# Patient Record
Sex: MALE | Race: WHITE | NOT HISPANIC OR LATINO | URBAN - METROPOLITAN AREA
[De-identification: names, ages, dates, MRNs, and addresses within clinical notes are randomized per-mention and may not be internally consistent; named-entity substitution may affect disease eponyms.]

---

## 2017-02-01 ENCOUNTER — INPATIENT (INPATIENT)
Facility: HOSPITAL | Age: 22
LOS: 2 days | Discharge: HOME | DRG: 669 | End: 2017-02-04
Attending: UROLOGY | Admitting: UROLOGY
Payer: COMMERCIAL

## 2017-02-01 VITALS
OXYGEN SATURATION: 99 % | RESPIRATION RATE: 18 BRPM | DIASTOLIC BLOOD PRESSURE: 75 MMHG | HEART RATE: 97 BPM | SYSTOLIC BLOOD PRESSURE: 135 MMHG | TEMPERATURE: 99 F

## 2017-02-01 LAB
ALBUMIN SERPL ELPH-MCNC: 4.7 G/DL — SIGNIFICANT CHANGE UP (ref 3.3–5)
ALP SERPL-CCNC: 65 U/L — SIGNIFICANT CHANGE UP (ref 40–120)
ALT FLD-CCNC: 27 U/L RC — SIGNIFICANT CHANGE UP (ref 10–45)
ANION GAP SERPL CALC-SCNC: 13 MMOL/L — SIGNIFICANT CHANGE UP (ref 5–17)
APTT BLD: 33 SEC — SIGNIFICANT CHANGE UP (ref 27.5–37.4)
AST SERPL-CCNC: 18 U/L — SIGNIFICANT CHANGE UP (ref 10–40)
BILIRUB SERPL-MCNC: 0.4 MG/DL — SIGNIFICANT CHANGE UP (ref 0.2–1.2)
BUN SERPL-MCNC: 16 MG/DL — SIGNIFICANT CHANGE UP (ref 7–23)
CALCIUM SERPL-MCNC: 10.1 MG/DL — SIGNIFICANT CHANGE UP (ref 8.4–10.5)
CHLORIDE SERPL-SCNC: 105 MMOL/L — SIGNIFICANT CHANGE UP (ref 96–108)
CO2 SERPL-SCNC: 24 MMOL/L — SIGNIFICANT CHANGE UP (ref 22–31)
CREAT SERPL-MCNC: 1.62 MG/DL — HIGH (ref 0.5–1.3)
GLUCOSE SERPL-MCNC: 95 MG/DL — SIGNIFICANT CHANGE UP (ref 70–99)
HCT VFR BLD CALC: 35 % — LOW (ref 39–50)
HGB BLD-MCNC: 11.6 G/DL — LOW (ref 13–17)
INR BLD: 1.15 RATIO — SIGNIFICANT CHANGE UP (ref 0.88–1.16)
MCHC RBC-ENTMCNC: 20.7 PG — LOW (ref 27–34)
MCHC RBC-ENTMCNC: 33.2 GM/DL — SIGNIFICANT CHANGE UP (ref 32–36)
MCV RBC AUTO: 62.3 FL — LOW (ref 80–100)
PLATELET # BLD AUTO: 276 K/UL — SIGNIFICANT CHANGE UP (ref 150–400)
POTASSIUM SERPL-MCNC: 4.1 MMOL/L — SIGNIFICANT CHANGE UP (ref 3.5–5.3)
POTASSIUM SERPL-SCNC: 4.1 MMOL/L — SIGNIFICANT CHANGE UP (ref 3.5–5.3)
PROT SERPL-MCNC: 7.5 G/DL — SIGNIFICANT CHANGE UP (ref 6–8.3)
PROTHROM AB SERPL-ACNC: 12.4 SEC — SIGNIFICANT CHANGE UP (ref 10–13.1)
RBC # BLD: 5.62 M/UL — SIGNIFICANT CHANGE UP (ref 4.2–5.8)
RBC # FLD: 14.7 % — HIGH (ref 10.3–14.5)
SODIUM SERPL-SCNC: 142 MMOL/L — SIGNIFICANT CHANGE UP (ref 135–145)
WBC # BLD: 12.3 K/UL — HIGH (ref 3.8–10.5)
WBC # FLD AUTO: 12.3 K/UL — HIGH (ref 3.8–10.5)

## 2017-02-01 PROCEDURE — 99285 EMERGENCY DEPT VISIT HI MDM: CPT

## 2017-02-01 RX ORDER — HYDROMORPHONE HYDROCHLORIDE 2 MG/ML
1 INJECTION INTRAMUSCULAR; INTRAVENOUS; SUBCUTANEOUS ONCE
Qty: 0 | Refills: 0 | Status: DISCONTINUED | OUTPATIENT
Start: 2017-02-01 | End: 2017-02-01

## 2017-02-01 RX ORDER — SODIUM CHLORIDE 9 MG/ML
1000 INJECTION INTRAMUSCULAR; INTRAVENOUS; SUBCUTANEOUS ONCE
Qty: 0 | Refills: 0 | Status: COMPLETED | OUTPATIENT
Start: 2017-02-01 | End: 2017-02-01

## 2017-02-01 RX ORDER — SODIUM CHLORIDE 9 MG/ML
1000 INJECTION INTRAMUSCULAR; INTRAVENOUS; SUBCUTANEOUS ONCE
Qty: 0 | Refills: 0 | Status: DISCONTINUED | OUTPATIENT
Start: 2017-02-01 | End: 2017-02-01

## 2017-02-01 RX ORDER — ONDANSETRON 8 MG/1
4 TABLET, FILM COATED ORAL ONCE
Qty: 0 | Refills: 0 | Status: COMPLETED | OUTPATIENT
Start: 2017-02-01 | End: 2017-02-01

## 2017-02-01 RX ADMIN — HYDROMORPHONE HYDROCHLORIDE 1 MILLIGRAM(S): 2 INJECTION INTRAMUSCULAR; INTRAVENOUS; SUBCUTANEOUS at 22:57

## 2017-02-01 RX ADMIN — ONDANSETRON 4 MILLIGRAM(S): 8 TABLET, FILM COATED ORAL at 23:36

## 2017-02-01 RX ADMIN — SODIUM CHLORIDE 1000 MILLILITER(S): 9 INJECTION INTRAMUSCULAR; INTRAVENOUS; SUBCUTANEOUS at 22:57

## 2017-02-01 RX ADMIN — HYDROMORPHONE HYDROCHLORIDE 1 MILLIGRAM(S): 2 INJECTION INTRAMUSCULAR; INTRAVENOUS; SUBCUTANEOUS at 23:45

## 2017-02-01 RX ADMIN — HYDROMORPHONE HYDROCHLORIDE 1 MILLIGRAM(S): 2 INJECTION INTRAMUSCULAR; INTRAVENOUS; SUBCUTANEOUS at 23:46

## 2017-02-01 NOTE — ED PROVIDER NOTE - MEDICAL DECISION MAKING DETAILS
MD Rashad,Attending: pt seen. agree with above HPI/ROS/pE. Recent stent L ureter for severe L hydroureteronephrosis ? due to congenital UPJ obstruction per pt. Moderate R sided findings on last CT. Onset of severe R sided flank pain this am with nausea/chills/radiation to R testicle. Has had intermittent gross hematuria since procedure above. Fo ranalgesia/UA/US to evaluate R sided hydro. and flat palte KUB to try and locate stone --as pt does not want CT scan due to having had 2 over past month. If pain uncontrolled will need Urology consult

## 2017-02-01 NOTE — ED PROVIDER NOTE - PROGRESS NOTE DETAILS
pain uncontrolled --will rx more dilaudid. plain film--no radiodense stone seen on R--stent on L well positioned. Creatinine eluated to 1.60 form 1.29 in Nov. Will request Urology consult

## 2017-02-01 NOTE — ED PROVIDER NOTE - OBJECTIVE STATEMENT
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 Today with severe R sided flank pain. No ureteral stents on right side. No fevers. +nausea and one episode of vomiting. No pain medication prior to arrival.

## 2017-02-01 NOTE — ED ADULT NURSE NOTE - OBJECTIVE STATEMENT
20 y/o male presenting to the 20 y/o male presenting to the ED with right flank pain; patient has hx of pyeloplasty january 9th with stent placement in left kidney; states has hx UPJ obstruction; Patient states right flank pain x 2days; Patient states nausea and vomiting x 1 episode but no diarrhea; patient states difficulty voiding with no burning; patient denies sob, chest pain, dizziness; Gross neuro in tact; a&ox3; safety and comfort measures provided 22 y/o male presenting to the ED with right flank pain; patient has hx of pyeloplasty january 9th with stent placement in left kidney; states has hx UPJ obstruction; Patient states right flank pain x 2days; Patient states nausea and vomiting x 1 episode but no diarrhea; patient states difficulty voiding with no burning; patient denies sob, chest pain, dizziness; Neuro grossly intact; a&ox3; safety and comfort measures provided

## 2017-02-01 NOTE — ED ADULT NURSE REASSESSMENT NOTE - NS ED NURSE REASSESS COMMENT FT1
Patient vomited in ER x 1 time; states pain has improved a slight bit but still has pain; states vomiting due to pain; MD notified

## 2017-02-02 DIAGNOSIS — N13.0 HYDRONEPHROSIS WITH URETEROPELVIC JUNCTION OBSTRUCTION: Chronic | ICD-10-CM

## 2017-02-02 DIAGNOSIS — N23 UNSPECIFIED RENAL COLIC: ICD-10-CM

## 2017-02-02 LAB
ANION GAP SERPL CALC-SCNC: 11 MMOL/L — SIGNIFICANT CHANGE UP (ref 5–17)
APPEARANCE UR: CLEAR — SIGNIFICANT CHANGE UP
BASOPHILS # BLD AUTO: 0 K/UL — SIGNIFICANT CHANGE UP (ref 0–0.2)
BILIRUB UR-MCNC: NEGATIVE — SIGNIFICANT CHANGE UP
BUN SERPL-MCNC: 13 MG/DL — SIGNIFICANT CHANGE UP (ref 7–23)
CALCIUM SERPL-MCNC: 9.4 MG/DL — SIGNIFICANT CHANGE UP (ref 8.4–10.5)
CHLORIDE SERPL-SCNC: 104 MMOL/L — SIGNIFICANT CHANGE UP (ref 96–108)
CO2 SERPL-SCNC: 25 MMOL/L — SIGNIFICANT CHANGE UP (ref 22–31)
COLOR SPEC: YELLOW — SIGNIFICANT CHANGE UP
COMMENT - URINE: SIGNIFICANT CHANGE UP
CREAT SERPL-MCNC: 1.54 MG/DL — HIGH (ref 0.5–1.3)
DIFF PNL FLD: ABNORMAL
EOSINOPHIL # BLD AUTO: 0.2 K/UL — SIGNIFICANT CHANGE UP (ref 0–0.5)
GLUCOSE SERPL-MCNC: 98 MG/DL — SIGNIFICANT CHANGE UP (ref 70–99)
GLUCOSE UR QL: NEGATIVE — SIGNIFICANT CHANGE UP
HCT VFR BLD CALC: 32.6 % — LOW (ref 39–50)
HGB BLD-MCNC: 10.5 G/DL — LOW (ref 13–17)
KETONES UR-MCNC: NEGATIVE — SIGNIFICANT CHANGE UP
LEUKOCYTE ESTERASE UR-ACNC: ABNORMAL
LYMPHOCYTES # BLD AUTO: 17 % — SIGNIFICANT CHANGE UP (ref 13–44)
LYMPHOCYTES # BLD AUTO: 2.1 K/UL — SIGNIFICANT CHANGE UP (ref 1–3.3)
MCHC RBC-ENTMCNC: 20.1 PG — LOW (ref 27–34)
MCHC RBC-ENTMCNC: 32.3 GM/DL — SIGNIFICANT CHANGE UP (ref 32–36)
MCV RBC AUTO: 62.2 FL — LOW (ref 80–100)
MONOCYTES # BLD AUTO: 1.1 K/UL — HIGH (ref 0–0.9)
MONOCYTES NFR BLD AUTO: 7 % — SIGNIFICANT CHANGE UP (ref 2–14)
NEUTROPHILS # BLD AUTO: 8.9 K/UL — HIGH (ref 1.8–7.4)
NEUTROPHILS NFR BLD AUTO: 75 % — SIGNIFICANT CHANGE UP (ref 43–77)
NITRITE UR-MCNC: NEGATIVE — SIGNIFICANT CHANGE UP
PH UR: 6 — SIGNIFICANT CHANGE UP (ref 4.8–8)
PLATELET # BLD AUTO: 259 K/UL — SIGNIFICANT CHANGE UP (ref 150–400)
POTASSIUM SERPL-MCNC: 4.2 MMOL/L — SIGNIFICANT CHANGE UP (ref 3.5–5.3)
POTASSIUM SERPL-SCNC: 4.2 MMOL/L — SIGNIFICANT CHANGE UP (ref 3.5–5.3)
PROT UR-MCNC: 30 MG/DL
RBC # BLD: 5.25 M/UL — SIGNIFICANT CHANGE UP (ref 4.2–5.8)
RBC # FLD: 14.3 % — SIGNIFICANT CHANGE UP (ref 10.3–14.5)
RBC CASTS # UR COMP ASSIST: >50 /HPF (ref 0–2)
SODIUM SERPL-SCNC: 140 MMOL/L — SIGNIFICANT CHANGE UP (ref 135–145)
SP GR SPEC: 1.02 — SIGNIFICANT CHANGE UP (ref 1.01–1.02)
UROBILINOGEN FLD QL: NEGATIVE — SIGNIFICANT CHANGE UP
WBC # BLD: 11.2 K/UL — HIGH (ref 3.8–10.5)
WBC # FLD AUTO: 11.2 K/UL — HIGH (ref 3.8–10.5)
WBC UR QL: SIGNIFICANT CHANGE UP /HPF (ref 0–5)

## 2017-02-02 PROCEDURE — 99222 1ST HOSP IP/OBS MODERATE 55: CPT

## 2017-02-02 PROCEDURE — 76770 US EXAM ABDO BACK WALL COMP: CPT | Mod: 26

## 2017-02-02 PROCEDURE — 76775 US EXAM ABDO BACK WALL LIM: CPT | Mod: 26

## 2017-02-02 PROCEDURE — 74181 MRI ABDOMEN W/O CONTRAST: CPT | Mod: 26

## 2017-02-02 RX ORDER — HYDROMORPHONE HYDROCHLORIDE 2 MG/ML
1 INJECTION INTRAMUSCULAR; INTRAVENOUS; SUBCUTANEOUS EVERY 4 HOURS
Qty: 0 | Refills: 0 | Status: DISCONTINUED | OUTPATIENT
Start: 2017-02-02 | End: 2017-02-02

## 2017-02-02 RX ORDER — SODIUM CHLORIDE 9 MG/ML
1000 INJECTION INTRAMUSCULAR; INTRAVENOUS; SUBCUTANEOUS
Qty: 0 | Refills: 0 | Status: DISCONTINUED | OUTPATIENT
Start: 2017-02-02 | End: 2017-02-02

## 2017-02-02 RX ORDER — ACETAMINOPHEN 500 MG
1000 TABLET ORAL ONCE
Qty: 0 | Refills: 0 | Status: COMPLETED | OUTPATIENT
Start: 2017-02-02 | End: 2017-02-02

## 2017-02-02 RX ORDER — TAMSULOSIN HYDROCHLORIDE 0.4 MG/1
0.4 CAPSULE ORAL DAILY
Qty: 0 | Refills: 0 | Status: DISCONTINUED | OUTPATIENT
Start: 2017-02-02 | End: 2017-02-03

## 2017-02-02 RX ORDER — HYDROMORPHONE HYDROCHLORIDE 2 MG/ML
1 INJECTION INTRAMUSCULAR; INTRAVENOUS; SUBCUTANEOUS ONCE
Qty: 0 | Refills: 0 | Status: DISCONTINUED | OUTPATIENT
Start: 2017-02-02 | End: 2017-02-02

## 2017-02-02 RX ORDER — OXYCODONE HYDROCHLORIDE 5 MG/1
5 TABLET ORAL
Qty: 0 | Refills: 0 | Status: DISCONTINUED | OUTPATIENT
Start: 2017-02-02 | End: 2017-02-03

## 2017-02-02 RX ORDER — INFLUENZA VIRUS VACCINE 15; 15; 15; 15 UG/.5ML; UG/.5ML; UG/.5ML; UG/.5ML
0.5 SUSPENSION INTRAMUSCULAR ONCE
Qty: 0 | Refills: 0 | Status: DISCONTINUED | OUTPATIENT
Start: 2017-02-02 | End: 2017-02-04

## 2017-02-02 RX ORDER — HYDROMORPHONE HYDROCHLORIDE 2 MG/ML
2 INJECTION INTRAMUSCULAR; INTRAVENOUS; SUBCUTANEOUS EVERY 4 HOURS
Qty: 0 | Refills: 0 | Status: DISCONTINUED | OUTPATIENT
Start: 2017-02-02 | End: 2017-02-03

## 2017-02-02 RX ORDER — SODIUM CHLORIDE 9 MG/ML
1000 INJECTION, SOLUTION INTRAVENOUS
Qty: 0 | Refills: 0 | Status: DISCONTINUED | OUTPATIENT
Start: 2017-02-02 | End: 2017-02-02

## 2017-02-02 RX ORDER — OXYCODONE HYDROCHLORIDE 5 MG/1
10 TABLET ORAL
Qty: 0 | Refills: 0 | Status: DISCONTINUED | OUTPATIENT
Start: 2017-02-02 | End: 2017-02-03

## 2017-02-02 RX ORDER — OXYCODONE HYDROCHLORIDE 5 MG/1
5 TABLET ORAL ONCE
Qty: 0 | Refills: 0 | Status: DISCONTINUED | OUTPATIENT
Start: 2017-02-02 | End: 2017-02-02

## 2017-02-02 RX ORDER — ZOLPIDEM TARTRATE 10 MG/1
5 TABLET ORAL AT BEDTIME
Qty: 0 | Refills: 0 | Status: DISCONTINUED | OUTPATIENT
Start: 2017-02-02 | End: 2017-02-03

## 2017-02-02 RX ORDER — ONDANSETRON 8 MG/1
4 TABLET, FILM COATED ORAL EVERY 6 HOURS
Qty: 0 | Refills: 0 | Status: DISCONTINUED | OUTPATIENT
Start: 2017-02-02 | End: 2017-02-03

## 2017-02-02 RX ORDER — DOCUSATE SODIUM 100 MG
100 CAPSULE ORAL THREE TIMES A DAY
Qty: 0 | Refills: 0 | Status: DISCONTINUED | OUTPATIENT
Start: 2017-02-02 | End: 2017-02-03

## 2017-02-02 RX ORDER — SENNA PLUS 8.6 MG/1
2 TABLET ORAL AT BEDTIME
Qty: 0 | Refills: 0 | Status: DISCONTINUED | OUTPATIENT
Start: 2017-02-02 | End: 2017-02-03

## 2017-02-02 RX ADMIN — HYDROMORPHONE HYDROCHLORIDE 1 MILLIGRAM(S): 2 INJECTION INTRAMUSCULAR; INTRAVENOUS; SUBCUTANEOUS at 13:17

## 2017-02-02 RX ADMIN — Medication 100 MILLIGRAM(S): at 06:26

## 2017-02-02 RX ADMIN — HYDROMORPHONE HYDROCHLORIDE 1 MILLIGRAM(S): 2 INJECTION INTRAMUSCULAR; INTRAVENOUS; SUBCUTANEOUS at 13:32

## 2017-02-02 RX ADMIN — OXYCODONE HYDROCHLORIDE 5 MILLIGRAM(S): 5 TABLET ORAL at 06:56

## 2017-02-02 RX ADMIN — Medication 400 MILLIGRAM(S): at 15:44

## 2017-02-02 RX ADMIN — HYDROMORPHONE HYDROCHLORIDE 1 MILLIGRAM(S): 2 INJECTION INTRAMUSCULAR; INTRAVENOUS; SUBCUTANEOUS at 16:17

## 2017-02-02 RX ADMIN — Medication 100 MILLIGRAM(S): at 13:20

## 2017-02-02 RX ADMIN — Medication 1000 MILLIGRAM(S): at 16:00

## 2017-02-02 RX ADMIN — HYDROMORPHONE HYDROCHLORIDE 1 MILLIGRAM(S): 2 INJECTION INTRAMUSCULAR; INTRAVENOUS; SUBCUTANEOUS at 02:04

## 2017-02-02 RX ADMIN — TAMSULOSIN HYDROCHLORIDE 0.4 MILLIGRAM(S): 0.4 CAPSULE ORAL at 13:20

## 2017-02-02 RX ADMIN — OXYCODONE HYDROCHLORIDE 10 MILLIGRAM(S): 5 TABLET ORAL at 09:55

## 2017-02-02 RX ADMIN — OXYCODONE HYDROCHLORIDE 5 MILLIGRAM(S): 5 TABLET ORAL at 19:34

## 2017-02-02 RX ADMIN — OXYCODONE HYDROCHLORIDE 10 MILLIGRAM(S): 5 TABLET ORAL at 22:50

## 2017-02-02 RX ADMIN — OXYCODONE HYDROCHLORIDE 5 MILLIGRAM(S): 5 TABLET ORAL at 20:19

## 2017-02-02 RX ADMIN — OXYCODONE HYDROCHLORIDE 10 MILLIGRAM(S): 5 TABLET ORAL at 22:17

## 2017-02-02 RX ADMIN — HYDROMORPHONE HYDROCHLORIDE 2 MILLIGRAM(S): 2 INJECTION INTRAMUSCULAR; INTRAVENOUS; SUBCUTANEOUS at 19:31

## 2017-02-02 RX ADMIN — HYDROMORPHONE HYDROCHLORIDE 2 MILLIGRAM(S): 2 INJECTION INTRAMUSCULAR; INTRAVENOUS; SUBCUTANEOUS at 18:42

## 2017-02-02 RX ADMIN — Medication 400 MILLIGRAM(S): at 04:33

## 2017-02-02 RX ADMIN — Medication 1000 MILLIGRAM(S): at 05:53

## 2017-02-02 RX ADMIN — HYDROMORPHONE HYDROCHLORIDE 1 MILLIGRAM(S): 2 INJECTION INTRAMUSCULAR; INTRAVENOUS; SUBCUTANEOUS at 01:52

## 2017-02-02 RX ADMIN — ONDANSETRON 4 MILLIGRAM(S): 8 TABLET, FILM COATED ORAL at 15:35

## 2017-02-02 RX ADMIN — SODIUM CHLORIDE 125 MILLILITER(S): 9 INJECTION, SOLUTION INTRAVENOUS at 11:35

## 2017-02-02 RX ADMIN — HYDROMORPHONE HYDROCHLORIDE 1 MILLIGRAM(S): 2 INJECTION INTRAMUSCULAR; INTRAVENOUS; SUBCUTANEOUS at 16:35

## 2017-02-02 RX ADMIN — OXYCODONE HYDROCHLORIDE 5 MILLIGRAM(S): 5 TABLET ORAL at 05:53

## 2017-02-02 RX ADMIN — SODIUM CHLORIDE 125 MILLILITER(S): 9 INJECTION, SOLUTION INTRAVENOUS at 06:16

## 2017-02-02 RX ADMIN — OXYCODONE HYDROCHLORIDE 5 MILLIGRAM(S): 5 TABLET ORAL at 06:18

## 2017-02-02 RX ADMIN — OXYCODONE HYDROCHLORIDE 5 MILLIGRAM(S): 5 TABLET ORAL at 04:33

## 2017-02-02 RX ADMIN — OXYCODONE HYDROCHLORIDE 10 MILLIGRAM(S): 5 TABLET ORAL at 10:50

## 2017-02-02 RX ADMIN — HYDROMORPHONE HYDROCHLORIDE 1 MILLIGRAM(S): 2 INJECTION INTRAMUSCULAR; INTRAVENOUS; SUBCUTANEOUS at 03:33

## 2017-02-02 RX ADMIN — Medication 100 MILLIGRAM(S): at 21:18

## 2017-02-02 NOTE — H&P ADULT. - ATTENDING COMMENTS
21M with b/l UPJ obstruction with recent left pyeloplasty and stent in place. Severe R hydro and R flank pain poorly controlled with pain med. Known R UPJ obstruction awaiting repair but also with h/o b/l kidney stones. Sonogram suggests possible right ureteral stone. recommending MR urogram for further evaluation. Patient may require stent insertion or R PCN to alleviate pain.

## 2017-02-02 NOTE — ED ADULT NURSE REASSESSMENT NOTE - NS ED NURSE REASSESS COMMENT FT1
Patient states partial relief from latest medication intervention; Patient appears to be in no distress right now; vitals stable; a&ox3; patient awaiting transport to [facility]

## 2017-02-02 NOTE — H&P ADULT. - RS GEN PE MLT RESP DETAILS PC
good air movement/breath sounds equal/airway patent/clear to auscultation bilaterally/respirations non-labored/normal

## 2017-02-02 NOTE — H&P ADULT. - HISTORY OF PRESENT ILLNESS
22 yo m with a pmh of left upj obstruction s/p pyeloplasty 1/9/17 with stent inserted, nephrolithiasis who arrived with severe right flank pain " feels like a stone is passing". pain is unrelenting and not amenable to po medications. + nausea and vomiting. denies fevers and chills/ hematuria/ dysuria and change in urinary sx.   in the er pt was unwilling to have further ct imaging as has had two ct for upj obst recently.   urologist: .............. 20 yo m with a pmh of bilateral UPJ obstruction, recently s/p left pyeloplasty 1/9/17 but his pediatric urologist with stent inserted, and h/o b/l nephrolithiasis who arrived with severe right flank pain " feels like a stone is passing". pain is unrelenting and not amenable to po medications. + nausea and vomiting. denies fevers and chills/ hematuria/ dysuria and change in urinary sx. Patient is also awaiting R pyeloplasty  in the er pt was unwilling to have further ct imaging as has had two ct for upj obst recently.   urologist: ..............

## 2017-02-02 NOTE — ED ADULT NURSE REASSESSMENT NOTE - NS ED NURSE REASSESS COMMENT FT1
Patient still complaining of pain; MD aware; patient placed on capnographer; vitals stable; patient currently resting in stretcher; a&ox3; safety and comfort measures provided; urology has seen patient; awaiting disposition

## 2017-02-03 LAB
ANION GAP SERPL CALC-SCNC: 12 MMOL/L — SIGNIFICANT CHANGE UP (ref 5–17)
BUN SERPL-MCNC: 11 MG/DL — SIGNIFICANT CHANGE UP (ref 7–23)
CALCIUM SERPL-MCNC: 9.7 MG/DL — SIGNIFICANT CHANGE UP (ref 8.4–10.5)
CHLORIDE SERPL-SCNC: 102 MMOL/L — SIGNIFICANT CHANGE UP (ref 96–108)
CO2 SERPL-SCNC: 25 MMOL/L — SIGNIFICANT CHANGE UP (ref 22–31)
CREAT SERPL-MCNC: 1.57 MG/DL — HIGH (ref 0.5–1.3)
CULTURE RESULTS: NO GROWTH — SIGNIFICANT CHANGE UP
GLUCOSE SERPL-MCNC: 94 MG/DL — SIGNIFICANT CHANGE UP (ref 70–99)
POTASSIUM SERPL-MCNC: 4.2 MMOL/L — SIGNIFICANT CHANGE UP (ref 3.5–5.3)
POTASSIUM SERPL-SCNC: 4.2 MMOL/L — SIGNIFICANT CHANGE UP (ref 3.5–5.3)
SODIUM SERPL-SCNC: 139 MMOL/L — SIGNIFICANT CHANGE UP (ref 135–145)
SPECIMEN SOURCE: SIGNIFICANT CHANGE UP

## 2017-02-03 PROCEDURE — 52332 CYSTOSCOPY AND TREATMENT: CPT | Mod: RT

## 2017-02-03 PROCEDURE — 52352 CYSTOURETERO W/STONE REMOVE: CPT | Mod: RT

## 2017-02-03 RX ORDER — HYDROMORPHONE HYDROCHLORIDE 2 MG/ML
0.5 INJECTION INTRAMUSCULAR; INTRAVENOUS; SUBCUTANEOUS
Qty: 0 | Refills: 0 | Status: DISCONTINUED | OUTPATIENT
Start: 2017-02-03 | End: 2017-02-03

## 2017-02-03 RX ORDER — OXYCODONE HYDROCHLORIDE 5 MG/1
5 TABLET ORAL
Qty: 0 | Refills: 0 | Status: DISCONTINUED | OUTPATIENT
Start: 2017-02-03 | End: 2017-02-04

## 2017-02-03 RX ORDER — ZOLPIDEM TARTRATE 10 MG/1
5 TABLET ORAL AT BEDTIME
Qty: 0 | Refills: 0 | Status: DISCONTINUED | OUTPATIENT
Start: 2017-02-03 | End: 2017-02-04

## 2017-02-03 RX ORDER — ACETAMINOPHEN 500 MG
1000 TABLET ORAL ONCE
Qty: 0 | Refills: 0 | Status: COMPLETED | OUTPATIENT
Start: 2017-02-03 | End: 2017-02-03

## 2017-02-03 RX ORDER — ONDANSETRON 8 MG/1
4 TABLET, FILM COATED ORAL EVERY 6 HOURS
Qty: 0 | Refills: 0 | Status: DISCONTINUED | OUTPATIENT
Start: 2017-02-03 | End: 2017-02-04

## 2017-02-03 RX ORDER — OXYCODONE HYDROCHLORIDE 5 MG/1
10 TABLET ORAL
Qty: 0 | Refills: 0 | Status: DISCONTINUED | OUTPATIENT
Start: 2017-02-03 | End: 2017-02-04

## 2017-02-03 RX ORDER — HYDROMORPHONE HYDROCHLORIDE 2 MG/ML
1 INJECTION INTRAMUSCULAR; INTRAVENOUS; SUBCUTANEOUS
Qty: 0 | Refills: 0 | Status: DISCONTINUED | OUTPATIENT
Start: 2017-02-03 | End: 2017-02-03

## 2017-02-03 RX ORDER — SODIUM CHLORIDE 9 MG/ML
1000 INJECTION INTRAMUSCULAR; INTRAVENOUS; SUBCUTANEOUS
Qty: 0 | Refills: 0 | Status: DISCONTINUED | OUTPATIENT
Start: 2017-02-03 | End: 2017-02-04

## 2017-02-03 RX ORDER — HYDROMORPHONE HYDROCHLORIDE 2 MG/ML
1 INJECTION INTRAMUSCULAR; INTRAVENOUS; SUBCUTANEOUS
Qty: 0 | Refills: 0 | Status: DISCONTINUED | OUTPATIENT
Start: 2017-02-03 | End: 2017-02-04

## 2017-02-03 RX ORDER — SODIUM CHLORIDE 9 MG/ML
999 INJECTION, SOLUTION INTRAVENOUS ONCE
Qty: 0 | Refills: 0 | Status: COMPLETED | OUTPATIENT
Start: 2017-02-03 | End: 2017-02-03

## 2017-02-03 RX ORDER — ONDANSETRON 8 MG/1
4 TABLET, FILM COATED ORAL ONCE
Qty: 0 | Refills: 0 | Status: COMPLETED | OUTPATIENT
Start: 2017-02-03 | End: 2017-02-03

## 2017-02-03 RX ORDER — SODIUM CHLORIDE 9 MG/ML
1000 INJECTION INTRAMUSCULAR; INTRAVENOUS; SUBCUTANEOUS
Qty: 0 | Refills: 0 | Status: DISCONTINUED | OUTPATIENT
Start: 2017-02-03 | End: 2017-02-03

## 2017-02-03 RX ORDER — TAMSULOSIN HYDROCHLORIDE 0.4 MG/1
0.4 CAPSULE ORAL DAILY
Qty: 0 | Refills: 0 | Status: DISCONTINUED | OUTPATIENT
Start: 2017-02-03 | End: 2017-02-04

## 2017-02-03 RX ORDER — HYDROMORPHONE HYDROCHLORIDE 2 MG/ML
1 INJECTION INTRAMUSCULAR; INTRAVENOUS; SUBCUTANEOUS ONCE
Qty: 0 | Refills: 0 | Status: DISCONTINUED | OUTPATIENT
Start: 2017-02-03 | End: 2017-02-03

## 2017-02-03 RX ORDER — DOCUSATE SODIUM 100 MG
100 CAPSULE ORAL THREE TIMES A DAY
Qty: 0 | Refills: 0 | Status: DISCONTINUED | OUTPATIENT
Start: 2017-02-03 | End: 2017-02-04

## 2017-02-03 RX ORDER — SENNA PLUS 8.6 MG/1
2 TABLET ORAL AT BEDTIME
Qty: 0 | Refills: 0 | Status: DISCONTINUED | OUTPATIENT
Start: 2017-02-03 | End: 2017-02-04

## 2017-02-03 RX ADMIN — HYDROMORPHONE HYDROCHLORIDE 1 MILLIGRAM(S): 2 INJECTION INTRAMUSCULAR; INTRAVENOUS; SUBCUTANEOUS at 17:08

## 2017-02-03 RX ADMIN — Medication 1000 MILLIGRAM(S): at 14:17

## 2017-02-03 RX ADMIN — OXYCODONE HYDROCHLORIDE 10 MILLIGRAM(S): 5 TABLET ORAL at 12:37

## 2017-02-03 RX ADMIN — OXYCODONE HYDROCHLORIDE 10 MILLIGRAM(S): 5 TABLET ORAL at 06:07

## 2017-02-03 RX ADMIN — TAMSULOSIN HYDROCHLORIDE 0.4 MILLIGRAM(S): 0.4 CAPSULE ORAL at 22:35

## 2017-02-03 RX ADMIN — HYDROMORPHONE HYDROCHLORIDE 2 MILLIGRAM(S): 2 INJECTION INTRAMUSCULAR; INTRAVENOUS; SUBCUTANEOUS at 09:08

## 2017-02-03 RX ADMIN — TAMSULOSIN HYDROCHLORIDE 0.4 MILLIGRAM(S): 0.4 CAPSULE ORAL at 13:08

## 2017-02-03 RX ADMIN — SODIUM CHLORIDE 1816.36 MILLILITER(S): 9 INJECTION, SOLUTION INTRAVENOUS at 22:15

## 2017-02-03 RX ADMIN — Medication 400 MILLIGRAM(S): at 14:02

## 2017-02-03 RX ADMIN — ONDANSETRON 4 MILLIGRAM(S): 8 TABLET, FILM COATED ORAL at 20:50

## 2017-02-03 RX ADMIN — HYDROMORPHONE HYDROCHLORIDE 2 MILLIGRAM(S): 2 INJECTION INTRAMUSCULAR; INTRAVENOUS; SUBCUTANEOUS at 08:38

## 2017-02-03 RX ADMIN — ONDANSETRON 4 MILLIGRAM(S): 8 TABLET, FILM COATED ORAL at 15:54

## 2017-02-03 RX ADMIN — Medication 400 MILLIGRAM(S): at 06:19

## 2017-02-03 RX ADMIN — SODIUM CHLORIDE 100 MILLILITER(S): 9 INJECTION INTRAMUSCULAR; INTRAVENOUS; SUBCUTANEOUS at 20:53

## 2017-02-03 RX ADMIN — HYDROMORPHONE HYDROCHLORIDE 1 MILLIGRAM(S): 2 INJECTION INTRAMUSCULAR; INTRAVENOUS; SUBCUTANEOUS at 17:23

## 2017-02-03 RX ADMIN — HYDROMORPHONE HYDROCHLORIDE 0.5 MILLIGRAM(S): 2 INJECTION INTRAMUSCULAR; INTRAVENOUS; SUBCUTANEOUS at 21:00

## 2017-02-03 RX ADMIN — Medication 1000 MILLIGRAM(S): at 06:49

## 2017-02-03 RX ADMIN — HYDROMORPHONE HYDROCHLORIDE 1 MILLIGRAM(S): 2 INJECTION INTRAMUSCULAR; INTRAVENOUS; SUBCUTANEOUS at 23:00

## 2017-02-03 RX ADMIN — HYDROMORPHONE HYDROCHLORIDE 1 MILLIGRAM(S): 2 INJECTION INTRAMUSCULAR; INTRAVENOUS; SUBCUTANEOUS at 22:34

## 2017-02-03 RX ADMIN — Medication 100 MILLIGRAM(S): at 05:55

## 2017-02-03 RX ADMIN — Medication 100 MILLIGRAM(S): at 13:09

## 2017-02-03 RX ADMIN — OXYCODONE HYDROCHLORIDE 10 MILLIGRAM(S): 5 TABLET ORAL at 13:07

## 2017-02-03 RX ADMIN — HYDROMORPHONE HYDROCHLORIDE 0.5 MILLIGRAM(S): 2 INJECTION INTRAMUSCULAR; INTRAVENOUS; SUBCUTANEOUS at 21:15

## 2017-02-03 RX ADMIN — ONDANSETRON 4 MILLIGRAM(S): 8 TABLET, FILM COATED ORAL at 20:45

## 2017-02-03 RX ADMIN — SODIUM CHLORIDE 100 MILLILITER(S): 9 INJECTION INTRAMUSCULAR; INTRAVENOUS; SUBCUTANEOUS at 16:30

## 2017-02-03 RX ADMIN — HYDROMORPHONE HYDROCHLORIDE 1 MILLIGRAM(S): 2 INJECTION INTRAMUSCULAR; INTRAVENOUS; SUBCUTANEOUS at 13:32

## 2017-02-03 RX ADMIN — HYDROMORPHONE HYDROCHLORIDE 1 MILLIGRAM(S): 2 INJECTION INTRAMUSCULAR; INTRAVENOUS; SUBCUTANEOUS at 13:47

## 2017-02-03 NOTE — BRIEF OPERATIVE NOTE - OPERATION/FINDINGS
cystoscopy, R UVJ stone, basket stone extraction, ureteroscopy, R RPG, R 6x26 JJ ureteral stent insertion

## 2017-02-03 NOTE — BRIEF OPERATIVE NOTE - POST-OP DX
Hydronephrosis of right kidney  02/03/2017    Jonh Lo  Ureteral stone  02/03/2017    Active  Jonh Velez

## 2017-02-04 VITALS
TEMPERATURE: 99 F | RESPIRATION RATE: 18 BRPM | SYSTOLIC BLOOD PRESSURE: 109 MMHG | HEART RATE: 75 BPM | DIASTOLIC BLOOD PRESSURE: 70 MMHG | OXYGEN SATURATION: 96 %

## 2017-02-04 LAB
ANION GAP SERPL CALC-SCNC: 12 MMOL/L — SIGNIFICANT CHANGE UP (ref 5–17)
BUN SERPL-MCNC: 7 MG/DL — SIGNIFICANT CHANGE UP (ref 7–23)
CALCIUM SERPL-MCNC: 9.4 MG/DL — SIGNIFICANT CHANGE UP (ref 8.4–10.5)
CHLORIDE SERPL-SCNC: 103 MMOL/L — SIGNIFICANT CHANGE UP (ref 96–108)
CO2 SERPL-SCNC: 29 MMOL/L — SIGNIFICANT CHANGE UP (ref 22–31)
CREAT SERPL-MCNC: 1.13 MG/DL — SIGNIFICANT CHANGE UP (ref 0.5–1.3)
GLUCOSE SERPL-MCNC: 85 MG/DL — SIGNIFICANT CHANGE UP (ref 70–99)
HCT VFR BLD CALC: 32.1 % — LOW (ref 39–50)
HGB BLD-MCNC: 10.2 G/DL — LOW (ref 13–17)
MCHC RBC-ENTMCNC: 20.2 PG — LOW (ref 27–34)
MCHC RBC-ENTMCNC: 31.8 GM/DL — LOW (ref 32–36)
MCV RBC AUTO: 63.5 FL — LOW (ref 80–100)
PLATELET # BLD AUTO: 237 K/UL — SIGNIFICANT CHANGE UP (ref 150–400)
POTASSIUM SERPL-MCNC: 4.4 MMOL/L — SIGNIFICANT CHANGE UP (ref 3.5–5.3)
POTASSIUM SERPL-SCNC: 4.4 MMOL/L — SIGNIFICANT CHANGE UP (ref 3.5–5.3)
RBC # BLD: 5.05 M/UL — SIGNIFICANT CHANGE UP (ref 4.2–5.8)
RBC # FLD: 14.1 % — SIGNIFICANT CHANGE UP (ref 10.3–14.5)
SODIUM SERPL-SCNC: 144 MMOL/L — SIGNIFICANT CHANGE UP (ref 135–145)
WBC # BLD: 7.8 K/UL — SIGNIFICANT CHANGE UP (ref 3.8–10.5)
WBC # FLD AUTO: 7.8 K/UL — SIGNIFICANT CHANGE UP (ref 3.8–10.5)

## 2017-02-04 PROCEDURE — C1769: CPT

## 2017-02-04 PROCEDURE — 96375 TX/PRO/DX INJ NEW DRUG ADDON: CPT

## 2017-02-04 PROCEDURE — 76770 US EXAM ABDO BACK WALL COMP: CPT

## 2017-02-04 PROCEDURE — 76775 US EXAM ABDO BACK WALL LIM: CPT

## 2017-02-04 PROCEDURE — 99285 EMERGENCY DEPT VISIT HI MDM: CPT | Mod: 25

## 2017-02-04 PROCEDURE — 99232 SBSQ HOSP IP/OBS MODERATE 35: CPT

## 2017-02-04 PROCEDURE — 96376 TX/PRO/DX INJ SAME DRUG ADON: CPT

## 2017-02-04 PROCEDURE — C2617: CPT

## 2017-02-04 PROCEDURE — 74018 RADEX ABDOMEN 1 VIEW: CPT

## 2017-02-04 PROCEDURE — 74181 MRI ABDOMEN W/O CONTRAST: CPT

## 2017-02-04 PROCEDURE — 76000 FLUOROSCOPY <1 HR PHYS/QHP: CPT

## 2017-02-04 PROCEDURE — 96374 THER/PROPH/DIAG INJ IV PUSH: CPT

## 2017-02-04 PROCEDURE — 81001 URINALYSIS AUTO W/SCOPE: CPT

## 2017-02-04 PROCEDURE — 87086 URINE CULTURE/COLONY COUNT: CPT

## 2017-02-04 PROCEDURE — 80048 BASIC METABOLIC PNL TOTAL CA: CPT

## 2017-02-04 PROCEDURE — 85610 PROTHROMBIN TIME: CPT

## 2017-02-04 PROCEDURE — 80053 COMPREHEN METABOLIC PANEL: CPT

## 2017-02-04 PROCEDURE — C1889: CPT

## 2017-02-04 PROCEDURE — C1758: CPT

## 2017-02-04 PROCEDURE — 85027 COMPLETE CBC AUTOMATED: CPT

## 2017-02-04 PROCEDURE — 85730 THROMBOPLASTIN TIME PARTIAL: CPT

## 2017-02-04 RX ORDER — TAMSULOSIN HYDROCHLORIDE 0.4 MG/1
1 CAPSULE ORAL
Qty: 30 | Refills: 0
Start: 2017-02-04 | End: 2017-03-06

## 2017-02-04 RX ORDER — PHENAZOPYRIDINE HCL 100 MG
200 TABLET ORAL EVERY 8 HOURS
Qty: 0 | Refills: 0 | Status: DISCONTINUED | OUTPATIENT
Start: 2017-02-04 | End: 2017-02-04

## 2017-02-04 RX ORDER — OXYBUTYNIN CHLORIDE 5 MG
1 TABLET ORAL
Qty: 30 | Refills: 0
Start: 2017-02-04 | End: 2017-03-06

## 2017-02-04 RX ORDER — PHENAZOPYRIDINE HCL 100 MG
2 TABLET ORAL
Qty: 12 | Refills: 0
Start: 2017-02-04 | End: 2017-02-06

## 2017-02-04 RX ORDER — DOCUSATE SODIUM 100 MG
1 CAPSULE ORAL
Qty: 0 | Refills: 0 | DISCHARGE
Start: 2017-02-04

## 2017-02-04 RX ORDER — ACETAMINOPHEN 500 MG
1000 TABLET ORAL ONCE
Qty: 0 | Refills: 0 | Status: COMPLETED | OUTPATIENT
Start: 2017-02-04 | End: 2017-02-04

## 2017-02-04 RX ORDER — SENNA PLUS 8.6 MG/1
2 TABLET ORAL
Qty: 0 | Refills: 0 | DISCHARGE
Start: 2017-02-04

## 2017-02-04 RX ORDER — OXYBUTYNIN CHLORIDE 5 MG
5 TABLET ORAL EVERY 8 HOURS
Qty: 0 | Refills: 0 | Status: DISCONTINUED | OUTPATIENT
Start: 2017-02-04 | End: 2017-02-04

## 2017-02-04 RX ADMIN — Medication 200 MILLIGRAM(S): at 05:40

## 2017-02-04 RX ADMIN — OXYCODONE HYDROCHLORIDE 10 MILLIGRAM(S): 5 TABLET ORAL at 08:15

## 2017-02-04 RX ADMIN — Medication 100 MILLIGRAM(S): at 05:40

## 2017-02-04 RX ADMIN — HYDROMORPHONE HYDROCHLORIDE 1 MILLIGRAM(S): 2 INJECTION INTRAMUSCULAR; INTRAVENOUS; SUBCUTANEOUS at 02:50

## 2017-02-04 RX ADMIN — Medication 1000 MILLIGRAM(S): at 09:15

## 2017-02-04 RX ADMIN — OXYCODONE HYDROCHLORIDE 10 MILLIGRAM(S): 5 TABLET ORAL at 07:46

## 2017-02-04 RX ADMIN — TAMSULOSIN HYDROCHLORIDE 0.4 MILLIGRAM(S): 0.4 CAPSULE ORAL at 11:38

## 2017-02-04 RX ADMIN — HYDROMORPHONE HYDROCHLORIDE 1 MILLIGRAM(S): 2 INJECTION INTRAMUSCULAR; INTRAVENOUS; SUBCUTANEOUS at 02:22

## 2017-02-04 RX ADMIN — Medication 400 MILLIGRAM(S): at 08:45

## 2017-02-04 NOTE — DISCHARGE NOTE ADULT - MEDICATION SUMMARY - MEDICATIONS TO TAKE
I will START or STAY ON the medications listed below when I get home from the hospital:    Percocet 5/325 325 mg-5 mg oral tablet  -- 1 tab(s) by mouth every 4 hours MDD:6  -- Caution federal law prohibits the transfer of this drug to any person other  than the person for whom it was prescribed.  May cause drowsiness.  Alcohol may intensify this effect.  Use care when operating dangerous machinery.  This prescription cannot be refilled.  This product contains acetaminophen.  Do not use  with any other product containing acetaminophen to prevent possible liver damage.  Using more of this medication than prescribed may cause serious breathing problems.    -- Indication: For pain medication     Flomax 0.4 mg oral capsule  -- 1 cap(s) by mouth once a day  -- It is very important that you take or use this exactly as directed.  Do not skip doses or discontinue unless directed by your doctor.  May cause drowsiness.  Alcohol may intensify this effect.  Use care when operating dangerous machinery.  Some non-prescription drugs may aggravate your condition.  Read all labels carefully.  If a warning appears, check with your doctor before taking.  Swallow whole.  Do not crush.  Take with food or milk.    -- Indication: For to help with stent pains     docusate sodium 100 mg oral capsule  -- 1 cap(s) by mouth 3 times a day  -- Indication: For stool softener    senna oral tablet  -- 2 tab(s) by mouth once a day (at bedtime), As needed, Constipation  -- Indication: For stool softener    Pyridium 100 mg oral tablet  -- 2 tab(s) by mouth 3 times a day (after meals)  -- May discolor urine or feces.  Medication should be taken with plenty of water.  Take with food or milk.    -- Indication: For to help with buring     Ditropan XL 5 mg/24 hours oral tablet, extended release  -- 1 tab(s) by mouth once a day  -- May cause drowsiness.  Alcohol may intensify this effect.  Use care when operating dangerous machinery.  Swallow whole.  Do not crush.    -- Indication: For to help with stent pains

## 2017-02-04 NOTE — DISCHARGE NOTE ADULT - NS AS ACTIVITY OBS
Stairs allowed/Walking-Indoors allowed/Walking-Outdoors allowed/Showering allowed/No Heavy lifting/straining

## 2017-02-04 NOTE — DISCHARGE NOTE ADULT - CARE PROVIDER_API CALL
Rodolfo Monte  Phone: (   )    -  Fax: (   )    -    Adria Montes), Urology  [address] [place] [address]  Phone: [phone]  Fax: [phone]

## 2017-02-04 NOTE — DISCHARGE NOTE ADULT - HOSPITAL COURSE
20 yo m with a pmh of bilateral UPJ obstruction, recently s/p left pyeloplasty 1/9/17 by his pediatric urologist with stent inserted, and h/o b/l nephrolithiasis who arrived with severe right flank pain " feels like a stone is passing". pain is unrelenting and not amenable to po medications. + nausea and vomiting. denies fevers and chills/ hematuria/ dysuria and change in urinary sx. Patient is also awaiting R pyeloplasty.  Found to have hydronephrosis  Underwent ureteroscopy with stone extraction and stent placement.  Pt. tolerated procedure and had an uncomplicated post-operative course.  Advised to follow up with his own urologist or Dr. Adria Montes.  Stable for discharge from a urologic standpoint. 22 yo m with a pmh of bilateral UPJ obstruction, recently s/p left pyeloplasty 1/9/17 by his pediatric urologist with stent inserted, and h/o b/l nephrolithiasis who arrived with severe right flank pain " feels like a stone is passing". pain is unrelenting and not amenable to po medications. + nausea and vomiting. denies fevers and chills/ hematuria/ dysuria and change in urinary sx. Patient is also awaiting R pyeloplasty.  Found to have hydronephrosis  Pts pain was intractable so decision was made  to procede to OR. A small right uvj stone was basketed and stent placement.  Pt. tolerated procedure and had an uncomplicated post-operative course.  Advised to follow up with his own urologist or Dr. Adria Montes.  Stable for discharge from a urologic standpoint.

## 2017-02-04 NOTE — DISCHARGE NOTE ADULT - CARE PROVIDERS DIRECT ADDRESSES
,DirectAddress_Unknown,[email or web address]

## 2017-02-04 NOTE — DISCHARGE NOTE ADULT - ADDITIONAL INSTRUCTIONS
Follow up with your urologist, Dr. Rodolfo Monte during college break in February, sooner if any issues.  Or follow up with Dr. Adria Montes

## 2017-02-04 NOTE — DISCHARGE NOTE ADULT - PATIENT PORTAL LINK FT
“You can access the [employer] Patient Portal, offered by [facility], by registering with the following website: [email or web address]”

## 2017-02-04 NOTE — DISCHARGE NOTE ADULT - CONDITIONS AT DISCHARGE
pt alert and oriented. pt skin intact. pt vital signs WNL. pt IV remove and site remain WNL. pt discharge home.

## 2017-02-04 NOTE — DISCHARGE NOTE ADULT - CARE PLAN
Principal Discharge DX:	Ureteral colic  Goal:	treatment  Instructions for follow-up, activity and diet:	You may have intermittent pink tinged urine and slight flank pain when you urinate.  This is normal and due to the stent in your ureter.   If your urine becomes bright red or with clots, please call the office.  To follow up with your private urologist during college break in February, sooner if any issues.  Or follow up with Dr. Adria Montes

## 2017-02-04 NOTE — DISCHARGE NOTE ADULT - PLAN OF CARE
treatment You may have intermittent pink tinged urine and slight flank pain when you urinate.  This is normal and due to the stent in your ureter.   If your urine becomes bright red or with clots, please call the office.  To follow up with your private urologist during college break in February, sooner if any issues.  Or follow up with Dr. Adria Montes

## 2017-02-07 NOTE — ED ADULT NURSE NOTE - CAS DISCH ACCOMP BY
Alert-The patient is alert, awake and responds to voice. The patient is oriented to time, place, and person. The triage nurse is able to obtain subjective information.
Transporter

## 2017-02-08 PROBLEM — Z00.00 ENCOUNTER FOR PREVENTIVE HEALTH EXAMINATION: Status: ACTIVE | Noted: 2017-02-08

## 2017-02-28 ENCOUNTER — APPOINTMENT (OUTPATIENT)
Dept: UROLOGY | Facility: CLINIC | Age: 22
End: 2017-02-28

## 2017-02-28 VITALS
TEMPERATURE: 97.9 F | DIASTOLIC BLOOD PRESSURE: 72 MMHG | WEIGHT: 150 LBS | RESPIRATION RATE: 15 BRPM | BODY MASS INDEX: 23.54 KG/M2 | HEART RATE: 59 BPM | SYSTOLIC BLOOD PRESSURE: 113 MMHG | HEIGHT: 67 IN

## 2017-02-28 DIAGNOSIS — Z87.442 PERSONAL HISTORY OF URINARY CALCULI: ICD-10-CM

## 2017-03-01 ENCOUNTER — TRANSCRIPTION ENCOUNTER (OUTPATIENT)
Age: 22
End: 2017-03-01

## 2017-03-01 LAB
ANION GAP SERPL CALC-SCNC: 17 MMOL/L
BUN SERPL-MCNC: 14 MG/DL
CALCIUM SERPL-MCNC: 10.4 MG/DL
CHLORIDE SERPL-SCNC: 104 MMOL/L
CO2 SERPL-SCNC: 22 MMOL/L
CREAT SERPL-MCNC: 0.99 MG/DL
GLUCOSE SERPL-MCNC: 87 MG/DL
POTASSIUM SERPL-SCNC: 4.8 MMOL/L
SODIUM SERPL-SCNC: 143 MMOL/L

## 2017-03-03 LAB — BACTERIA UR CULT: NORMAL

## 2017-03-07 ENCOUNTER — APPOINTMENT (OUTPATIENT)
Dept: UROLOGY | Facility: CLINIC | Age: 22
End: 2017-03-07

## 2017-04-12 ENCOUNTER — APPOINTMENT (OUTPATIENT)
Dept: UROLOGY | Facility: HOSPITAL | Age: 22
End: 2017-04-12

## 2017-04-28 ENCOUNTER — RESULT REVIEW (OUTPATIENT)
Age: 22
End: 2017-04-28

## 2017-04-29 ENCOUNTER — EMERGENCY (EMERGENCY)
Facility: HOSPITAL | Age: 22
LOS: 1 days | Discharge: HOME | End: 2017-04-29
Attending: PERSONAL EMERGENCY RESPONSE ATTENDANT | Admitting: PERSONAL EMERGENCY RESPONSE ATTENDANT
Payer: COMMERCIAL

## 2017-04-29 VITALS
DIASTOLIC BLOOD PRESSURE: 55 MMHG | HEART RATE: 89 BPM | RESPIRATION RATE: 18 BRPM | TEMPERATURE: 98 F | OXYGEN SATURATION: 98 % | SYSTOLIC BLOOD PRESSURE: 127 MMHG

## 2017-04-29 VITALS
RESPIRATION RATE: 17 BRPM | DIASTOLIC BLOOD PRESSURE: 72 MMHG | HEART RATE: 84 BPM | SYSTOLIC BLOOD PRESSURE: 130 MMHG | TEMPERATURE: 98 F | OXYGEN SATURATION: 100 %

## 2017-04-29 DIAGNOSIS — R30.0 DYSURIA: ICD-10-CM

## 2017-04-29 DIAGNOSIS — N13.0 HYDRONEPHROSIS WITH URETEROPELVIC JUNCTION OBSTRUCTION: Chronic | ICD-10-CM

## 2017-04-29 DIAGNOSIS — R10.9 UNSPECIFIED ABDOMINAL PAIN: ICD-10-CM

## 2017-04-29 LAB
ALBUMIN SERPL ELPH-MCNC: 4.8 G/DL — SIGNIFICANT CHANGE UP (ref 3.3–5)
ALP SERPL-CCNC: 66 U/L — SIGNIFICANT CHANGE UP (ref 40–120)
ALT FLD-CCNC: 24 U/L RC — SIGNIFICANT CHANGE UP (ref 10–45)
ANION GAP SERPL CALC-SCNC: 15 MMOL/L — SIGNIFICANT CHANGE UP (ref 5–17)
APPEARANCE UR: CLEAR — SIGNIFICANT CHANGE UP
APTT BLD: 36.7 SEC — SIGNIFICANT CHANGE UP (ref 27.5–37.4)
AST SERPL-CCNC: 18 U/L — SIGNIFICANT CHANGE UP (ref 10–40)
BASOPHILS # BLD AUTO: 0.1 K/UL — SIGNIFICANT CHANGE UP (ref 0–0.2)
BASOPHILS NFR BLD AUTO: 0.5 % — SIGNIFICANT CHANGE UP (ref 0–2)
BILIRUB SERPL-MCNC: 0.4 MG/DL — SIGNIFICANT CHANGE UP (ref 0.2–1.2)
BILIRUB UR-MCNC: NEGATIVE — SIGNIFICANT CHANGE UP
BLD GP AB SCN SERPL QL: NEGATIVE — SIGNIFICANT CHANGE UP
BUN SERPL-MCNC: 19 MG/DL — SIGNIFICANT CHANGE UP (ref 7–23)
CALCIUM SERPL-MCNC: 9.8 MG/DL — SIGNIFICANT CHANGE UP (ref 8.4–10.5)
CHLORIDE SERPL-SCNC: 103 MMOL/L — SIGNIFICANT CHANGE UP (ref 96–108)
CO2 SERPL-SCNC: 25 MMOL/L — SIGNIFICANT CHANGE UP (ref 22–31)
COLOR SPEC: YELLOW — SIGNIFICANT CHANGE UP
CREAT SERPL-MCNC: 1.21 MG/DL — SIGNIFICANT CHANGE UP (ref 0.5–1.3)
DIFF PNL FLD: ABNORMAL
EOSINOPHIL # BLD AUTO: 0.4 K/UL — SIGNIFICANT CHANGE UP (ref 0–0.5)
EOSINOPHIL NFR BLD AUTO: 3.2 % — SIGNIFICANT CHANGE UP (ref 0–6)
GLUCOSE SERPL-MCNC: 101 MG/DL — HIGH (ref 70–99)
GLUCOSE UR QL: NEGATIVE — SIGNIFICANT CHANGE UP
HCT VFR BLD CALC: 39.7 % — SIGNIFICANT CHANGE UP (ref 39–50)
HGB BLD-MCNC: 12.9 G/DL — LOW (ref 13–17)
INR BLD: 1.07 RATIO — SIGNIFICANT CHANGE UP (ref 0.88–1.16)
KETONES UR-MCNC: NEGATIVE — SIGNIFICANT CHANGE UP
LEUKOCYTE ESTERASE UR-ACNC: NEGATIVE — SIGNIFICANT CHANGE UP
LYMPHOCYTES # BLD AUTO: 3.5 K/UL — HIGH (ref 1–3.3)
LYMPHOCYTES # BLD AUTO: 31.3 % — SIGNIFICANT CHANGE UP (ref 13–44)
MCHC RBC-ENTMCNC: 20.1 PG — LOW (ref 27–34)
MCHC RBC-ENTMCNC: 32.6 GM/DL — SIGNIFICANT CHANGE UP (ref 32–36)
MCV RBC AUTO: 61.8 FL — LOW (ref 80–100)
MONOCYTES # BLD AUTO: 0.9 K/UL — SIGNIFICANT CHANGE UP (ref 0–0.9)
MONOCYTES NFR BLD AUTO: 8.2 % — SIGNIFICANT CHANGE UP (ref 2–14)
NEUTROPHILS # BLD AUTO: 6.3 K/UL — SIGNIFICANT CHANGE UP (ref 1.8–7.4)
NEUTROPHILS NFR BLD AUTO: 56.8 % — SIGNIFICANT CHANGE UP (ref 43–77)
NITRITE UR-MCNC: NEGATIVE — SIGNIFICANT CHANGE UP
PH UR: 6 — SIGNIFICANT CHANGE UP (ref 5–8)
PLATELET # BLD AUTO: 280 K/UL — SIGNIFICANT CHANGE UP (ref 150–400)
POTASSIUM SERPL-MCNC: 3.9 MMOL/L — SIGNIFICANT CHANGE UP (ref 3.5–5.3)
POTASSIUM SERPL-SCNC: 3.9 MMOL/L — SIGNIFICANT CHANGE UP (ref 3.5–5.3)
PROT SERPL-MCNC: 7.7 G/DL — SIGNIFICANT CHANGE UP (ref 6–8.3)
PROT UR-MCNC: SIGNIFICANT CHANGE UP
PROTHROM AB SERPL-ACNC: 11.6 SEC — SIGNIFICANT CHANGE UP (ref 9.8–12.7)
RBC # BLD: 6.43 M/UL — HIGH (ref 4.2–5.8)
RBC # FLD: 14.4 % — SIGNIFICANT CHANGE UP (ref 10.3–14.5)
RBC CASTS # UR COMP ASSIST: ABNORMAL /HPF (ref 0–2)
RH IG SCN BLD-IMP: POSITIVE — SIGNIFICANT CHANGE UP
SODIUM SERPL-SCNC: 143 MMOL/L — SIGNIFICANT CHANGE UP (ref 135–145)
SP GR SPEC: 1.02 — SIGNIFICANT CHANGE UP (ref 1.01–1.02)
UROBILINOGEN FLD QL: NEGATIVE — SIGNIFICANT CHANGE UP
WBC # BLD: 11.1 K/UL — HIGH (ref 3.8–10.5)
WBC # FLD AUTO: 11.1 K/UL — HIGH (ref 3.8–10.5)
WBC UR QL: SIGNIFICANT CHANGE UP /HPF (ref 0–5)

## 2017-04-29 PROCEDURE — 86901 BLOOD TYPING SEROLOGIC RH(D): CPT

## 2017-04-29 PROCEDURE — 88300 SURGICAL PATH GROSS: CPT | Mod: 26

## 2017-04-29 PROCEDURE — 82365 CALCULUS SPECTROSCOPY: CPT

## 2017-04-29 PROCEDURE — 99284 EMERGENCY DEPT VISIT MOD MDM: CPT | Mod: 25

## 2017-04-29 PROCEDURE — 85610 PROTHROMBIN TIME: CPT

## 2017-04-29 PROCEDURE — 81001 URINALYSIS AUTO W/SCOPE: CPT

## 2017-04-29 PROCEDURE — 99285 EMERGENCY DEPT VISIT HI MDM: CPT | Mod: 25

## 2017-04-29 PROCEDURE — 86900 BLOOD TYPING SEROLOGIC ABO: CPT

## 2017-04-29 PROCEDURE — 96374 THER/PROPH/DIAG INJ IV PUSH: CPT

## 2017-04-29 PROCEDURE — 88300 SURGICAL PATH GROSS: CPT

## 2017-04-29 PROCEDURE — 74176 CT ABD & PELVIS W/O CONTRAST: CPT

## 2017-04-29 PROCEDURE — 96375 TX/PRO/DX INJ NEW DRUG ADDON: CPT

## 2017-04-29 PROCEDURE — 85730 THROMBOPLASTIN TIME PARTIAL: CPT

## 2017-04-29 PROCEDURE — 74176 CT ABD & PELVIS W/O CONTRAST: CPT | Mod: 26

## 2017-04-29 PROCEDURE — 87086 URINE CULTURE/COLONY COUNT: CPT

## 2017-04-29 PROCEDURE — 86850 RBC ANTIBODY SCREEN: CPT

## 2017-04-29 PROCEDURE — 80053 COMPREHEN METABOLIC PANEL: CPT

## 2017-04-29 PROCEDURE — 85027 COMPLETE CBC AUTOMATED: CPT

## 2017-04-29 RX ORDER — OXYCODONE HYDROCHLORIDE 5 MG/1
5 TABLET ORAL ONCE
Qty: 0 | Refills: 0 | Status: DISCONTINUED | OUTPATIENT
Start: 2017-04-29 | End: 2017-04-29

## 2017-04-29 RX ORDER — METOCLOPRAMIDE HCL 10 MG
10 TABLET ORAL ONCE
Qty: 0 | Refills: 0 | Status: COMPLETED | OUTPATIENT
Start: 2017-04-29 | End: 2017-04-29

## 2017-04-29 RX ORDER — KETOROLAC TROMETHAMINE 30 MG/ML
30 SYRINGE (ML) INJECTION ONCE
Qty: 0 | Refills: 0 | Status: DISCONTINUED | OUTPATIENT
Start: 2017-04-29 | End: 2017-04-29

## 2017-04-29 RX ORDER — MORPHINE SULFATE 50 MG/1
4 CAPSULE, EXTENDED RELEASE ORAL ONCE
Qty: 0 | Refills: 0 | Status: DISCONTINUED | OUTPATIENT
Start: 2017-04-29 | End: 2017-04-29

## 2017-04-29 RX ORDER — SODIUM CHLORIDE 9 MG/ML
1000 INJECTION INTRAMUSCULAR; INTRAVENOUS; SUBCUTANEOUS ONCE
Qty: 0 | Refills: 0 | Status: COMPLETED | OUTPATIENT
Start: 2017-04-29 | End: 2017-04-29

## 2017-04-29 RX ORDER — ACETAMINOPHEN 500 MG
1000 TABLET ORAL ONCE
Qty: 0 | Refills: 0 | Status: COMPLETED | OUTPATIENT
Start: 2017-04-29 | End: 2017-04-29

## 2017-04-29 RX ADMIN — SODIUM CHLORIDE 1000 MILLILITER(S): 9 INJECTION INTRAMUSCULAR; INTRAVENOUS; SUBCUTANEOUS at 07:56

## 2017-04-29 RX ADMIN — Medication 104 MILLIGRAM(S): at 10:06

## 2017-04-29 RX ADMIN — Medication 1000 MILLIGRAM(S): at 08:30

## 2017-04-29 RX ADMIN — Medication 30 MILLIGRAM(S): at 08:30

## 2017-04-29 RX ADMIN — MORPHINE SULFATE 4 MILLIGRAM(S): 50 CAPSULE, EXTENDED RELEASE ORAL at 13:02

## 2017-04-29 RX ADMIN — OXYCODONE HYDROCHLORIDE 5 MILLIGRAM(S): 5 TABLET ORAL at 10:07

## 2017-04-29 RX ADMIN — OXYCODONE HYDROCHLORIDE 5 MILLIGRAM(S): 5 TABLET ORAL at 08:50

## 2017-04-29 RX ADMIN — Medication 400 MILLIGRAM(S): at 07:50

## 2017-04-29 RX ADMIN — Medication 30 MILLIGRAM(S): at 07:45

## 2017-04-29 RX ADMIN — MORPHINE SULFATE 4 MILLIGRAM(S): 50 CAPSULE, EXTENDED RELEASE ORAL at 11:28

## 2017-04-29 NOTE — ED ADULT NURSE NOTE - OBJECTIVE STATEMENT
Pt c/o Left flank area pain radiating to left groin and genitalia area since last night.  Pt also c/o increase in urinary frequency and mild burning with urination.  Pt denies blood in urine.  Pt states pain is similar to when he had renal stones.

## 2017-04-29 NOTE — ED ADULT NURSE REASSESSMENT NOTE - NS ED NURSE REASSESS COMMENT FT1
Pt resting in bed.  Pt states he passed a stone.  Pt states he feels better.  Denies flank pain, abdominal pain, or n/v at this time.  VSS.  Pt evaluated by Urologist.  Pending disposition.

## 2017-04-29 NOTE — ED ADULT NURSE NOTE - CHPI ED SYMPTOMS NEG
no bowel dysfunction/no motor function loss/no numbness/no difficulty bearing weight/no constipation/no neck tenderness/no fatigue/no tingling/no anorexia/no bladder dysfunction

## 2017-04-29 NOTE — ED PROVIDER NOTE - ATTENDING CONTRIBUTION TO CARE
Agree with above.  Pt has a hx of bilateral UVJ stones previously.  In January of this year pt had a R stent placed for a UVJ stone.  Had stents removed last month by uro.  Now presents with 2 days L flank pain radiating to L groin.  Also endorses burning of urethra.  Endorses 10/10 pain.

## 2017-04-29 NOTE — ED ADULT NURSE REASSESSMENT NOTE - NS ED NURSE REASSESS COMMENT FT1
Pt still with c/o Left flank pain 10/10 post medication.  MD notified.  Pt medicated as per MD orders.  Pt denies abdominal pain, n/v or diarrhea at this time.  Pending CT scan and disposition.

## 2017-04-29 NOTE — ED PROVIDER NOTE - OBJECTIVE STATEMENT
Recently admitted to uro under Dr. Adria Montes for R flank pain and had R UVJ stone, on 2/3/17 pt had cystoscopy, R UVJ stone, basket stone extraction, ureteroscopy, R RPG, R 6x26 JJ ureteral stent insertion.  Hx of b/l kidney stones, and UPJ obstruction s/p L pyeloplasty 1/9/17 w/ stent placement    Uro: L flank pain   Recently admitted to uro under Dr. Adria Montes for R flank pain and had R UVJ stone, on 2/3/17 pt had cystoscopy, R UVJ stone, basket stone extraction, ureteroscopy, R RPG, R 6x26 JJ ureteral stent insertion.  Hx of b/l kidney stones, and UPJ obstruction s/p L pyeloplasty 1/9/17 w/ stent placement    Uro: Rodolfo Monte (ped) L flank pain for 24 hrs, initially dull, but now is 10/10 throbbing pain that radiates to L lower abd w/ burning to urethra and repors cloudy urine.   Recently admitted to uro under Dr. Adria Montes for R flank pain and had R UVJ stone, on 2/3/17 pt had cystoscopy, R UVJ stone, basket stone extraction, ureteroscopy, R RPG, R 6x26 JJ ureteral stent insertion.  Hx of b/l kidney stones, and UPJ obstruction s/p L pyeloplasty 1/9/17 w/ stent placement    Uro: Rodolfo Monte (outside)

## 2017-04-29 NOTE — ED PROVIDER NOTE - PROGRESS NOTE DETAILS
[employer]:  Urology has been planning dispo pending U/A.  U/A has resulted with mild hematuria as expected.  In interim pt has passed what appears c/w ~3 mm stone with improvement in sxs.  Urology alerted to change in status and to see pt/examine passed particle. [employer]:  Urology has seen pt and discussed with attending.  Given passage of stone and resolution of sxs pt is stable for discharge and cleared by urology to follow-up with outpt urologist for continued management.  Pt aware of findings.  Given copy of results.  Comfortable with plan to follow-up as outpt.  Take motrin/tylenol for pain control.  Maintain hydration.  Return to Ed for any acutely new/worsening pain.

## 2017-04-29 NOTE — ED PROVIDER NOTE - NS ED ROS FT
No fever, no chills, no change in vision, no change in hearing, no chest pain, no shortness of breath, + abdominal pain, no vomiting, + dysuria, no muscle pain, no rashes, no loss of consciousness. ~ Miguel Lyn MD

## 2017-04-29 NOTE — ED PROVIDER NOTE - PHYSICAL EXAMINATION
Physical Exam: young M in acute distress, AAOx3, NCAT, MMM, PERRLA, CTAB, normal rate and regular rhythm, abdomen is soft and NTND, L CVA tenderness, No edema, No deformity of extremities, No rashes, CN grossly intact, No focal motor or sensory deficits. ~ Miguel Lyn MD

## 2017-04-30 LAB
CULTURE RESULTS: NO GROWTH — SIGNIFICANT CHANGE UP
SPECIMEN SOURCE: SIGNIFICANT CHANGE UP

## 2017-05-03 LAB
COMPN STONE: SIGNIFICANT CHANGE UP
SURGICAL PATHOLOGY STUDY: SIGNIFICANT CHANGE UP

## 2017-11-06 ENCOUNTER — EMERGENCY (EMERGENCY)
Facility: HOSPITAL | Age: 22
LOS: 1 days | Discharge: HOME | End: 2017-11-06
Attending: EMERGENCY MEDICINE | Admitting: EMERGENCY MEDICINE
Payer: MEDICAID

## 2017-11-06 VITALS
RESPIRATION RATE: 22 BRPM | SYSTOLIC BLOOD PRESSURE: 137 MMHG | HEART RATE: 93 BPM | TEMPERATURE: 99 F | OXYGEN SATURATION: 100 % | DIASTOLIC BLOOD PRESSURE: 77 MMHG

## 2017-11-06 DIAGNOSIS — N13.0 HYDRONEPHROSIS WITH URETEROPELVIC JUNCTION OBSTRUCTION: Chronic | ICD-10-CM

## 2017-11-06 LAB
BASOPHILS # BLD AUTO: 0.1 K/UL — SIGNIFICANT CHANGE UP (ref 0–0.2)
BASOPHILS NFR BLD AUTO: 0.5 % — SIGNIFICANT CHANGE UP (ref 0–2)
EOSINOPHIL # BLD AUTO: 0.4 K/UL — SIGNIFICANT CHANGE UP (ref 0–0.5)
EOSINOPHIL NFR BLD AUTO: 3.4 % — SIGNIFICANT CHANGE UP (ref 0–6)
HCT VFR BLD CALC: 37.3 % — LOW (ref 39–50)
HGB BLD-MCNC: 12.3 G/DL — LOW (ref 13–17)
LYMPHOCYTES # BLD AUTO: 2.8 K/UL — SIGNIFICANT CHANGE UP (ref 1–3.3)
LYMPHOCYTES # BLD AUTO: 24.2 % — SIGNIFICANT CHANGE UP (ref 13–44)
MCHC RBC-ENTMCNC: 21 PG — LOW (ref 27–34)
MCHC RBC-ENTMCNC: 32.9 GM/DL — SIGNIFICANT CHANGE UP (ref 32–36)
MCV RBC AUTO: 63.8 FL — LOW (ref 80–100)
MONOCYTES # BLD AUTO: 1 K/UL — HIGH (ref 0–0.9)
MONOCYTES NFR BLD AUTO: 8.5 % — SIGNIFICANT CHANGE UP (ref 2–14)
NEUTROPHILS # BLD AUTO: 7.3 K/UL — SIGNIFICANT CHANGE UP (ref 1.8–7.4)
NEUTROPHILS NFR BLD AUTO: 63.5 % — SIGNIFICANT CHANGE UP (ref 43–77)
PLATELET # BLD AUTO: 269 K/UL — SIGNIFICANT CHANGE UP (ref 150–400)
RBC # BLD: 5.84 M/UL — HIGH (ref 4.2–5.8)
RBC # FLD: 13.9 % — SIGNIFICANT CHANGE UP (ref 10.3–14.5)
WBC # BLD: 11.5 K/UL — HIGH (ref 3.8–10.5)
WBC # FLD AUTO: 11.5 K/UL — HIGH (ref 3.8–10.5)

## 2017-11-06 RX ORDER — MORPHINE SULFATE 50 MG/1
4 CAPSULE, EXTENDED RELEASE ORAL ONCE
Qty: 0 | Refills: 0 | Status: DISCONTINUED | OUTPATIENT
Start: 2017-11-06 | End: 2017-11-06

## 2017-11-06 RX ORDER — KETOROLAC TROMETHAMINE 30 MG/ML
15 SYRINGE (ML) INJECTION ONCE
Qty: 0 | Refills: 0 | Status: DISCONTINUED | OUTPATIENT
Start: 2017-11-06 | End: 2017-11-06

## 2017-11-06 RX ORDER — ONDANSETRON 8 MG/1
4 TABLET, FILM COATED ORAL ONCE
Qty: 0 | Refills: 0 | Status: COMPLETED | OUTPATIENT
Start: 2017-11-06 | End: 2017-11-06

## 2017-11-06 RX ORDER — HYDROMORPHONE HYDROCHLORIDE 2 MG/ML
1 INJECTION INTRAMUSCULAR; INTRAVENOUS; SUBCUTANEOUS ONCE
Qty: 0 | Refills: 0 | Status: DISCONTINUED | OUTPATIENT
Start: 2017-11-06 | End: 2017-11-06

## 2017-11-06 RX ADMIN — Medication 15 MILLIGRAM(S): at 23:22

## 2017-11-06 RX ADMIN — Medication 15 MILLIGRAM(S): at 23:20

## 2017-11-06 RX ADMIN — ONDANSETRON 4 MILLIGRAM(S): 8 TABLET, FILM COATED ORAL at 23:20

## 2017-11-06 RX ADMIN — MORPHINE SULFATE 4 MILLIGRAM(S): 50 CAPSULE, EXTENDED RELEASE ORAL at 23:27

## 2017-11-06 NOTE — ED PROVIDER NOTE - OBJECTIVE STATEMENT
Attending Rafael: 21 y/o male h/o sig renal stones in the past with h/o congenital disease to ureter requiring previous dilation and h/o pyeloplasty and ureteral stents in the past presenting with flank pain. pt states pain located in left flank and assocaited with nausea and vomiting. reports some cloudiness to the urine. pain intermittent in intensity but constantly present. took motrin at home more then 6 hours ago without improvement. does report some discomfort to his testicles. no drainage. pain similar to previous renal colic but worse. no numbness or tingling  urologist in [facility] but has seen dr reyna in the past

## 2017-11-06 NOTE — ED PROVIDER NOTE - MEDICAL DECISION MAKING DETAILS
23 y/o male with complex  history with h/o ureteral colic s/p procedures presenting with left flank pain. pt uncomfortable in pain upon arrival. no testicular pain to suggest torsion. history and exam suggestive of likely ureteral colic. pt with residual hydro based on prior imaging and seen on ultrasound today. pt with multiple ct scan in the past and with young age at this time will manage symptomatically. consider mri to further evaluate. UA shows no eivdence of infection. pt afebrile

## 2017-11-06 NOTE — ED PROVIDER NOTE - PHYSICAL EXAMINATION
Attending Hall: Gen: NAD, heent: atrauamtic, eomi, perrla, mmm, op pink, uvula midline, neck; nttp, no nuchal rigidity, chest: nttp, no crepitus, cv: rrr, no murmurs, lungs: ctab, abd: soft, nontender, nondistended, no peritoneal signs, +BS, no guarding, ext: , left flank ttp, wwp, neg homans, : no testicular ttp or erythema, skin: no rash, neuro: awake and alert, following commands, speech clear, sensation and strength intact, no focal deficits

## 2017-11-06 NOTE — ED ADULT NURSE NOTE - OBJECTIVE STATEMENT
22 y.o male w. PMH of UPJ, kidney stones came to the ER w. c/o left sided flank pain w. n/v  1 day. Pt this is a continuos ongoing problem for him w. kidney stones, pt has 2 episodes of vomiting. Pt denies any CP, SOB, fever, chills, h/a, dizziness. Pt is axox3, lungs CTA, abdomen soft +bs, pt seen rocking and back fourth in colicky pain, pain medication and IV fluids given as prescribed. Safety and comfort maintained.

## 2017-11-07 VITALS
TEMPERATURE: 98 F | SYSTOLIC BLOOD PRESSURE: 108 MMHG | RESPIRATION RATE: 18 BRPM | OXYGEN SATURATION: 99 % | DIASTOLIC BLOOD PRESSURE: 72 MMHG | HEART RATE: 82 BPM

## 2017-11-07 LAB
ALBUMIN SERPL ELPH-MCNC: 4.5 G/DL — SIGNIFICANT CHANGE UP (ref 3.3–5)
ALP SERPL-CCNC: 59 U/L — SIGNIFICANT CHANGE UP (ref 40–120)
ALT FLD-CCNC: 11 U/L RC — SIGNIFICANT CHANGE UP (ref 10–45)
ANION GAP SERPL CALC-SCNC: 10 MMOL/L — SIGNIFICANT CHANGE UP (ref 5–17)
ANION GAP SERPL CALC-SCNC: 13 MMOL/L — SIGNIFICANT CHANGE UP (ref 5–17)
ANISOCYTOSIS BLD QL: SLIGHT — SIGNIFICANT CHANGE UP
APPEARANCE UR: CLEAR — SIGNIFICANT CHANGE UP
AST SERPL-CCNC: 17 U/L — SIGNIFICANT CHANGE UP (ref 10–40)
BASOPHILS # BLD AUTO: 0 K/UL — SIGNIFICANT CHANGE UP (ref 0–0.2)
BASOPHILS NFR BLD AUTO: 0.4 % — SIGNIFICANT CHANGE UP (ref 0–2)
BILIRUB SERPL-MCNC: 0.3 MG/DL — SIGNIFICANT CHANGE UP (ref 0.2–1.2)
BILIRUB UR-MCNC: NEGATIVE — SIGNIFICANT CHANGE UP
BUN SERPL-MCNC: 13 MG/DL — SIGNIFICANT CHANGE UP (ref 7–23)
BUN SERPL-MCNC: 15 MG/DL — SIGNIFICANT CHANGE UP (ref 7–23)
CALCIUM SERPL-MCNC: 9 MG/DL — SIGNIFICANT CHANGE UP (ref 8.4–10.5)
CALCIUM SERPL-MCNC: 9.6 MG/DL — SIGNIFICANT CHANGE UP (ref 8.4–10.5)
CHLORIDE SERPL-SCNC: 102 MMOL/L — SIGNIFICANT CHANGE UP (ref 96–108)
CHLORIDE SERPL-SCNC: 108 MMOL/L — SIGNIFICANT CHANGE UP (ref 96–108)
CO2 SERPL-SCNC: 26 MMOL/L — SIGNIFICANT CHANGE UP (ref 22–31)
CO2 SERPL-SCNC: 27 MMOL/L — SIGNIFICANT CHANGE UP (ref 22–31)
COD CRY URNS QL: ABNORMAL
COLOR SPEC: YELLOW — SIGNIFICANT CHANGE UP
COMMENT - URINE: SIGNIFICANT CHANGE UP
CREAT SERPL-MCNC: 1.06 MG/DL — SIGNIFICANT CHANGE UP (ref 0.5–1.3)
CREAT SERPL-MCNC: 1.32 MG/DL — HIGH (ref 0.5–1.3)
DACRYOCYTES BLD QL SMEAR: SLIGHT — SIGNIFICANT CHANGE UP
DIFF PNL FLD: ABNORMAL
EOSINOPHIL # BLD AUTO: 0.3 K/UL — SIGNIFICANT CHANGE UP (ref 0–0.5)
EOSINOPHIL NFR BLD AUTO: 3.1 % — SIGNIFICANT CHANGE UP (ref 0–6)
GLUCOSE SERPL-MCNC: 113 MG/DL — HIGH (ref 70–99)
GLUCOSE SERPL-MCNC: 94 MG/DL — SIGNIFICANT CHANGE UP (ref 70–99)
GLUCOSE UR QL: NEGATIVE — SIGNIFICANT CHANGE UP
HCT VFR BLD CALC: 33.7 % — LOW (ref 39–50)
HGB BLD-MCNC: 11.1 G/DL — LOW (ref 13–17)
HYPOCHROMIA BLD QL: SIGNIFICANT CHANGE UP
KETONES UR-MCNC: NEGATIVE — SIGNIFICANT CHANGE UP
LEUKOCYTE ESTERASE UR-ACNC: ABNORMAL
LYMPHOCYTES # BLD AUTO: 2.6 K/UL — SIGNIFICANT CHANGE UP (ref 1–3.3)
LYMPHOCYTES # BLD AUTO: 23.4 % — SIGNIFICANT CHANGE UP (ref 13–44)
MCHC RBC-ENTMCNC: 21.2 PG — LOW (ref 27–34)
MCHC RBC-ENTMCNC: 33 GM/DL — SIGNIFICANT CHANGE UP (ref 32–36)
MCV RBC AUTO: 64.3 FL — LOW (ref 80–100)
MICROCYTES BLD QL: SIGNIFICANT CHANGE UP
MONOCYTES # BLD AUTO: 1.1 K/UL — HIGH (ref 0–0.9)
MONOCYTES NFR BLD AUTO: 9.9 % — SIGNIFICANT CHANGE UP (ref 2–14)
NEUTROPHILS # BLD AUTO: 6.9 K/UL — SIGNIFICANT CHANGE UP (ref 1.8–7.4)
NEUTROPHILS NFR BLD AUTO: 63.1 % — SIGNIFICANT CHANGE UP (ref 43–77)
NITRITE UR-MCNC: NEGATIVE — SIGNIFICANT CHANGE UP
OVALOCYTES BLD QL SMEAR: SLIGHT — SIGNIFICANT CHANGE UP
PH UR: 6 — SIGNIFICANT CHANGE UP (ref 5–8)
PLAT MORPH BLD: NORMAL — SIGNIFICANT CHANGE UP
PLATELET # BLD AUTO: 216 K/UL — SIGNIFICANT CHANGE UP (ref 150–400)
POIKILOCYTOSIS BLD QL AUTO: SLIGHT — SIGNIFICANT CHANGE UP
POLYCHROMASIA BLD QL SMEAR: SLIGHT — SIGNIFICANT CHANGE UP
POTASSIUM SERPL-MCNC: 4.4 MMOL/L — SIGNIFICANT CHANGE UP (ref 3.5–5.3)
POTASSIUM SERPL-MCNC: 4.5 MMOL/L — SIGNIFICANT CHANGE UP (ref 3.5–5.3)
POTASSIUM SERPL-SCNC: 4.4 MMOL/L — SIGNIFICANT CHANGE UP (ref 3.5–5.3)
POTASSIUM SERPL-SCNC: 4.5 MMOL/L — SIGNIFICANT CHANGE UP (ref 3.5–5.3)
PROT SERPL-MCNC: 7.2 G/DL — SIGNIFICANT CHANGE UP (ref 6–8.3)
PROT UR-MCNC: 100 MG/DL
RBC # BLD: 5.24 M/UL — SIGNIFICANT CHANGE UP (ref 4.2–5.8)
RBC # FLD: 14 % — SIGNIFICANT CHANGE UP (ref 10.3–14.5)
RBC BLD AUTO: ABNORMAL
RBC CASTS # UR COMP ASSIST: >50 /HPF (ref 0–2)
SODIUM SERPL-SCNC: 141 MMOL/L — SIGNIFICANT CHANGE UP (ref 135–145)
SODIUM SERPL-SCNC: 145 MMOL/L — SIGNIFICANT CHANGE UP (ref 135–145)
SP GR SPEC: 1.03 — HIGH (ref 1.01–1.02)
UROBILINOGEN FLD QL: NEGATIVE — SIGNIFICANT CHANGE UP
WBC # BLD: 11 K/UL — HIGH (ref 3.8–10.5)
WBC # FLD AUTO: 11 K/UL — HIGH (ref 3.8–10.5)
WBC UR QL: SIGNIFICANT CHANGE UP /HPF (ref 0–5)

## 2017-11-07 PROCEDURE — 74176 CT ABD & PELVIS W/O CONTRAST: CPT | Mod: 26

## 2017-11-07 PROCEDURE — 80048 BASIC METABOLIC PNL TOTAL CA: CPT

## 2017-11-07 PROCEDURE — 96376 TX/PRO/DX INJ SAME DRUG ADON: CPT

## 2017-11-07 PROCEDURE — 74000: CPT | Mod: 26

## 2017-11-07 PROCEDURE — 96375 TX/PRO/DX INJ NEW DRUG ADDON: CPT

## 2017-11-07 PROCEDURE — 99284 EMERGENCY DEPT VISIT MOD MDM: CPT | Mod: 25

## 2017-11-07 PROCEDURE — 74018 RADEX ABDOMEN 1 VIEW: CPT

## 2017-11-07 PROCEDURE — G0378: CPT

## 2017-11-07 PROCEDURE — 74176 CT ABD & PELVIS W/O CONTRAST: CPT

## 2017-11-07 PROCEDURE — 85027 COMPLETE CBC AUTOMATED: CPT

## 2017-11-07 PROCEDURE — 96374 THER/PROPH/DIAG INJ IV PUSH: CPT

## 2017-11-07 PROCEDURE — 76775 US EXAM ABDO BACK WALL LIM: CPT

## 2017-11-07 PROCEDURE — 80053 COMPREHEN METABOLIC PANEL: CPT

## 2017-11-07 PROCEDURE — 99236 HOSP IP/OBS SAME DATE HI 85: CPT

## 2017-11-07 PROCEDURE — 87086 URINE CULTURE/COLONY COUNT: CPT

## 2017-11-07 PROCEDURE — 81001 URINALYSIS AUTO W/SCOPE: CPT

## 2017-11-07 PROCEDURE — 76775 US EXAM ABDO BACK WALL LIM: CPT | Mod: 26

## 2017-11-07 RX ORDER — OXYCODONE HYDROCHLORIDE 5 MG/1
5 TABLET ORAL ONCE
Qty: 0 | Refills: 0 | Status: DISCONTINUED | OUTPATIENT
Start: 2017-11-07 | End: 2017-11-07

## 2017-11-07 RX ORDER — SODIUM CHLORIDE 9 MG/ML
1000 INJECTION INTRAMUSCULAR; INTRAVENOUS; SUBCUTANEOUS ONCE
Qty: 0 | Refills: 0 | Status: COMPLETED | OUTPATIENT
Start: 2017-11-07 | End: 2017-11-07

## 2017-11-07 RX ORDER — KETOROLAC TROMETHAMINE 30 MG/ML
15 SYRINGE (ML) INJECTION EVERY 6 HOURS
Qty: 0 | Refills: 0 | Status: DISCONTINUED | OUTPATIENT
Start: 2017-11-07 | End: 2017-11-07

## 2017-11-07 RX ORDER — OXYCODONE HYDROCHLORIDE 5 MG/1
1 TABLET ORAL
Qty: 12 | Refills: 0 | OUTPATIENT
Start: 2017-11-07 | End: 2017-11-10

## 2017-11-07 RX ORDER — ONDANSETRON 8 MG/1
1 TABLET, FILM COATED ORAL
Qty: 9 | Refills: 0
Start: 2017-11-07 | End: 2017-11-10

## 2017-11-07 RX ORDER — ACETAMINOPHEN 500 MG
1000 TABLET ORAL ONCE
Qty: 0 | Refills: 0 | Status: COMPLETED | OUTPATIENT
Start: 2017-11-07 | End: 2017-11-07

## 2017-11-07 RX ORDER — HYDROMORPHONE HYDROCHLORIDE 2 MG/ML
1 INJECTION INTRAMUSCULAR; INTRAVENOUS; SUBCUTANEOUS EVERY 6 HOURS
Qty: 0 | Refills: 0 | Status: DISCONTINUED | OUTPATIENT
Start: 2017-11-07 | End: 2017-11-07

## 2017-11-07 RX ORDER — SODIUM CHLORIDE 9 MG/ML
3 INJECTION INTRAMUSCULAR; INTRAVENOUS; SUBCUTANEOUS EVERY 8 HOURS
Qty: 0 | Refills: 0 | Status: DISCONTINUED | OUTPATIENT
Start: 2017-11-07 | End: 2017-11-11

## 2017-11-07 RX ORDER — ONDANSETRON 8 MG/1
4 TABLET, FILM COATED ORAL EVERY 6 HOURS
Qty: 0 | Refills: 0 | Status: DISCONTINUED | OUTPATIENT
Start: 2017-11-07 | End: 2017-11-11

## 2017-11-07 RX ORDER — SODIUM CHLORIDE 9 MG/ML
1000 INJECTION INTRAMUSCULAR; INTRAVENOUS; SUBCUTANEOUS
Qty: 0 | Refills: 0 | Status: DISCONTINUED | OUTPATIENT
Start: 2017-11-07 | End: 2017-11-11

## 2017-11-07 RX ADMIN — HYDROMORPHONE HYDROCHLORIDE 1 MILLIGRAM(S): 2 INJECTION INTRAMUSCULAR; INTRAVENOUS; SUBCUTANEOUS at 01:22

## 2017-11-07 RX ADMIN — HYDROMORPHONE HYDROCHLORIDE 1 MILLIGRAM(S): 2 INJECTION INTRAMUSCULAR; INTRAVENOUS; SUBCUTANEOUS at 10:48

## 2017-11-07 RX ADMIN — OXYCODONE HYDROCHLORIDE 5 MILLIGRAM(S): 5 TABLET ORAL at 06:36

## 2017-11-07 RX ADMIN — OXYCODONE HYDROCHLORIDE 5 MILLIGRAM(S): 5 TABLET ORAL at 16:48

## 2017-11-07 RX ADMIN — SODIUM CHLORIDE 3 MILLILITER(S): 9 INJECTION INTRAMUSCULAR; INTRAVENOUS; SUBCUTANEOUS at 16:29

## 2017-11-07 RX ADMIN — SODIUM CHLORIDE 1000 MILLILITER(S): 9 INJECTION INTRAMUSCULAR; INTRAVENOUS; SUBCUTANEOUS at 02:28

## 2017-11-07 RX ADMIN — MORPHINE SULFATE 4 MILLIGRAM(S): 50 CAPSULE, EXTENDED RELEASE ORAL at 01:22

## 2017-11-07 RX ADMIN — SODIUM CHLORIDE 125 MILLILITER(S): 9 INJECTION INTRAMUSCULAR; INTRAVENOUS; SUBCUTANEOUS at 11:03

## 2017-11-07 RX ADMIN — Medication 400 MILLIGRAM(S): at 02:40

## 2017-11-07 RX ADMIN — OXYCODONE HYDROCHLORIDE 5 MILLIGRAM(S): 5 TABLET ORAL at 07:06

## 2017-11-07 RX ADMIN — Medication 1000 MILLIGRAM(S): at 17:22

## 2017-11-07 RX ADMIN — Medication 1000 MILLIGRAM(S): at 03:12

## 2017-11-07 RX ADMIN — SODIUM CHLORIDE 125 MILLILITER(S): 9 INJECTION INTRAMUSCULAR; INTRAVENOUS; SUBCUTANEOUS at 02:40

## 2017-11-07 RX ADMIN — SODIUM CHLORIDE 3 MILLILITER(S): 9 INJECTION INTRAMUSCULAR; INTRAVENOUS; SUBCUTANEOUS at 05:48

## 2017-11-07 RX ADMIN — HYDROMORPHONE HYDROCHLORIDE 1 MILLIGRAM(S): 2 INJECTION INTRAMUSCULAR; INTRAVENOUS; SUBCUTANEOUS at 00:25

## 2017-11-07 RX ADMIN — HYDROMORPHONE HYDROCHLORIDE 1 MILLIGRAM(S): 2 INJECTION INTRAMUSCULAR; INTRAVENOUS; SUBCUTANEOUS at 16:29

## 2017-11-07 RX ADMIN — Medication 400 MILLIGRAM(S): at 14:18

## 2017-11-07 RX ADMIN — SODIUM CHLORIDE 1000 MILLILITER(S): 9 INJECTION INTRAMUSCULAR; INTRAVENOUS; SUBCUTANEOUS at 01:27

## 2017-11-07 NOTE — ED CDU PROVIDER INITIAL DAY NOTE - OBJECTIVE STATEMENT
23 y/o male h/o sig renal stones in the past with h/o congenital disease to ureter requiring previous dilation and h/o pyeloplasty and ureteral stents in the past presenting with flank pain. pt states pain located in left flank and associated with nausea and vomiting. reports some cloudiness to the urine. pain intermittent in intensity but constantly present. took motrin at home more then 6 hours ago without improvement. does report some discomfort to his testicles. no drainage. pain similar to previous renal colic but worse. no numbness or tingling  urologist in [facility] but has seen dr reyna in the past    In ED patient states he is still having pain to the left flank  evaluated by urology. Patient does not desire to have another CT and Dr. Hall agrees, ordered for US renal, KUB.

## 2017-11-07 NOTE — CONSULT NOTE ADULT - SUBJECTIVE AND OBJECTIVE BOX
22yMale with sig PMH  hx of bl UPJ obstruction - left repaired Dec 2016 and right May 2017- by [facility] urology [facility]. Has been managed by Dr. Adria Montes while in [facility] for nephrolithiasis requiring stenting earlier this yr. In Aug 2017 had lasix renal scan to assess for obstruction by primary urologist he thought "the left may have some scar tissue Pt does not know percentages. Was supposed to fu with MRI.  Since UPJ repairs, pt feels flank pains cate when he drinks a lot. Pain worse on the left. He self manages with Motrin and drinking smaller quantities freq.   Yesterday, he began having his left flank pain and so he limited po liquid intake, took a motrin and a nap. Pain did not go away so pt became concerned for stone therefore came to ED. Sono + bl hydro and KUB negative. spent one night in CDU undergoing medical expulsive therapy for presumed stone without success.   Has been requiring po and iv options to control left flank pain. States on the whole is better than yesterday  No fevers/ chills/ sensation of retention/ dysuria/ hematuria.   Last stone passage 2017.     PAST MEDICAL & SURGICAL HISTORY:  Ureteropelvic junction (UPJ) obstruction  Renal calculi  Acquired hydronephrosis with ureteropelvic junction (UPJ) obstruction: s/p left repair 2016      MEDICATIONS  (STANDING):  sodium chloride 0.9% lock flush 3 milliLiter(s) IV Push every 8 hours  sodium chloride 0.9%. 1000 milliLiter(s) (125 mL/Hr) IV Continuous <Continuous>    MEDICATIONS  (PRN):  HYDROmorphone  Injectable 1 milliGRAM(s) IV Push every 6 hours PRN Severe Pain (7 - 10)  ketorolac   Injectable 15 milliGRAM(s) IV Push every 6 hours PRN Moderate Pain (4 - 6)  ondansetron Injectable 4 milliGRAM(s) IV Push every 6 hours PRN Nausea and/or Vomiting      FAMILY HISTORY:  No pertinent family history in first degree relatives      Allergies    No Known Allergies    Intolerances        SOCIAL HISTORY:  student  no etoh/tob/ illicit drugs     REVIEW OF SYSTEMS: Otherwise negative as stated in HPI    Physical Exam  Vital signs  T(C): 36.8 (17 @ 12:24), Max: 37.2 (17 @ 12:14)  HR: 79 (17 @ 12:24)  BP: 104/70 (17 @ 12:24)  SpO2: 98% (17 @ 12:24)  Wt(kg): --    Output  I&O's Summary    UOP unable to assess     Gen:  AWAKE ALERT NAD AXOX3    Pulm:  NO RESP DISTRESS  	  CV:  S1S2    GI:  SOFT NT/ND  MIN LEFT CVAT     : DEFERRED                             	      LABS:                            11.1   11.0  )-----------( 216      ( 2017 08:21 )             33.7           145  |  108  |  13  ----------------------------<  94  4.4   |  27  |  1.06    Ca    9.0      2017 08:21    TPro  7.2  /  Alb  4.5  /  TBili  0.3  /  DBili  x   /  AST  17  /  ALT  11  /  AlkPhos  59  11-06      Urinalysis Basic - ( 2017 00:38 )    Color: Yellow / Appearance: Clear / S.026 / pH: x  Gluc: x / Ketone: Negative  / Bili: Negative / Urobili: Negative   Blood: x / Protein: 100 mg/dL / Nitrite: Negative   Leuk Esterase: Trace / RBC: >50 /HPF / WBC 6-10 /HPF   Sq Epi: x / Non Sq Epi: x / Bacteria: x            RADIOLOGY:    < from: CT Abdomen and Pelvis No Cont (17 @ 13:42) >  FINDINGS:    LOWER CHEST: Within normal limits.    LIVER: Within normal limits.  BILE DUCTS: Normal caliber.  GALLBLADDER: Within normal limits.  SPLEEN: Within normal limits.  PANCREAS: Within normal limits.  ADRENALS: Within normal limits.  KIDNEYS/URETERS: Moderate bilateral hydronephrosis, slightly improved on   the left as compared to 2017. The previously noted proximal left   ureteral calculus is no longer seen. The ureters are normal in caliber,   raising consideration of ureteropelvic junction obstruction. Cortical   atrophy is noted in the upper pole of the right kidney. Bilateral   nonobstructing intrarenal calculi are again seen. The largest calculus is   in the lower pole of the left kidney, measuring 1.2 cm.    BLADDER: Within normal limits.  REPRODUCTIVE ORGANS: The prostate is within normal limits.    BOWEL: No bowel obstruction. Appendix is within normal limits.  PERITONEUM: Small amount of free fluid in the pelvis.  VESSELS:  Within normal limits.  RETROPERITONEUM: No lymphadenopathy.    ABDOMINAL WALL: Within normal limits.  BONES: Within normal limits.    IMPRESSION: Moderate bilateral hydronephrosis, slightly improved on the   left since 2017. The previously noted proximal left ureteral   calculus is no longer seen. Appearance suggestive of bilateral UPJ   obstructions.    Bilateral nonobstructing intrarenal calculi.    Trace free fluid in the pelvis.        < end of copied text >

## 2017-11-07 NOTE — ED CDU PROVIDER DISPOSITION NOTE - PLAN OF CARE
1.STAY HYDRATED and Strain Urine.   2.Follow up with your Primary Care Physician within the next 2-3 days or medicine clinic [phone] as well as urology clinic [phone]. Bring a copy of your test results with you to your appointment  3.Continue your current medication regimen. hours as needed for pain.  Take Flomax once daily. Take Motrin 400-600mg every 6 hrs as needed for pain. Take with food. For severe pain take oxycodone 5mg every 6 hours, do not drive while taking this medication   4.Return to the Emergency Room if you experience new or worsening symptoms .Oxycodone 5mg every 6 1.STAY HYDRATEd  2.Follow up with your Primary Care Physician within the next 2-3 days or medicine clinic [phone]     Follow up with urology, Dr. Adria reyna [phone]  3.Continue your current medication regimen. hours as needed for pain.  Take Motrin 400-600mg every 6 hrs as needed for pain. Take with food. For severe pain take oxycodone 5mg every 6 hours, do not drive while taking this medication   4.Return to the Emergency Room for worsening pain, fevers/chills or any other concerning symptoms

## 2017-11-07 NOTE — CONSULT NOTE ADULT - ASSESSMENT
21 YO male with left renal colic, bl hydronephrosis, bl intrarenal stones - non obstructing 23 YO male with left renal colic, bl hydronephrosis, bl intrarenal stones - non obstructing. Normal renal function and without signs of infection   discussed findings with Dr. Montes and patient. At this time he feels pain controlled well enough to go home with oral medications. Will follow up in the office and transition care to Dr. Montes as primary urologist.  [facility]  [address] [place] [address]   [phone]

## 2017-11-07 NOTE — ED ADULT NURSE REASSESSMENT NOTE - NS ED NURSE REASSESS COMMENT FT1
11 am Pt C/O pain Medicated as per order continue to monitor
130 Pt got evaluated by urology team . Pt is for discharge Pt pain under control > Had no N/V/D fever chills cp sob Pt safe to go home
Pt received from SHRUTHI Cardenas. Pt oriented to CDU & plan of care was discussed. Pt complains of 6/10 flank pain. Medicated as per MAR. Pt also states he feels like he doesn't complete a full stream when urinating. Safety & comfort measures maintained. Call bell in reach. Will continue to monitor.
Pt is  awaiting for  MRI of he abdomen pt kept NPO

## 2017-11-07 NOTE — ED CDU PROVIDER INITIAL DAY NOTE - DETAILS
Renal Colic  1. Frequent reevaluations  2. IV hydration  3. Pain management  4. MRI abdomen  5. Urology following   Plan d/w Dr. Hall

## 2017-11-07 NOTE — ED CDU PROVIDER INITIAL DAY NOTE - MEDICAL DECISION MAKING DETAILS
Attending Rafael: 23 y/o male h/o left flank pain with h/o ureteral colic, strictures in the past. UA shows no evidence of infection. no testicular pain to suggest torsion. no penile drainage or discharge. pt placed in the cdu for likely ureteral colic. urology consulted. will pain control, consider MRI vs CT

## 2017-11-07 NOTE — ED CDU PROVIDER DISPOSITION NOTE - ATTENDING CONTRIBUTION TO CARE
Patient in CDU for urology evaluation, initial plan for abdominal MRI for possible renal stone, however urology reporting this would not be sufficient and requesting CT A/P for evaluation.  Patient amenable to CT A/P, which revealed hydronephrosis/UVJ obstruction but no noted stones.  Urology reporting no further intervention but pain control at home and outpatient follow up.  Patient amenable to discharge with urology follow up.

## 2017-11-07 NOTE — ED CDU PROVIDER INITIAL DAY NOTE - PROGRESS NOTE DETAILS
CDU NOTE BARAK Victoria: VSS NAD. Patient is currently sleeping comfortably CDU NOTE BARAK Victoria: VSS NAD. Patient is resting comfortably c/o 5/10 flank pain. wants to try oral medication, oxycodone ordered Patient seen at bedside in NAD.  VSS.  Patient resting comfortably.  c/o persistent left sided flank pain.  Discussed with urology.  Will hold off on MRI for now and urology will reevaluate at bedside.  -Lon Lim PA-C Patient seen at bedside in NAD.  VSS.  Patient resting comfortably.  Urology recommending CTAP for further evaluation of patient's pain.  Patient now agreeable to CT scan.  -Lon Lim PA-C CT showing stable BL hydronephrosis.  Urology recommending pain control and outpatient follow up with Dr. Montes.  Patient pain well controlled.  Tolerating PO  case d/w Dr. Garcia. -Lon Lim PA-C Patient in CDU for urology evaluation, initial plan for abdominal MRI for possible renal stone, however urology reporting this would not be sufficient and requesting CT A/P for evaluation.  Patient amenable to CT A/P, which revealed hydronephrosis/UVJ obstruction but no noted stones.  Urology reporting no further intervention but pain control at home and outpatient follow up.  Patient amenable to discharge with urology follow up.

## 2017-11-07 NOTE — ED CDU PROVIDER DISPOSITION NOTE - CLINICAL COURSE
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 Patient states pain located in left flank and associated with nausea and vomiting associated with some cloudiness to the urine. Pain was intermittent in intensity but constantly present similar to previous renal stones. Took motrin at home  without improvement.  In ED patient labs mostly unchanged from baseline, evaluated by urology. Patient and attending decided to not CT patient given young age and mult CTs in past. Patient observed in CDU for pain control and MRI abdomen which revealed______  	    	In ED patient states he is still having pain to the left flank  evaluated by urology. Patient does not desire to have another CT and Dr. Hall agrees, ordered for US renal, KUB. 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 Patient states pain located in left flank and associated with nausea and vomiting associated with some cloudiness to the urine. Pain was intermittent in intensity but constantly present similar to previous renal stones. Took motrin at home  without improvement.  In ED patient labs mostly unchanged from baseline, evaluated by urology. Patient and attending decided to not CT patient given young age and mult CTs in past. Patient observed in CDU for pain control.  Urology consult recommending CT, patient agreeable.  CT showing stable BL hydro.  urology recommending pain control and outpatient follow up.  patient tolerating PO and stable for discharge.

## 2017-11-08 LAB
CULTURE RESULTS: NO GROWTH — SIGNIFICANT CHANGE UP
SPECIMEN SOURCE: SIGNIFICANT CHANGE UP

## 2017-11-14 RX ORDER — OXYBUTYNIN CHLORIDE 5 MG
1 TABLET ORAL
Qty: 0 | Refills: 0 | COMMUNITY

## 2017-11-17 ENCOUNTER — INPATIENT (INPATIENT)
Facility: HOSPITAL | Age: 22
LOS: 1 days | Discharge: HOME | DRG: 694 | End: 2017-11-19
Attending: UROLOGY | Admitting: ORTHOPAEDIC SURGERY
Payer: MEDICAID

## 2017-11-17 VITALS
SYSTOLIC BLOOD PRESSURE: 126 MMHG | HEART RATE: 80 BPM | DIASTOLIC BLOOD PRESSURE: 104 MMHG | TEMPERATURE: 98 F | RESPIRATION RATE: 18 BRPM | OXYGEN SATURATION: 98 %

## 2017-11-17 DIAGNOSIS — N13.0 HYDRONEPHROSIS WITH URETEROPELVIC JUNCTION OBSTRUCTION: Chronic | ICD-10-CM

## 2017-11-17 DIAGNOSIS — N23 UNSPECIFIED RENAL COLIC: ICD-10-CM

## 2017-11-17 PROBLEM — N20.0 CALCULUS OF KIDNEY: Chronic | Status: ACTIVE | Noted: 2017-02-01

## 2017-11-17 LAB
ALBUMIN SERPL ELPH-MCNC: 4.8 G/DL — SIGNIFICANT CHANGE UP (ref 3.3–5)
ALP SERPL-CCNC: 52 U/L — SIGNIFICANT CHANGE UP (ref 40–120)
ALT FLD-CCNC: 12 U/L RC — SIGNIFICANT CHANGE UP (ref 10–45)
ANION GAP SERPL CALC-SCNC: 12 MMOL/L — SIGNIFICANT CHANGE UP (ref 5–17)
APPEARANCE UR: CLEAR — SIGNIFICANT CHANGE UP
APTT BLD: 33.2 SEC — SIGNIFICANT CHANGE UP (ref 27.5–37.4)
AST SERPL-CCNC: 14 U/L — SIGNIFICANT CHANGE UP (ref 10–40)
BASOPHILS # BLD AUTO: 0.1 K/UL — SIGNIFICANT CHANGE UP (ref 0–0.2)
BASOPHILS NFR BLD AUTO: 0.7 % — SIGNIFICANT CHANGE UP (ref 0–2)
BILIRUB SERPL-MCNC: 0.4 MG/DL — SIGNIFICANT CHANGE UP (ref 0.2–1.2)
BILIRUB UR-MCNC: NEGATIVE — SIGNIFICANT CHANGE UP
BLD GP AB SCN SERPL QL: NEGATIVE — SIGNIFICANT CHANGE UP
BUN SERPL-MCNC: 22 MG/DL — SIGNIFICANT CHANGE UP (ref 7–23)
CALCIUM SERPL-MCNC: 10.1 MG/DL — SIGNIFICANT CHANGE UP (ref 8.4–10.5)
CHLORIDE SERPL-SCNC: 104 MMOL/L — SIGNIFICANT CHANGE UP (ref 96–108)
CO2 SERPL-SCNC: 25 MMOL/L — SIGNIFICANT CHANGE UP (ref 22–31)
COD CRY URNS QL: ABNORMAL
COLOR SPEC: YELLOW — SIGNIFICANT CHANGE UP
CREAT SERPL-MCNC: 1.18 MG/DL — SIGNIFICANT CHANGE UP (ref 0.5–1.3)
DIFF PNL FLD: ABNORMAL
EOSINOPHIL # BLD AUTO: 0.5 K/UL — SIGNIFICANT CHANGE UP (ref 0–0.5)
EOSINOPHIL NFR BLD AUTO: 4.8 % — SIGNIFICANT CHANGE UP (ref 0–6)
GAS PNL BLDV: SIGNIFICANT CHANGE UP
GLUCOSE SERPL-MCNC: 91 MG/DL — SIGNIFICANT CHANGE UP (ref 70–99)
GLUCOSE UR QL: NEGATIVE — SIGNIFICANT CHANGE UP
HCT VFR BLD CALC: 34.3 % — LOW (ref 39–50)
HGB BLD-MCNC: 11.6 G/DL — LOW (ref 13–17)
INR BLD: 1.13 RATIO — SIGNIFICANT CHANGE UP (ref 0.88–1.16)
KETONES UR-MCNC: NEGATIVE — SIGNIFICANT CHANGE UP
LEUKOCYTE ESTERASE UR-ACNC: ABNORMAL
LYMPHOCYTES # BLD AUTO: 2.9 K/UL — SIGNIFICANT CHANGE UP (ref 1–3.3)
LYMPHOCYTES # BLD AUTO: 30.4 % — SIGNIFICANT CHANGE UP (ref 13–44)
MCHC RBC-ENTMCNC: 21.5 PG — LOW (ref 27–34)
MCHC RBC-ENTMCNC: 33.9 GM/DL — SIGNIFICANT CHANGE UP (ref 32–36)
MCV RBC AUTO: 63.5 FL — LOW (ref 80–100)
MONOCYTES # BLD AUTO: 0.9 K/UL — SIGNIFICANT CHANGE UP (ref 0–0.9)
MONOCYTES NFR BLD AUTO: 9.9 % — SIGNIFICANT CHANGE UP (ref 2–14)
NEUTROPHILS # BLD AUTO: 5.1 K/UL — SIGNIFICANT CHANGE UP (ref 1.8–7.4)
NEUTROPHILS NFR BLD AUTO: 54.3 % — SIGNIFICANT CHANGE UP (ref 43–77)
NITRITE UR-MCNC: NEGATIVE — SIGNIFICANT CHANGE UP
PH UR: 6 — SIGNIFICANT CHANGE UP (ref 5–8)
PLATELET # BLD AUTO: 291 K/UL — SIGNIFICANT CHANGE UP (ref 150–400)
POTASSIUM SERPL-MCNC: 4.2 MMOL/L — SIGNIFICANT CHANGE UP (ref 3.5–5.3)
POTASSIUM SERPL-SCNC: 4.2 MMOL/L — SIGNIFICANT CHANGE UP (ref 3.5–5.3)
PROT SERPL-MCNC: 7.2 G/DL — SIGNIFICANT CHANGE UP (ref 6–8.3)
PROT UR-MCNC: 100 MG/DL
PROTHROM AB SERPL-ACNC: 12.2 SEC — SIGNIFICANT CHANGE UP (ref 9.8–12.7)
RBC # BLD: 5.4 M/UL — SIGNIFICANT CHANGE UP (ref 4.2–5.8)
RBC # FLD: 13.5 % — SIGNIFICANT CHANGE UP (ref 10.3–14.5)
RBC CASTS # UR COMP ASSIST: >50 /HPF (ref 0–2)
RH IG SCN BLD-IMP: POSITIVE — SIGNIFICANT CHANGE UP
SODIUM SERPL-SCNC: 141 MMOL/L — SIGNIFICANT CHANGE UP (ref 135–145)
SP GR SPEC: 1.03 — HIGH (ref 1.01–1.02)
UROBILINOGEN FLD QL: NEGATIVE — SIGNIFICANT CHANGE UP
WBC # BLD: 9.4 K/UL — SIGNIFICANT CHANGE UP (ref 3.8–10.5)
WBC # FLD AUTO: 9.4 K/UL — SIGNIFICANT CHANGE UP (ref 3.8–10.5)
WBC UR QL: SIGNIFICANT CHANGE UP /HPF (ref 0–5)

## 2017-11-17 PROCEDURE — 99222 1ST HOSP IP/OBS MODERATE 55: CPT

## 2017-11-17 PROCEDURE — 99285 EMERGENCY DEPT VISIT HI MDM: CPT

## 2017-11-17 PROCEDURE — 76775 US EXAM ABDO BACK WALL LIM: CPT | Mod: 26

## 2017-11-17 PROCEDURE — 50432 PLMT NEPHROSTOMY CATHETER: CPT | Mod: LT

## 2017-11-17 RX ORDER — SODIUM CHLORIDE 9 MG/ML
1000 INJECTION INTRAMUSCULAR; INTRAVENOUS; SUBCUTANEOUS
Qty: 0 | Refills: 0 | Status: DISCONTINUED | OUTPATIENT
Start: 2017-11-17 | End: 2017-11-19

## 2017-11-17 RX ORDER — OXYCODONE AND ACETAMINOPHEN 5; 325 MG/1; MG/1
2 TABLET ORAL EVERY 4 HOURS
Qty: 0 | Refills: 0 | Status: DISCONTINUED | OUTPATIENT
Start: 2017-11-17 | End: 2017-11-19

## 2017-11-17 RX ORDER — SENNA PLUS 8.6 MG/1
2 TABLET ORAL AT BEDTIME
Qty: 0 | Refills: 0 | Status: DISCONTINUED | OUTPATIENT
Start: 2017-11-17 | End: 2017-11-19

## 2017-11-17 RX ORDER — DOCUSATE SODIUM 100 MG
100 CAPSULE ORAL THREE TIMES A DAY
Qty: 0 | Refills: 0 | Status: DISCONTINUED | OUTPATIENT
Start: 2017-11-17 | End: 2017-11-19

## 2017-11-17 RX ORDER — MORPHINE SULFATE 50 MG/1
4 CAPSULE, EXTENDED RELEASE ORAL ONCE
Qty: 0 | Refills: 0 | Status: DISCONTINUED | OUTPATIENT
Start: 2017-11-17 | End: 2017-11-17

## 2017-11-17 RX ORDER — ONDANSETRON 8 MG/1
4 TABLET, FILM COATED ORAL ONCE
Qty: 0 | Refills: 0 | Status: COMPLETED | OUTPATIENT
Start: 2017-11-17 | End: 2017-11-17

## 2017-11-17 RX ORDER — SODIUM CHLORIDE 9 MG/ML
1000 INJECTION INTRAMUSCULAR; INTRAVENOUS; SUBCUTANEOUS ONCE
Qty: 0 | Refills: 0 | Status: COMPLETED | OUTPATIENT
Start: 2017-11-17 | End: 2017-11-17

## 2017-11-17 RX ORDER — ONDANSETRON 8 MG/1
4 TABLET, FILM COATED ORAL ONCE
Qty: 0 | Refills: 0 | Status: DISCONTINUED | OUTPATIENT
Start: 2017-11-17 | End: 2017-11-17

## 2017-11-17 RX ORDER — ONDANSETRON 8 MG/1
4 TABLET, FILM COATED ORAL EVERY 6 HOURS
Qty: 0 | Refills: 0 | Status: DISCONTINUED | OUTPATIENT
Start: 2017-11-17 | End: 2017-11-19

## 2017-11-17 RX ORDER — KETOROLAC TROMETHAMINE 30 MG/ML
15 SYRINGE (ML) INJECTION EVERY 6 HOURS
Qty: 0 | Refills: 0 | Status: DISCONTINUED | OUTPATIENT
Start: 2017-11-17 | End: 2017-11-19

## 2017-11-17 RX ORDER — CIPROFLOXACIN LACTATE 400MG/40ML
400 VIAL (ML) INTRAVENOUS ONCE
Qty: 0 | Refills: 0 | Status: DISCONTINUED | OUTPATIENT
Start: 2017-11-17 | End: 2017-11-19

## 2017-11-17 RX ORDER — HYDROMORPHONE HYDROCHLORIDE 2 MG/ML
0.5 INJECTION INTRAMUSCULAR; INTRAVENOUS; SUBCUTANEOUS ONCE
Qty: 0 | Refills: 0 | Status: DISCONTINUED | OUTPATIENT
Start: 2017-11-17 | End: 2017-11-17

## 2017-11-17 RX ORDER — HYDROMORPHONE HYDROCHLORIDE 2 MG/ML
1 INJECTION INTRAMUSCULAR; INTRAVENOUS; SUBCUTANEOUS EVERY 4 HOURS
Qty: 0 | Refills: 0 | Status: DISCONTINUED | OUTPATIENT
Start: 2017-11-17 | End: 2017-11-17

## 2017-11-17 RX ORDER — HYDROMORPHONE HYDROCHLORIDE 2 MG/ML
1 INJECTION INTRAMUSCULAR; INTRAVENOUS; SUBCUTANEOUS ONCE
Qty: 0 | Refills: 0 | Status: DISCONTINUED | OUTPATIENT
Start: 2017-11-17 | End: 2017-11-17

## 2017-11-17 RX ORDER — HYDROMORPHONE HYDROCHLORIDE 2 MG/ML
0.5 INJECTION INTRAMUSCULAR; INTRAVENOUS; SUBCUTANEOUS
Qty: 0 | Refills: 0 | Status: DISCONTINUED | OUTPATIENT
Start: 2017-11-17 | End: 2017-11-17

## 2017-11-17 RX ORDER — KETOROLAC TROMETHAMINE 30 MG/ML
15 SYRINGE (ML) INJECTION ONCE
Qty: 0 | Refills: 0 | Status: DISCONTINUED | OUTPATIENT
Start: 2017-11-17 | End: 2017-11-17

## 2017-11-17 RX ORDER — HYDROMORPHONE HYDROCHLORIDE 2 MG/ML
1 INJECTION INTRAMUSCULAR; INTRAVENOUS; SUBCUTANEOUS EVERY 4 HOURS
Qty: 0 | Refills: 0 | Status: DISCONTINUED | OUTPATIENT
Start: 2017-11-17 | End: 2017-11-19

## 2017-11-17 RX ADMIN — MORPHINE SULFATE 4 MILLIGRAM(S): 50 CAPSULE, EXTENDED RELEASE ORAL at 09:12

## 2017-11-17 RX ADMIN — HYDROMORPHONE HYDROCHLORIDE 1 MILLIGRAM(S): 2 INJECTION INTRAMUSCULAR; INTRAVENOUS; SUBCUTANEOUS at 20:51

## 2017-11-17 RX ADMIN — OXYCODONE AND ACETAMINOPHEN 2 TABLET(S): 5; 325 TABLET ORAL at 23:27

## 2017-11-17 RX ADMIN — HYDROMORPHONE HYDROCHLORIDE 1 MILLIGRAM(S): 2 INJECTION INTRAMUSCULAR; INTRAVENOUS; SUBCUTANEOUS at 15:32

## 2017-11-17 RX ADMIN — HYDROMORPHONE HYDROCHLORIDE 1 MILLIGRAM(S): 2 INJECTION INTRAMUSCULAR; INTRAVENOUS; SUBCUTANEOUS at 21:20

## 2017-11-17 RX ADMIN — HYDROMORPHONE HYDROCHLORIDE 1 MILLIGRAM(S): 2 INJECTION INTRAMUSCULAR; INTRAVENOUS; SUBCUTANEOUS at 12:53

## 2017-11-17 RX ADMIN — HYDROMORPHONE HYDROCHLORIDE 0.5 MILLIGRAM(S): 2 INJECTION INTRAMUSCULAR; INTRAVENOUS; SUBCUTANEOUS at 15:32

## 2017-11-17 RX ADMIN — Medication 15 MILLIGRAM(S): at 09:21

## 2017-11-17 RX ADMIN — SODIUM CHLORIDE 3000 MILLILITER(S): 9 INJECTION INTRAMUSCULAR; INTRAVENOUS; SUBCUTANEOUS at 09:12

## 2017-11-17 RX ADMIN — SODIUM CHLORIDE 60 MILLILITER(S): 9 INJECTION INTRAMUSCULAR; INTRAVENOUS; SUBCUTANEOUS at 16:24

## 2017-11-17 RX ADMIN — Medication 100 MILLIGRAM(S): at 20:52

## 2017-11-17 RX ADMIN — HYDROMORPHONE HYDROCHLORIDE 1 MILLIGRAM(S): 2 INJECTION INTRAMUSCULAR; INTRAVENOUS; SUBCUTANEOUS at 12:33

## 2017-11-17 RX ADMIN — Medication 100 MILLIGRAM(S): at 16:27

## 2017-11-17 RX ADMIN — HYDROMORPHONE HYDROCHLORIDE 0.5 MILLIGRAM(S): 2 INJECTION INTRAMUSCULAR; INTRAVENOUS; SUBCUTANEOUS at 19:20

## 2017-11-17 RX ADMIN — HYDROMORPHONE HYDROCHLORIDE 0.5 MILLIGRAM(S): 2 INJECTION INTRAMUSCULAR; INTRAVENOUS; SUBCUTANEOUS at 11:35

## 2017-11-17 RX ADMIN — ONDANSETRON 4 MILLIGRAM(S): 8 TABLET, FILM COATED ORAL at 09:12

## 2017-11-17 RX ADMIN — MORPHINE SULFATE 4 MILLIGRAM(S): 50 CAPSULE, EXTENDED RELEASE ORAL at 09:38

## 2017-11-17 RX ADMIN — HYDROMORPHONE HYDROCHLORIDE 0.5 MILLIGRAM(S): 2 INJECTION INTRAMUSCULAR; INTRAVENOUS; SUBCUTANEOUS at 12:05

## 2017-11-17 RX ADMIN — HYDROMORPHONE HYDROCHLORIDE 0.5 MILLIGRAM(S): 2 INJECTION INTRAMUSCULAR; INTRAVENOUS; SUBCUTANEOUS at 16:01

## 2017-11-17 RX ADMIN — ONDANSETRON 4 MILLIGRAM(S): 8 TABLET, FILM COATED ORAL at 15:18

## 2017-11-17 RX ADMIN — HYDROMORPHONE HYDROCHLORIDE 1 MILLIGRAM(S): 2 INJECTION INTRAMUSCULAR; INTRAVENOUS; SUBCUTANEOUS at 15:18

## 2017-11-17 NOTE — ED PROVIDER NOTE - MEDICAL DECISION MAKING DETAILS
22M w/ extensive  history, b/l pyeloplasty p/w left flank pain, nausea/vomiting.  Will obtain labs, renal US, provide analgesia and consult urology.  Pt will likely require admission.  - Davina Johnson DO 22M w/ extensive  history, b/l pyeloplasty p/w left flank pain, nausea/vomiting.  Will obtain labs, renal US, provide analgesia and consult urology.  Pt will likely require admission.  - DO CLEMENTINE Diamond MD: 23 y/o male with PMH bilateral hydronephrosis, s/p pyeloplasty bilateral (Left 12/2016, Right 05/2017) w/ a doctor in [facility], recent CDU stay last week for presumed renal colic 2/2 stone (seen by Dr. Montes-Urology), known remaining kidney stones who p/w presenting with severe left flank pain starting yesterday, now b/l, waking him from sleep at 4am associated with nausea/vomiting.  Denies fever/chills, dysuria, chest pain, shortness of breath, hematuria, trauma.  Pt states he has had too many CT scans recently., prefers starting with US.     DDx: ureterolithiasis, hydronephrosis 2/2 stricture / structural abnormality, infection  Plan: renal US, labs, u/a, ucx, Urology consult

## 2017-11-17 NOTE — H&P ADULT - ASSESSMENT
21 yo m with bl mod hydronephrosis due to bl pyeloplastys in 2016/2017. Renal colic on left   - npo  type and screen and coags   spoke with ir regarding nt placement.   pain cont 21 yo m with bl mod hydronephrosis due to bl pyeloplastys in 2016/2017. Renal colic on left   - npo  type and screen and coags   Needs nephrostomy tube placement to relieve symptoms, persistent obstruction of left kidney.   pain cont

## 2017-11-17 NOTE — H&P ADULT - HISTORY OF PRESENT ILLNESS
22yMale with sig PMH  hx of bl UPJ obstruction - left repaired Dec 2016 and right May 2017- by [facility] urology [facility]. Has been managed by Dr. Adria Montes while in [facility] for nephrolithiasis requiring stenting earlier this yr. In Aug 2017 had lasix renal scan to assess for obstruction by primary urologist he thought "the left may have some scar tissue Pt does not know percentages. Was supposed to fu with MRI.  Since UPJ repairs, pt feels flank pains cate when he drinks a lot. Pain worse on the left. He self manages with Motrin and drinking smaller quantities freq.   Yesterday, he began having his left flank pain and so he limited po liquid intake, took a motrin and percocet without relief.  Pain did not go away so pt became concerned for stone therefore came to ED. Sono + bl hydro    Has been requiring po and iv options to control left flank pain.   No fevers/ chills/ sensation of retention/ dysuria/ hematuria.   Last stone passage April 2017.     PAST MEDICAL & SURGICAL HISTORY:  Ureteropelvic junction (UPJ) obstruction  Renal calculi  Acquired hydronephrosis with ureteropelvic junction (UPJ) obstruction: s/p left repair jan 2016

## 2017-11-17 NOTE — ED PROVIDER NOTE - OBJECTIVE STATEMENT
22M pmh bilateral hydornephrosis, s/p pyeloplasty bilaterally (Left 12/2016, Right 05/2017) w/ a doctor in [facility], recent CDU stay last week for presumed renal colic 2/2 stone.  Pt presenting with severe left flank pain starting yesterday acutely worsening and waking him from sleep at 4am associated with nausea/vomiting.  Denies fever/chills, dysuria, chest pain, shortness of breath.  Pt states he has had too many CT scans recently.  He has been following with Dr. Montes for urology.    - Davina Johnson DO

## 2017-11-17 NOTE — ED ADULT NURSE REASSESSMENT NOTE - NS ED NURSE REASSESS COMMENT FT1
Report received from April RN, VS repeated. Patient states his "pain returned" and he needs "something stronger than morphine." Alex CARROLL aware, pt rates pain 7/10

## 2017-11-17 NOTE — ED PROVIDER NOTE - PHYSICAL EXAMINATION
Gen: AOx3, very uncomfortable appearing  Head: NCAT  HEENT: PERRL, oral mucosa moist, normal conjunctiva  Lung: CTAB, no respiratory distress  CV: rrr, no murmurs, Normal perfusion  Abd: soft, nontender, diffusely tender, +left CVA tenderness  MSK: No edema, no visible deformities  Skin: No rash   - Davina Johnson, DO

## 2017-11-17 NOTE — ED ADULT NURSE NOTE - OBJECTIVE STATEMENT
21 yo m with pmhx of hydronephrosis with recent Kidney surgery pw r flank pain radiating to RLQ worsening over the last week. Pt was evaluated in ed and d/c from the observation unit last week.  VSS at this time. Afebrile. Tenderness noted in RLQ. Abd and bladder nondistended. Denies cp/sob, fevers/chills.   States he has had multiple episodes of vomiting over the past week. 21 yo m with pmhx of hydronephrosis with recent Kidney surgery pw L flank pain radiating to LLQ worsening over the last week. Pt was evaluated in ed and d/c from the observation unit last week.  VSS at this time. Afebrile. Tenderness noted in RLQ. Abd and bladder nondistended. Lungs cta. Denies cp/sob, fevers/chills.   States he has had multiple episodes of vomiting over the past week. 21 yo m with pmhx of UPJ causing hydronephrosis, kidney stones with recent surgery pw L flank pain radiating to LLQ worsening over the last week. Pt was evaluated in ed and d/c from the observation unit last week.  VSS at this time. Afebrile. Tenderness noted in RLQ. Abd and bladder nondistended. Lungs cta. Denies cp/sob, fevers/chills.   States he has had multiple episodes of vomiting over the past week.

## 2017-11-17 NOTE — PROGRESS NOTE ADULT - SUBJECTIVE AND OBJECTIVE BOX
VIR Procedure Note    Diagnosis: B/l hydronephrosis and left greater than right flank pain.  Procedure: Left PCN, clear urine.     Complications: None  Blood loss: 5cc  Sedation: Anesthesia    Plan: Recovery 1 hour    José Miguel Alston MD  VIR

## 2017-11-17 NOTE — PROGRESS NOTE ADULT - SUBJECTIVE AND OBJECTIVE BOX
Subjective: Patient seen and examined at bedside. no complaints. states left flank pressure is improving.  no chest pain, no palpitations    Objective:  Vital signs  T(F): , Max: 99 (11-17-17 @ 20:38)  HR: 77 (11-17-17 @ 22:35)  BP: 121/76 (11-17-17 @ 22:35)  SpO2: 96% (11-17-17 @ 22:35)      17 Nov 2017 07:01  -  17 Nov 2017 22:49  --------------------------------------------------------  IN:    sodium chloride 0.9%.: 120 mL  Total IN: 120 mL    OUT:  Total OUT: 0 mL    Total NET: 120 mL    Physical Exam:  Gen: no acute distress  Back: no CVAT b/l, L PCN in place, draining pink urine  Abd: soft nt/ nd    Labs:                        11.6   9.4   )-----------( 291      ( 17 Nov 2017 09:12 )             34.3     11-17    141  |  104  |  22  ----------------------------<  91  4.2   |  25  |  1.18    Ca    10.1      17 Nov 2017 09:12    TPro  7.2  /  Alb  4.8  /  TBili  0.4  /  DBili  x   /  AST  14  /  ALT  12  /  AlkPhos  52  11-17    PT/INR - ( 17 Nov 2017 12:58 )   PT: 12.2 sec;   INR: 1.13 ratio         PTT - ( 17 Nov 2017 12:58 )  PTT:33.2 sec

## 2017-11-17 NOTE — H&P ADULT - NSHPLABSRESULTS_GEN_ALL_CORE
11.6   9.4   )-----------( 291      ( 17 Nov 2017 09:12 )             34.3     11-17    141  |  104  |  22  ----------------------------<  91  4.2   |  25  |  1.18    Ca    10.1      17 Nov 2017 09:12    TPro  7.2  /  Alb  4.8  /  TBili  0.4  /  DBili  x   /  AST  14  /  ALT  12  /  AlkPhos  52  11-17    Urinalysis + Microscopic Examination (11.17.17 @ 11:42)    Glucose Qualitative, Urine: Negative    Blood, Urine: Moderate    Urine Appearance: Clear    Bilirubin: Negative    Color: Yellow    Urobilinogen: Negative    Specific Gravity: 1.029    Protein, Urine: 100 mg/dL    pH Urine: 6.0    Leukocyte Esterase Concentration: Trace    Nitrite: Negative    Ketone - Urine: Negative    Red Blood Cell - Urine: >50 /HPF    White Blood Cell - Urine: 11-25 /HPF    Calcium Oxalate Crystals: Few    < from: US Renal (11.17.17 @ 11:11) >    FINDINGS:    Right kidney:  12.7 cm. Moderate hydronephrosis. Redemonstrated 1 cm   right lower pole calculus.    Left kidney:  14.5 cm. Moderate hydronephrosis. 1.5 cm lower pole   calculus. There is mild echogenic debris in the renal calyces.    Urinary bladder: Within normal limits.    IMPRESSION:     Bilateral hydronephrosis and renal calculi, unchanged from prior CT.  Mild debris in a left renal calyx.      < end of copied text >

## 2017-11-17 NOTE — ED ADULT NURSE REASSESSMENT NOTE - NS ED NURSE REASSESS COMMENT FT1
Pt states pain is unchanged. Shilpa CARROLL made aware. As per MD "I want to come assess the patient before prescribing anything. I'll be over in a minute."

## 2017-11-18 ENCOUNTER — TRANSCRIPTION ENCOUNTER (OUTPATIENT)
Age: 22
End: 2017-11-18

## 2017-11-18 LAB
ANION GAP SERPL CALC-SCNC: 9 MMOL/L — SIGNIFICANT CHANGE UP (ref 5–17)
BUN SERPL-MCNC: 11 MG/DL — SIGNIFICANT CHANGE UP (ref 7–23)
CALCIUM SERPL-MCNC: 9.1 MG/DL — SIGNIFICANT CHANGE UP (ref 8.4–10.5)
CHLORIDE SERPL-SCNC: 105 MMOL/L — SIGNIFICANT CHANGE UP (ref 96–108)
CO2 SERPL-SCNC: 27 MMOL/L — SIGNIFICANT CHANGE UP (ref 22–31)
CREAT SERPL-MCNC: 0.96 MG/DL — SIGNIFICANT CHANGE UP (ref 0.5–1.3)
CULTURE RESULTS: NO GROWTH — SIGNIFICANT CHANGE UP
GLUCOSE SERPL-MCNC: 87 MG/DL — SIGNIFICANT CHANGE UP (ref 70–99)
HCT VFR BLD CALC: 33.5 % — LOW (ref 39–50)
HGB BLD-MCNC: 10.8 G/DL — LOW (ref 13–17)
MCHC RBC-ENTMCNC: 21 PG — LOW (ref 27–34)
MCHC RBC-ENTMCNC: 32.4 GM/DL — SIGNIFICANT CHANGE UP (ref 32–36)
MCV RBC AUTO: 64.9 FL — LOW (ref 80–100)
PLATELET # BLD AUTO: 264 K/UL — SIGNIFICANT CHANGE UP (ref 150–400)
POTASSIUM SERPL-MCNC: 4.8 MMOL/L — SIGNIFICANT CHANGE UP (ref 3.5–5.3)
POTASSIUM SERPL-SCNC: 4.8 MMOL/L — SIGNIFICANT CHANGE UP (ref 3.5–5.3)
RBC # BLD: 5.16 M/UL — SIGNIFICANT CHANGE UP (ref 4.2–5.8)
RBC # FLD: 14.1 % — SIGNIFICANT CHANGE UP (ref 10.3–14.5)
SODIUM SERPL-SCNC: 141 MMOL/L — SIGNIFICANT CHANGE UP (ref 135–145)
SPECIMEN SOURCE: SIGNIFICANT CHANGE UP
WBC # BLD: 8.4 K/UL — SIGNIFICANT CHANGE UP (ref 3.8–10.5)
WBC # FLD AUTO: 8.4 K/UL — SIGNIFICANT CHANGE UP (ref 3.8–10.5)

## 2017-11-18 PROCEDURE — 99232 SBSQ HOSP IP/OBS MODERATE 35: CPT

## 2017-11-18 RX ADMIN — OXYCODONE AND ACETAMINOPHEN 2 TABLET(S): 5; 325 TABLET ORAL at 23:51

## 2017-11-18 RX ADMIN — OXYCODONE AND ACETAMINOPHEN 2 TABLET(S): 5; 325 TABLET ORAL at 20:35

## 2017-11-18 RX ADMIN — OXYCODONE AND ACETAMINOPHEN 2 TABLET(S): 5; 325 TABLET ORAL at 05:30

## 2017-11-18 RX ADMIN — OXYCODONE AND ACETAMINOPHEN 2 TABLET(S): 5; 325 TABLET ORAL at 15:28

## 2017-11-18 RX ADMIN — OXYCODONE AND ACETAMINOPHEN 2 TABLET(S): 5; 325 TABLET ORAL at 15:58

## 2017-11-18 RX ADMIN — Medication 100 MILLIGRAM(S): at 05:28

## 2017-11-18 RX ADMIN — OXYCODONE AND ACETAMINOPHEN 2 TABLET(S): 5; 325 TABLET ORAL at 00:00

## 2017-11-18 RX ADMIN — OXYCODONE AND ACETAMINOPHEN 2 TABLET(S): 5; 325 TABLET ORAL at 06:00

## 2017-11-18 RX ADMIN — OXYCODONE AND ACETAMINOPHEN 2 TABLET(S): 5; 325 TABLET ORAL at 09:51

## 2017-11-18 RX ADMIN — Medication 100 MILLIGRAM(S): at 20:05

## 2017-11-18 RX ADMIN — Medication 100 MILLIGRAM(S): at 15:28

## 2017-11-18 RX ADMIN — OXYCODONE AND ACETAMINOPHEN 2 TABLET(S): 5; 325 TABLET ORAL at 20:05

## 2017-11-18 RX ADMIN — OXYCODONE AND ACETAMINOPHEN 2 TABLET(S): 5; 325 TABLET ORAL at 09:21

## 2017-11-18 NOTE — PROGRESS NOTE ADULT - ASSESSMENT
S/P L NT placement for L hydro renal colic  - f/u UCx  - pain control  - OOB  - regular diet  - DC planning

## 2017-11-18 NOTE — PROGRESS NOTE ADULT - SUBJECTIVE AND OBJECTIVE BOX
UROLOGY DAILY PROGRESS NOTE:     Subjective: Patient seen and examined at bedside. C/o some soreness at neph tube site, L flank       Objective:  Vital signs  T(F): , Max: 99 (11-17-17 @ 20:38)  HR: 61 (11-18-17 @ 05:21)  BP: 101/64 (11-18-17 @ 05:21)  SpO2: 98% (11-18-17 @ 05:21)  Wt(kg): --    I&O's Summary    17 Nov 2017 07:01  -  18 Nov 2017 06:32  --------------------------------------------------------  IN: 960 mL / OUT: 1150 mL / NET: -190 mL    I&O's Detail    17 Nov 2017 07:01  -  18 Nov 2017 06:33  --------------------------------------------------------  IN:    Oral Fluid: 240 mL    sodium chloride 0.9%.: 720 mL  Total IN: 960 mL    OUT:    Nephrostomy Tube: 550 mL    Voided: 600 mL  Total OUT: 1150 mL    Total NET: -190 mL          Gen: NAD  Pulm: No respiratory distress, no subcostal retractions  CV: RRR, no JVD  Abd: Soft, NT, ND  Back: + mild CVAT around tube, NT draining pink urine  :    Labs:  11-18  8.4   / 33.5  /0.96   11-17  9.4   / 34.3  /1.18                           10.8   8.4   )-----------( 264      ( 18 Nov 2017 05:44 )             33.5     11-18    141  |  105  |  11  ----------------------------<  87  4.8   |  27  |  0.96    Ca    9.1      18 Nov 2017 05:44    TPro  7.2  /  Alb  4.8  /  TBili  0.4  /  DBili  x   /  AST  14  /  ALT  12  /  AlkPhos  52  11-17    PT/INR - ( 17 Nov 2017 12:58 )   PT: 12.2 sec;   INR: 1.13 ratio         PTT - ( 17 Nov 2017 12:58 )  PTT:33.2 sec    Urine Cx: P

## 2017-11-18 NOTE — DISCHARGE NOTE ADULT - MEDICATION SUMMARY - MEDICATIONS TO TAKE
I will START or STAY ON the medications listed below when I get home from the hospital:    oxyCODONE-acetaminophen 5 mg-325 mg oral tablet  -- 1 tab(s) by mouth every 4 to 6 hours, As Needed MDD:8  -- Caution federal law prohibits the transfer of this drug to any person other  than the person for whom it was prescribed.  May cause drowsiness.  Alcohol may intensify this effect.  Use care when operating dangerous machinery.  This prescription cannot be refilled.  This product contains acetaminophen.  Do not use  with any other product containing acetaminophen to prevent possible liver damage.  Using more of this medication than prescribed may cause serious breathing problems.    -- Indication: For pain    Flomax 0.4 mg oral capsule  -- 1 cap(s) by mouth once a day  -- It is very important that you take or use this exactly as directed.  Do not skip doses or discontinue unless directed by your doctor.  May cause drowsiness.  Alcohol may intensify this effect.  Use care when operating dangerous machinery.  Some non-prescription drugs may aggravate your condition.  Read all labels carefully.  If a warning appears, check with your doctor before taking.  Swallow whole.  Do not crush.  Take with food or milk.    -- Indication: For urination    Zofran ODT 4 mg oral tablet, disintegrating  -- 1 tab(s) by mouth every 8 hours, As Needed -for nausea   -- Indication: For nausea    docusate sodium 100 mg oral capsule  -- 1 cap(s) by mouth 3 times a day  -- Indication: For stool softener    senna oral tablet  -- 2 tab(s) by mouth once a day (at bedtime), As needed, Constipation  -- Indication: For laxative    Pyridium 100 mg oral tablet  -- 2 tab(s) by mouth 3 times a day (after meals)  -- May discolor urine or feces.  Medication should be taken with plenty of water.  Take with food or milk.    -- Indication: For burning     Ditropan XL 5 mg/24 hours oral tablet, extended release  -- 1 tab(s) by mouth once a day  -- May cause drowsiness.  Alcohol may intensify this effect.  Use care when operating dangerous machinery.  Swallow whole.  Do not crush.    -- Indication: For spasms

## 2017-11-18 NOTE — DISCHARGE NOTE ADULT - PLAN OF CARE
relief of obstruction Please follow up with Dr. Montes in 1-2 weeks.  Call [phone] to schedule/confirm your appointment.  Call or follow up sooner with fevers, chills, nausea, vomiting, increasing pain, or with other concerns.

## 2017-11-18 NOTE — DISCHARGE NOTE ADULT - CARE PLAN
Principal Discharge DX:	Ureteropelvic junction (UPJ) obstruction  Goal:	relief of obstruction  Instructions for follow-up, activity and diet:	Please follow up with Dr. Montes in 1-2 weeks.  Call [phone] to schedule/confirm your appointment.  Call or follow up sooner with fevers, chills, nausea, vomiting, increasing pain, or with other concerns.

## 2017-11-18 NOTE — DISCHARGE NOTE ADULT - HOSPITAL COURSE
History of Present Illness: 	  22yMale with sig PMH  hx of bl UPJ obstruction - left repaired Dec 2016 and right May 2017- by [facility] urology [facility]. Has been managed by Dr. Adria Montes while in [facility] for nephrolithiasis requiring stenting earlier this yr. In Aug 2017 had lasix renal scan to assess for obstruction by primary urologist he thought "the left may have some scar tissue Pt does not know percentages. Was supposed to fu with MRI.  Since UPJ repairs, pt feels flank pains cate when he drinks a lot. Pain worse on the left. He self manages with Motrin and drinking smaller quantities freq.   Yesterday, he began having his left flank pain and so he limited po liquid intake, took a motrin and percocet without relief.  Pain did not go away so pt became concerned for stone therefore came to ED. Sono + bl hydro    Has been requiring po and iv options to control left flank pain.   No fevers/ chills/ sensation of retention/ dysuria/ hematuria.   Last stone passage April 2017.     He was admitted to urology service and IR placed a L NT.  Post op he was stable, his pain was controlled and he was afebrile. He was sent home with NT to bag drainage. He will follow up with Dr. Montes.

## 2017-11-18 NOTE — DISCHARGE NOTE ADULT - NS AS ACTIVITY OBS
Walking-Outdoors allowed/Showering allowed/Return to Work/School allowed/Stairs allowed/Walking-Indoors allowed

## 2017-11-18 NOTE — DISCHARGE NOTE ADULT - PATIENT PORTAL LINK FT
“You can access the [facility] Patient Portal, offered by [facility], by registering with the following website: [email or web address]”

## 2017-11-19 VITALS
DIASTOLIC BLOOD PRESSURE: 64 MMHG | SYSTOLIC BLOOD PRESSURE: 99 MMHG | RESPIRATION RATE: 16 BRPM | TEMPERATURE: 98 F | HEART RATE: 67 BPM | OXYGEN SATURATION: 99 %

## 2017-11-19 LAB
CULTURE RESULTS: SIGNIFICANT CHANGE UP
SPECIMEN SOURCE: SIGNIFICANT CHANGE UP

## 2017-11-19 PROCEDURE — 99285 EMERGENCY DEPT VISIT HI MDM: CPT | Mod: 25

## 2017-11-19 PROCEDURE — 82947 ASSAY GLUCOSE BLOOD QUANT: CPT

## 2017-11-19 PROCEDURE — C1894: CPT

## 2017-11-19 PROCEDURE — 83605 ASSAY OF LACTIC ACID: CPT

## 2017-11-19 PROCEDURE — 80048 BASIC METABOLIC PNL TOTAL CA: CPT

## 2017-11-19 PROCEDURE — 96376 TX/PRO/DX INJ SAME DRUG ADON: CPT

## 2017-11-19 PROCEDURE — 86850 RBC ANTIBODY SCREEN: CPT

## 2017-11-19 PROCEDURE — 86900 BLOOD TYPING SEROLOGIC ABO: CPT

## 2017-11-19 PROCEDURE — 87086 URINE CULTURE/COLONY COUNT: CPT

## 2017-11-19 PROCEDURE — C1729: CPT

## 2017-11-19 PROCEDURE — 84132 ASSAY OF SERUM POTASSIUM: CPT

## 2017-11-19 PROCEDURE — 80053 COMPREHEN METABOLIC PANEL: CPT

## 2017-11-19 PROCEDURE — 86901 BLOOD TYPING SEROLOGIC RH(D): CPT

## 2017-11-19 PROCEDURE — 50432 PLMT NEPHROSTOMY CATHETER: CPT

## 2017-11-19 PROCEDURE — 99232 SBSQ HOSP IP/OBS MODERATE 35: CPT

## 2017-11-19 PROCEDURE — 85730 THROMBOPLASTIN TIME PARTIAL: CPT

## 2017-11-19 PROCEDURE — 84295 ASSAY OF SERUM SODIUM: CPT

## 2017-11-19 PROCEDURE — 85014 HEMATOCRIT: CPT

## 2017-11-19 PROCEDURE — 76775 US EXAM ABDO BACK WALL LIM: CPT

## 2017-11-19 PROCEDURE — 81001 URINALYSIS AUTO W/SCOPE: CPT

## 2017-11-19 PROCEDURE — 85027 COMPLETE CBC AUTOMATED: CPT

## 2017-11-19 PROCEDURE — 82803 BLOOD GASES ANY COMBINATION: CPT

## 2017-11-19 PROCEDURE — C1769: CPT

## 2017-11-19 PROCEDURE — 85610 PROTHROMBIN TIME: CPT

## 2017-11-19 PROCEDURE — 82435 ASSAY OF BLOOD CHLORIDE: CPT

## 2017-11-19 PROCEDURE — 82330 ASSAY OF CALCIUM: CPT

## 2017-11-19 PROCEDURE — 96374 THER/PROPH/DIAG INJ IV PUSH: CPT

## 2017-11-19 PROCEDURE — 96375 TX/PRO/DX INJ NEW DRUG ADDON: CPT

## 2017-11-19 RX ADMIN — OXYCODONE AND ACETAMINOPHEN 2 TABLET(S): 5; 325 TABLET ORAL at 13:47

## 2017-11-19 RX ADMIN — OXYCODONE AND ACETAMINOPHEN 2 TABLET(S): 5; 325 TABLET ORAL at 00:21

## 2017-11-19 RX ADMIN — Medication 100 MILLIGRAM(S): at 05:18

## 2017-11-19 RX ADMIN — OXYCODONE AND ACETAMINOPHEN 2 TABLET(S): 5; 325 TABLET ORAL at 09:26

## 2017-11-19 RX ADMIN — OXYCODONE AND ACETAMINOPHEN 2 TABLET(S): 5; 325 TABLET ORAL at 14:17

## 2017-11-19 RX ADMIN — OXYCODONE AND ACETAMINOPHEN 2 TABLET(S): 5; 325 TABLET ORAL at 08:56

## 2017-11-19 RX ADMIN — Medication 100 MILLIGRAM(S): at 13:47

## 2017-11-19 NOTE — PROGRESS NOTE ADULT - ASSESSMENT
S/P L NT placement for L hydro renal colic  - f/u UCx  - pain control  - OOB  - regular diet  - VT home

## 2017-11-19 NOTE — PROGRESS NOTE ADULT - SUBJECTIVE AND OBJECTIVE BOX
UROLOGY DAILY PROGRESS NOTE:     Subjective: Patient seen and examined at bedside. No overnight events. C/o mild soreness at NT site.       Objective:  Vital signs  T(F): , Max: 98.8 (11-18-17 @ 21:22)  HR: 69 (11-19-17 @ 09:00)  BP: 111/65 (11-19-17 @ 09:00)  SpO2: 98% (11-19-17 @ 09:00)  Wt(kg): --    I&O's Summary    18 Nov 2017 07:01  -  19 Nov 2017 07:00  --------------------------------------------------------  IN: 2920 mL / OUT: 3275 mL / NET: -355 mL    19 Nov 2017 07:01  -  19 Nov 2017 10:14  --------------------------------------------------------  IN: 360 mL / OUT: 150 mL / NET: 210 mL        Gen: NAD  Pulm: No respiratory distress, no subcostal retractions  CV: RRR, no JVD  Abd: Soft, NT, ND  : mild L CVAT tenderness, dressing CDI, NT urine light pink	    Labs:  11-18  8.4   / 33.5  /0.96                           10.8   8.4   )-----------( 264      ( 18 Nov 2017 05:44 )             33.5     11-18    141  |  105  |  11  ----------------------------<  87  4.8   |  27  |  0.96    Ca    9.1      18 Nov 2017 05:44      PT/INR - ( 17 Nov 2017 12:58 )   PT: 12.2 sec;   INR: 1.13 ratio         PTT - ( 17 Nov 2017 12:58 )  PTT:33.2 sec    Urine Cx:  Culture - Urine (11.17.17 @ 23:10)    Specimen Source: .Urine Nephrostomy - Left    Culture Results:   No growth to date

## 2017-11-22 ENCOUNTER — APPOINTMENT (OUTPATIENT)
Dept: UROLOGY | Facility: CLINIC | Age: 22
End: 2017-11-22
Payer: MEDICAID

## 2017-11-22 PROCEDURE — 99214 OFFICE O/P EST MOD 30 MIN: CPT

## 2017-11-28 ENCOUNTER — CLINICAL ADVICE (OUTPATIENT)
Age: 22
End: 2017-11-28

## 2017-12-02 ENCOUNTER — EMERGENCY (EMERGENCY)
Facility: HOSPITAL | Age: 22
LOS: 1 days | Discharge: HOME | End: 2017-12-02
Attending: EMERGENCY MEDICINE | Admitting: EMERGENCY MEDICINE
Payer: MEDICAID

## 2017-12-02 VITALS
SYSTOLIC BLOOD PRESSURE: 125 MMHG | DIASTOLIC BLOOD PRESSURE: 67 MMHG | HEART RATE: 97 BPM | RESPIRATION RATE: 18 BRPM | OXYGEN SATURATION: 97 % | TEMPERATURE: 100 F

## 2017-12-02 VITALS — DIASTOLIC BLOOD PRESSURE: 88 MMHG | SYSTOLIC BLOOD PRESSURE: 138 MMHG | RESPIRATION RATE: 16 BRPM | HEART RATE: 78 BPM

## 2017-12-02 DIAGNOSIS — N13.0 HYDRONEPHROSIS WITH URETEROPELVIC JUNCTION OBSTRUCTION: Chronic | ICD-10-CM

## 2017-12-02 LAB
ALBUMIN SERPL ELPH-MCNC: 4.7 G/DL — SIGNIFICANT CHANGE UP (ref 3.3–5)
ALP SERPL-CCNC: 61 U/L — SIGNIFICANT CHANGE UP (ref 40–120)
ALT FLD-CCNC: 28 U/L RC — SIGNIFICANT CHANGE UP (ref 10–45)
ANION GAP SERPL CALC-SCNC: 14 MMOL/L — SIGNIFICANT CHANGE UP (ref 5–17)
APPEARANCE UR: ABNORMAL
APTT BLD: 37.8 SEC — HIGH (ref 27.5–37.4)
AST SERPL-CCNC: 17 U/L — SIGNIFICANT CHANGE UP (ref 10–40)
BACTERIA # UR AUTO: ABNORMAL /HPF
BASOPHILS # BLD AUTO: 0 K/UL — SIGNIFICANT CHANGE UP (ref 0–0.2)
BASOPHILS NFR BLD AUTO: 0.2 % — SIGNIFICANT CHANGE UP (ref 0–2)
BILIRUB SERPL-MCNC: 0.6 MG/DL — SIGNIFICANT CHANGE UP (ref 0.2–1.2)
BILIRUB UR-MCNC: NEGATIVE — SIGNIFICANT CHANGE UP
BUN SERPL-MCNC: 14 MG/DL — SIGNIFICANT CHANGE UP (ref 7–23)
CALCIUM SERPL-MCNC: 10.6 MG/DL — HIGH (ref 8.4–10.5)
CHLORIDE SERPL-SCNC: 101 MMOL/L — SIGNIFICANT CHANGE UP (ref 96–108)
CO2 SERPL-SCNC: 28 MMOL/L — SIGNIFICANT CHANGE UP (ref 22–31)
COLOR SPEC: YELLOW — SIGNIFICANT CHANGE UP
CREAT SERPL-MCNC: 1.05 MG/DL — SIGNIFICANT CHANGE UP (ref 0.5–1.3)
DIFF PNL FLD: ABNORMAL
EOSINOPHIL # BLD AUTO: 0.2 K/UL — SIGNIFICANT CHANGE UP (ref 0–0.5)
EOSINOPHIL NFR BLD AUTO: 2 % — SIGNIFICANT CHANGE UP (ref 0–6)
GLUCOSE SERPL-MCNC: 89 MG/DL — SIGNIFICANT CHANGE UP (ref 70–99)
GLUCOSE UR QL: NEGATIVE — SIGNIFICANT CHANGE UP
HCT VFR BLD CALC: 38.3 % — LOW (ref 39–50)
HGB BLD-MCNC: 13 G/DL — SIGNIFICANT CHANGE UP (ref 13–17)
INR BLD: 1.16 RATIO — SIGNIFICANT CHANGE UP (ref 0.88–1.16)
KETONES UR-MCNC: NEGATIVE — SIGNIFICANT CHANGE UP
LEUKOCYTE ESTERASE UR-ACNC: ABNORMAL
LYMPHOCYTES # BLD AUTO: 2.3 K/UL — SIGNIFICANT CHANGE UP (ref 1–3.3)
LYMPHOCYTES # BLD AUTO: 20 % — SIGNIFICANT CHANGE UP (ref 13–44)
MCHC RBC-ENTMCNC: 21.5 PG — LOW (ref 27–34)
MCHC RBC-ENTMCNC: 33.8 GM/DL — SIGNIFICANT CHANGE UP (ref 32–36)
MCV RBC AUTO: 63.7 FL — LOW (ref 80–100)
MONOCYTES # BLD AUTO: 0.7 K/UL — SIGNIFICANT CHANGE UP (ref 0–0.9)
MONOCYTES NFR BLD AUTO: 6.2 % — SIGNIFICANT CHANGE UP (ref 2–14)
NEUTROPHILS # BLD AUTO: 8.1 K/UL — HIGH (ref 1.8–7.4)
NEUTROPHILS NFR BLD AUTO: 71.6 % — SIGNIFICANT CHANGE UP (ref 43–77)
NITRITE UR-MCNC: NEGATIVE — SIGNIFICANT CHANGE UP
PH UR: 7.5 — SIGNIFICANT CHANGE UP (ref 5–8)
PLATELET # BLD AUTO: 314 K/UL — SIGNIFICANT CHANGE UP (ref 150–400)
POTASSIUM SERPL-MCNC: 4.9 MMOL/L — SIGNIFICANT CHANGE UP (ref 3.5–5.3)
POTASSIUM SERPL-SCNC: 4.9 MMOL/L — SIGNIFICANT CHANGE UP (ref 3.5–5.3)
PROT SERPL-MCNC: 8.4 G/DL — HIGH (ref 6–8.3)
PROT UR-MCNC: 100 MG/DL
PROTHROM AB SERPL-ACNC: 12.7 SEC — SIGNIFICANT CHANGE UP (ref 9.8–12.7)
RBC # BLD: 6.02 M/UL — HIGH (ref 4.2–5.8)
RBC # FLD: 13.4 % — SIGNIFICANT CHANGE UP (ref 10.3–14.5)
RBC CASTS # UR COMP ASSIST: ABNORMAL /HPF (ref 0–2)
SODIUM SERPL-SCNC: 143 MMOL/L — SIGNIFICANT CHANGE UP (ref 135–145)
SP GR SPEC: 1.01 — SIGNIFICANT CHANGE UP (ref 1.01–1.02)
UROBILINOGEN FLD QL: NEGATIVE — SIGNIFICANT CHANGE UP
WBC # BLD: 11.3 K/UL — HIGH (ref 3.8–10.5)
WBC # FLD AUTO: 11.3 K/UL — HIGH (ref 3.8–10.5)
WBC UR QL: SIGNIFICANT CHANGE UP /HPF (ref 0–5)

## 2017-12-02 PROCEDURE — 85730 THROMBOPLASTIN TIME PARTIAL: CPT

## 2017-12-02 PROCEDURE — 96374 THER/PROPH/DIAG INJ IV PUSH: CPT

## 2017-12-02 PROCEDURE — 87086 URINE CULTURE/COLONY COUNT: CPT

## 2017-12-02 PROCEDURE — 80053 COMPREHEN METABOLIC PANEL: CPT

## 2017-12-02 PROCEDURE — 96375 TX/PRO/DX INJ NEW DRUG ADDON: CPT

## 2017-12-02 PROCEDURE — 81001 URINALYSIS AUTO W/SCOPE: CPT

## 2017-12-02 PROCEDURE — 87186 SC STD MICRODIL/AGAR DIL: CPT

## 2017-12-02 PROCEDURE — 85610 PROTHROMBIN TIME: CPT

## 2017-12-02 PROCEDURE — 85027 COMPLETE CBC AUTOMATED: CPT

## 2017-12-02 PROCEDURE — 99284 EMERGENCY DEPT VISIT MOD MDM: CPT

## 2017-12-02 PROCEDURE — 99284 EMERGENCY DEPT VISIT MOD MDM: CPT | Mod: 25

## 2017-12-02 RX ORDER — ACETAMINOPHEN 500 MG
1000 TABLET ORAL ONCE
Qty: 0 | Refills: 0 | Status: COMPLETED | OUTPATIENT
Start: 2017-12-02 | End: 2017-12-02

## 2017-12-02 RX ORDER — KETOROLAC TROMETHAMINE 30 MG/ML
15 SYRINGE (ML) INJECTION ONCE
Qty: 0 | Refills: 0 | Status: DISCONTINUED | OUTPATIENT
Start: 2017-12-02 | End: 2017-12-02

## 2017-12-02 RX ORDER — HYDROMORPHONE HYDROCHLORIDE 2 MG/ML
1 INJECTION INTRAMUSCULAR; INTRAVENOUS; SUBCUTANEOUS ONCE
Qty: 0 | Refills: 0 | Status: DISCONTINUED | OUTPATIENT
Start: 2017-12-02 | End: 2017-12-02

## 2017-12-02 RX ORDER — SODIUM CHLORIDE 9 MG/ML
1000 INJECTION INTRAMUSCULAR; INTRAVENOUS; SUBCUTANEOUS ONCE
Qty: 0 | Refills: 0 | Status: COMPLETED | OUTPATIENT
Start: 2017-12-02 | End: 2017-12-02

## 2017-12-02 RX ADMIN — SODIUM CHLORIDE 1000 MILLILITER(S): 9 INJECTION INTRAMUSCULAR; INTRAVENOUS; SUBCUTANEOUS at 17:23

## 2017-12-02 RX ADMIN — HYDROMORPHONE HYDROCHLORIDE 1 MILLIGRAM(S): 2 INJECTION INTRAMUSCULAR; INTRAVENOUS; SUBCUTANEOUS at 18:30

## 2017-12-02 RX ADMIN — Medication 1000 MILLIGRAM(S): at 18:30

## 2017-12-02 RX ADMIN — HYDROMORPHONE HYDROCHLORIDE 1 MILLIGRAM(S): 2 INJECTION INTRAMUSCULAR; INTRAVENOUS; SUBCUTANEOUS at 17:22

## 2017-12-02 RX ADMIN — Medication 400 MILLIGRAM(S): at 17:23

## 2017-12-02 RX ADMIN — Medication 15 MILLIGRAM(S): at 18:45

## 2017-12-02 NOTE — ED ADULT NURSE NOTE - OBJECTIVE STATEMENT
Male 22 years old with severe hydronephrosis, s/p left nephrostomy tube Nov 18 came in with dark bloody urine in nephrostomy bag. Pt reports he's been having left flank pain radiating to his abdomen for a few days and he noticed the urine output in his nephrostomy bag is getting darker. With burning in urination. +left flank tenderness. Nephrostomy site clean and intact. No drainage noted to site. Denies fever and chills but with nausea and vomiting. labs obtained. Will monitor.

## 2017-12-02 NOTE — CONSULT NOTE ADULT - ASSESSMENT
22yoM w/ L PCN and L flank pain s/p pyeloplasty    -NT to drainage  -Pain control PRN, limit narcotics  -D/c home  -Follow up with Dr. Montes on Tuesday for nephrostogram

## 2017-12-02 NOTE — ED PROVIDER NOTE - MEDICAL DECISION MAKING DETAILS
23 y/o M with bilateral UPJ obstruction s/p pyeloplasty and L PCN presents with hematuria into L nephrostomy in addition to L flank pain. Concern for surgical complication vs renal colic and worsening hydro. Pain control, IVF, will discuss with urology regarding optimal imaging

## 2017-12-02 NOTE — ED PROVIDER NOTE - PROGRESS NOTE DETAILS
Lenin, PGY-3: Patient's nephrostomy flushes and withdraws easily. Hematuria cleared up s/p irrigation. Pain improved and tolerating PO. Cleared by radiology. Has follow up appointment in 3 days.

## 2017-12-02 NOTE — ED PROVIDER NOTE - OBJECTIVE STATEMENT
23 y/o M with h/o bilateral UPJ obstruction s/p pyeloplasty and nephrolithiasis presents with hematuria into L PCN and L flank pain +nausea/vomiting. S/p admission 2 weeks ago where L PCN was placed and urine was initially blood tinged but had cleared up. 3 days ago began to have gradually worsening hematuria now with dark red blood. Urine continues to drain into bag. No fever/chills. Today began to have worsening flank pain which prompted presentation to ER, has not taken any pain medications at home. 23 y/o M with h/o bilateral UPJ obstruction s/p pyeloplasty and nephrolithiasis presents with hematuria into L PCN and L flank pain +nausea/vomiting. S/p admission 2 weeks ago where L PCN was placed and urine was initially blood tinged but had cleared up. 3 days ago began to have gradually worsening hematuria now with dark red blood. Urine continues to drain into bag. No fever/chills. Today began to have worsening flank pain which prompted presentation to ER, has not taken any pain medications at home.    Attendinyo male presents with left flank pain and hematuria from left nephrostomy tube.  Started today.  Pt always has pain to the left flank but today the pain was much more intense.  Pt tolerating PO fluids well.  No nausea or vomiting.  no fever.

## 2017-12-02 NOTE — ED ADULT NURSE REASSESSMENT NOTE - NS ED NURSE REASSESS COMMENT FT1
1830 Left nephrostomy tube flushed with saline, no resistance noted. Pt tolerated procedure well. Nephrostomy tube draining very light pink  urine. Seen by nephrologist.

## 2017-12-02 NOTE — CONSULT NOTE ADULT - SUBJECTIVE AND OBJECTIVE BOX
23 y/o M with h/o bilateral UPJ obstruction s/p pyeloplasty and nephrolithiasis presents with hematuria into L PCN and L flank pain +nausea/dry heaving. S/p admission 2 weeks ago where L PCN was placed and urine was initially blood tinged but had cleared up. 3 days ago began to have gradually worsening hematuria now with dark red blood. Urine continues to drain into bag. No fever/chills. Today began to have worsening flank pain which prompted presentation to ER, has not taken any pain medications at home.  Now comfortable after IV Tylenol, Dilaudid.      Pt has not followed up as directed after this recent surgery.  Has an appointment with Dr. Montes on Tuesday for nephrostogram.     PAST MEDICAL & SURGICAL HISTORY:  Ureteropelvic junction (UPJ) obstruction  Renal calculi  Acquired hydronephrosis with ureteropelvic junction (UPJ) obstruction: s/p left repair jan 2016    FAMILY HISTORY:  No pertinent family history in first degree relatives    Allergies  No Known Allergies    REVIEW OF SYSTEMS: Pertinent positives and negatives as stated in HPI, otherwise negative    Vital signs  T(C): 37.7, Max: 37.7 (12-02 @ 17:05)  HR: 97  BP: 125/67  SpO2: 97%    Physical Exam  Gen: NAD, appears comfortable  Pulm: No respiratory distress, no subcostal retractions  CV: RRR, no JVD  Abd: Soft, NT, ND  : L PCN in place draining dark red urine, no bleeding around NT.   MSK: No edema present    LABS:  12-02 @ 17:29  WBC 11.3  / Hct 38.3  / SCr 1.05     12-02  143  |  101  |  14  ----------------------------<  89  4.9   |  28  |  1.05    Ca    10.6<H>      02 Dec 2017 17:29    TPro  8.4<H>  /  Alb  4.7  /  TBili  0.6  /  DBili  x   /  AST  17  /  ALT  28  /  AlkPhos  61  12-02    PT/INR - ( 02 Dec 2017 17:29 )   PT: 12.7 sec;   INR: 1.16 ratio       PTT - ( 02 Dec 2017 17:29 )  PTT:37.8 sec    Urine Cx: p

## 2017-12-04 LAB
-  AMIKACIN: SIGNIFICANT CHANGE UP
-  AMIKACIN: SIGNIFICANT CHANGE UP
-  AZTREONAM: SIGNIFICANT CHANGE UP
-  AZTREONAM: SIGNIFICANT CHANGE UP
-  CEFEPIME: SIGNIFICANT CHANGE UP
-  CEFEPIME: SIGNIFICANT CHANGE UP
-  CEFTAZIDIME: SIGNIFICANT CHANGE UP
-  CEFTAZIDIME: SIGNIFICANT CHANGE UP
-  CIPROFLOXACIN: SIGNIFICANT CHANGE UP
-  CIPROFLOXACIN: SIGNIFICANT CHANGE UP
-  GENTAMICIN: SIGNIFICANT CHANGE UP
-  GENTAMICIN: SIGNIFICANT CHANGE UP
-  IMIPENEM: SIGNIFICANT CHANGE UP
-  IMIPENEM: SIGNIFICANT CHANGE UP
-  LEVOFLOXACIN: SIGNIFICANT CHANGE UP
-  LEVOFLOXACIN: SIGNIFICANT CHANGE UP
-  MEROPENEM: SIGNIFICANT CHANGE UP
-  MEROPENEM: SIGNIFICANT CHANGE UP
-  PIPERACILLIN/TAZOBACTAM: SIGNIFICANT CHANGE UP
-  PIPERACILLIN/TAZOBACTAM: SIGNIFICANT CHANGE UP
-  TOBRAMYCIN: SIGNIFICANT CHANGE UP
-  TOBRAMYCIN: SIGNIFICANT CHANGE UP
CULTURE RESULTS: SIGNIFICANT CHANGE UP
METHOD TYPE: SIGNIFICANT CHANGE UP
METHOD TYPE: SIGNIFICANT CHANGE UP
ORGANISM # SPEC MICROSCOPIC CNT: SIGNIFICANT CHANGE UP
SPECIMEN SOURCE: SIGNIFICANT CHANGE UP
SPECIMEN SOURCE: SIGNIFICANT CHANGE UP

## 2017-12-04 RX ORDER — MOXIFLOXACIN HYDROCHLORIDE TABLETS, 400 MG 400 MG/1
1 TABLET, FILM COATED ORAL
Qty: 14 | Refills: 0
Start: 2017-12-04 | End: 2017-12-11

## 2017-12-04 NOTE — PROGRESS NOTE ADULT - SUBJECTIVE AND OBJECTIVE BOX
Called patient to notify of positive urine culture: >100,000 Pseudomonas. Patient was prescribed Ciprofloxacin 500 MG BID x7 days. Patient will follow up with Dr. Montes 12/5 at office.

## 2017-12-04 NOTE — ED POST DISCHARGE NOTE - ADDITIONAL DOCUMENTATION
12/5/17 - Modified ucx results show Pseudomonas >100,000 with sensitivity to Cipro which patient is on, unchanged from above cx result. Will continue with plan as per above since case was already discussed with the patient's Urology team who stated they would follow-up. - Pedro Gaines PA-C 12/5/17 - Modified ucx results show Pseudomonas >100,000 and Coag negative Staph >100,000 with sensitivity to Cipro which patient is on, unchanged from above cx result. Will continue with plan as per above since case was already discussed with the patient's Urology team who stated they would follow-up. - Pedro Gaines PA-C

## 2017-12-04 NOTE — ED POST DISCHARGE NOTE - OTHER COMMUNICATION
Patient is closely followed by urology. Called and discussed with urology PA as urology team here has close relationship with patient. State patient is scheduled for nephrostogram tomorrow, and will call patient to discuss starting antibiotic treatment prior to procedure. Urology team states will contact patient. -Selin Badillo PA-C

## 2017-12-05 ENCOUNTER — APPOINTMENT (OUTPATIENT)
Dept: UROLOGY | Facility: CLINIC | Age: 22
End: 2017-12-05
Payer: MEDICAID

## 2017-12-05 ENCOUNTER — APPOINTMENT (OUTPATIENT)
Dept: UROLOGY | Facility: CLINIC | Age: 22
End: 2017-12-05

## 2017-12-05 ENCOUNTER — OUTPATIENT (OUTPATIENT)
Dept: OUTPATIENT SERVICES | Facility: HOSPITAL | Age: 22
LOS: 1 days | End: 2017-12-05
Payer: MEDICAID

## 2017-12-05 DIAGNOSIS — N13.5 CROSSING VESSEL AND STRICTURE OF URETER WITHOUT HYDRONEPHROSIS: ICD-10-CM

## 2017-12-05 DIAGNOSIS — N20.0 CALCULUS OF KIDNEY: ICD-10-CM

## 2017-12-05 DIAGNOSIS — N39.0 URINARY TRACT INFECTION, SITE NOT SPECIFIED: ICD-10-CM

## 2017-12-05 DIAGNOSIS — N13.0 HYDRONEPHROSIS WITH URETEROPELVIC JUNCTION OBSTRUCTION: Chronic | ICD-10-CM

## 2017-12-05 DIAGNOSIS — R35.0 FREQUENCY OF MICTURITION: ICD-10-CM

## 2017-12-05 DIAGNOSIS — N13.5 CROSSING VESSEL AND STRICTURE OF URETER W/OUT HYDRONEPHROSIS: ICD-10-CM

## 2017-12-05 PROCEDURE — 99214 OFFICE O/P EST MOD 30 MIN: CPT | Mod: 25

## 2017-12-05 PROCEDURE — 50430 NJX PX NFROSGRM &/URTRGRM: CPT

## 2017-12-08 PROBLEM — N13.5 URETEROPELVIC JUNCTION (UPJ) OBSTRUCTION, RIGHT: Status: RESOLVED | Noted: 2017-02-09 | Resolved: 2017-12-08

## 2017-12-14 ENCOUNTER — APPOINTMENT (OUTPATIENT)
Dept: UROLOGY | Facility: CLINIC | Age: 22
End: 2017-12-14
Payer: MEDICAID

## 2017-12-14 ENCOUNTER — LABORATORY RESULT (OUTPATIENT)
Age: 22
End: 2017-12-14

## 2017-12-14 DIAGNOSIS — Z87.440 PERSONAL HISTORY OF URINARY (TRACT) INFECTIONS: ICD-10-CM

## 2017-12-14 DIAGNOSIS — N13.5 CROSSING VESSEL AND STRICTURE OF URETER W/OUT HYDRONEPHROSIS: ICD-10-CM

## 2017-12-14 PROCEDURE — 99214 OFFICE O/P EST MOD 30 MIN: CPT

## 2017-12-15 LAB
ANION GAP SERPL CALC-SCNC: 12 MMOL/L
BASOPHILS # BLD AUTO: 0.07 K/UL
BASOPHILS NFR BLD AUTO: 0.9 %
BUN SERPL-MCNC: 14 MG/DL
CALCIUM SERPL-MCNC: 10.2 MG/DL
CHLORIDE SERPL-SCNC: 100 MMOL/L
CO2 SERPL-SCNC: 29 MMOL/L
CREAT SERPL-MCNC: 1.07 MG/DL
EOSINOPHIL # BLD AUTO: 0.36 K/UL
EOSINOPHIL NFR BLD AUTO: 4.5
GLUCOSE SERPL-MCNC: 81 MG/DL
HCT VFR BLD CALC: 37.3 %
HGB BLD-MCNC: 12 G/DL
LYMPHOCYTES # BLD AUTO: 1.87 K/UL
LYMPHOCYTES NFR BLD AUTO: 23.6 %
MAN DIFF?: NORMAL
MCHC RBC-ENTMCNC: 19.9 PG
MCHC RBC-ENTMCNC: 32.2 GM/DL
MCV RBC AUTO: 62 FL
MONOCYTES # BLD AUTO: 0.51 K/UL
MONOCYTES NFR BLD AUTO: 6.4 %
NEUTROPHILS # BLD AUTO: 4.89 K/UL
NEUTROPHILS NFR BLD AUTO: 61.8 %
PLATELET # BLD AUTO: 322 K/UL
POTASSIUM SERPL-SCNC: 5.3 MMOL/L
RBC # BLD: 6.02 M/UL
RBC # FLD: 15.4 %
SODIUM SERPL-SCNC: 141 MMOL/L
WBC # FLD AUTO: 7.92 K/UL

## 2018-01-24 ENCOUNTER — LABORATORY RESULT (OUTPATIENT)
Age: 23
End: 2018-01-24

## 2018-01-24 ENCOUNTER — OUTPATIENT (OUTPATIENT)
Dept: OUTPATIENT SERVICES | Facility: HOSPITAL | Age: 23
LOS: 1 days | End: 2018-01-24
Payer: MEDICAID

## 2018-01-24 ENCOUNTER — APPOINTMENT (OUTPATIENT)
Dept: INTERNAL MEDICINE | Facility: CLINIC | Age: 23
End: 2018-01-24
Payer: MEDICAID

## 2018-01-24 VITALS
OXYGEN SATURATION: 96 % | SYSTOLIC BLOOD PRESSURE: 115 MMHG | BODY MASS INDEX: 22.76 KG/M2 | DIASTOLIC BLOOD PRESSURE: 60 MMHG | HEIGHT: 67 IN | WEIGHT: 145 LBS | HEART RATE: 63 BPM

## 2018-01-24 DIAGNOSIS — Z78.9 OTHER SPECIFIED HEALTH STATUS: ICD-10-CM

## 2018-01-24 DIAGNOSIS — N13.0 HYDRONEPHROSIS WITH URETEROPELVIC JUNCTION OBSTRUCTION: Chronic | ICD-10-CM

## 2018-01-24 DIAGNOSIS — I10 ESSENTIAL (PRIMARY) HYPERTENSION: ICD-10-CM

## 2018-01-24 DIAGNOSIS — L65.9 NONSCARRING HAIR LOSS, UNSPECIFIED: ICD-10-CM

## 2018-01-24 DIAGNOSIS — Z11.3 ENCOUNTER FOR SCREENING FOR INFECTIONS WITH A PREDOMINANTLY SEXUAL MODE OF TRANSMISSION: ICD-10-CM

## 2018-01-24 DIAGNOSIS — Z82.49 FAMILY HISTORY OF ISCHEMIC HEART DISEASE AND OTHER DISEASES OF THE CIRCULATORY SYSTEM: ICD-10-CM

## 2018-01-24 DIAGNOSIS — D64.9 ANEMIA, UNSPECIFIED: ICD-10-CM

## 2018-01-24 DIAGNOSIS — Z83.49 FAMILY HISTORY OF OTHER ENDOCRINE, NUTRITIONAL AND METABOLIC DISEASES: ICD-10-CM

## 2018-01-24 PROCEDURE — 83550 IRON BINDING TEST: CPT

## 2018-01-24 PROCEDURE — 87389 HIV-1 AG W/HIV-1&-2 AB AG IA: CPT

## 2018-01-24 PROCEDURE — 90688 IIV4 VACCINE SPLT 0.5 ML IM: CPT

## 2018-01-24 PROCEDURE — 84443 ASSAY THYROID STIM HORMONE: CPT

## 2018-01-24 PROCEDURE — 99204 OFFICE O/P NEW MOD 45 MIN: CPT | Mod: GC

## 2018-01-24 PROCEDURE — 80053 COMPREHEN METABOLIC PANEL: CPT

## 2018-01-24 PROCEDURE — 85027 COMPLETE CBC AUTOMATED: CPT

## 2018-01-24 PROCEDURE — G0463: CPT

## 2018-01-24 PROCEDURE — G0008: CPT

## 2018-01-24 PROCEDURE — 82728 ASSAY OF FERRITIN: CPT

## 2018-01-24 RX ORDER — TRAMADOL HYDROCHLORIDE 50 MG/1
50 TABLET, COATED ORAL
Qty: 40 | Refills: 0 | Status: DISCONTINUED | COMMUNITY
Start: 2017-02-28 | End: 2018-01-24

## 2018-01-24 RX ORDER — OXYCODONE AND ACETAMINOPHEN 5; 325 MG/1; MG/1
5-325 TABLET ORAL
Qty: 42 | Refills: 0 | Status: DISCONTINUED | COMMUNITY
Start: 2017-11-18

## 2018-01-24 RX ORDER — OXYCODONE 5 MG/1
5 TABLET ORAL
Qty: 12 | Refills: 0 | Status: DISCONTINUED | COMMUNITY
Start: 2017-11-07

## 2018-01-24 RX ORDER — ONDANSETRON 4 MG/1
4 TABLET, ORALLY DISINTEGRATING ORAL
Qty: 9 | Refills: 0 | Status: DISCONTINUED | COMMUNITY
Start: 2017-11-07

## 2018-01-24 RX ORDER — CIPROFLOXACIN HYDROCHLORIDE 500 MG/1
500 TABLET, FILM COATED ORAL
Qty: 14 | Refills: 0 | Status: DISCONTINUED | COMMUNITY
Start: 2017-12-04

## 2018-01-24 RX ORDER — OXYBUTYNIN CHLORIDE 2.5 MG/1
TABLET ORAL
Refills: 0 | Status: DISCONTINUED | COMMUNITY
End: 2018-01-24

## 2018-01-25 PROBLEM — D64.9 ANEMIA: Status: ACTIVE | Noted: 2018-01-25

## 2018-01-25 PROBLEM — L65.9 HAIR LOSS: Status: ACTIVE | Noted: 2018-01-25

## 2018-01-25 PROBLEM — Z83.49 FAMILY HISTORY OF GRAVES' DISEASE: Status: ACTIVE | Noted: 2018-01-25

## 2018-01-25 PROBLEM — Z82.49 FAMILY HISTORY OF CORONARY ARTERY DISEASE: Status: ACTIVE | Noted: 2018-01-25

## 2018-01-25 LAB
ALBUMIN SERPL ELPH-MCNC: 5.1 G/DL — HIGH (ref 3.3–5)
ALP SERPL-CCNC: 67 U/L — SIGNIFICANT CHANGE UP (ref 40–120)
ALT FLD-CCNC: 14 U/L — SIGNIFICANT CHANGE UP (ref 10–45)
ANION GAP SERPL CALC-SCNC: 17 MMOL/L — SIGNIFICANT CHANGE UP (ref 5–17)
AST SERPL-CCNC: 15 U/L — SIGNIFICANT CHANGE UP (ref 10–40)
BILIRUB SERPL-MCNC: 0.4 MG/DL — SIGNIFICANT CHANGE UP (ref 0.2–1.2)
BUN SERPL-MCNC: 15 MG/DL — SIGNIFICANT CHANGE UP (ref 7–23)
C TRACH RRNA SPEC QL NAA+PROBE: SIGNIFICANT CHANGE UP
CALCIUM SERPL-MCNC: 10.8 MG/DL — HIGH (ref 8.4–10.5)
CHLORIDE SERPL-SCNC: 102 MMOL/L — SIGNIFICANT CHANGE UP (ref 96–108)
CO2 SERPL-SCNC: 25 MMOL/L — SIGNIFICANT CHANGE UP (ref 22–31)
CREAT SERPL-MCNC: 1.06 MG/DL — SIGNIFICANT CHANGE UP (ref 0.5–1.3)
FERRITIN SERPL-MCNC: 140 NG/ML — SIGNIFICANT CHANGE UP (ref 30–400)
GLUCOSE SERPL-MCNC: 86 MG/DL — SIGNIFICANT CHANGE UP (ref 70–99)
HCT VFR BLD CALC: 40 % — SIGNIFICANT CHANGE UP (ref 39–50)
HGB BLD-MCNC: 12.9 G/DL — LOW (ref 13–17)
HIV 1+2 AB+HIV1 P24 AG SERPL QL IA: SIGNIFICANT CHANGE UP
IRON SATN MFR SERPL: 20 % — SIGNIFICANT CHANGE UP (ref 16–55)
IRON SATN MFR SERPL: 65 UG/DL — SIGNIFICANT CHANGE UP (ref 45–165)
MCHC RBC-ENTMCNC: 19.9 PG — LOW (ref 27–34)
MCHC RBC-ENTMCNC: 32.3 GM/DL — SIGNIFICANT CHANGE UP (ref 32–36)
MCV RBC AUTO: 61.8 FL — LOW (ref 80–100)
N GONORRHOEA RRNA SPEC QL NAA+PROBE: SIGNIFICANT CHANGE UP
PLATELET # BLD AUTO: 286 K/UL — SIGNIFICANT CHANGE UP (ref 150–400)
POTASSIUM SERPL-MCNC: 5.5 MMOL/L — HIGH (ref 3.5–5.3)
POTASSIUM SERPL-SCNC: 5.5 MMOL/L — HIGH (ref 3.5–5.3)
PROT SERPL-MCNC: 8.2 G/DL — SIGNIFICANT CHANGE UP (ref 6–8.3)
RBC # BLD: 6.47 M/UL — HIGH (ref 4.2–5.8)
RBC # FLD: 16 % — HIGH (ref 10.3–14.5)
SODIUM SERPL-SCNC: 144 MMOL/L — SIGNIFICANT CHANGE UP (ref 135–145)
SPECIMEN SOURCE: SIGNIFICANT CHANGE UP
T4 FREE+ TSH PNL SERPL: 3.38 UIU/ML — SIGNIFICANT CHANGE UP (ref 0.27–4.2)
TIBC SERPL-MCNC: 328 UG/DL — SIGNIFICANT CHANGE UP (ref 220–430)
UIBC SERPL-MCNC: 263 UG/DL — SIGNIFICANT CHANGE UP (ref 110–370)
WBC # BLD: 7.15 K/UL — SIGNIFICANT CHANGE UP (ref 3.8–10.5)
WBC # FLD AUTO: 7.15 K/UL — SIGNIFICANT CHANGE UP (ref 3.8–10.5)

## 2018-01-30 PROBLEM — Z78.9 NON-SMOKER: Noted: 2018-01-25

## 2018-01-30 PROBLEM — Z11.3 SCREENING FOR STD (SEXUALLY TRANSMITTED DISEASE): Status: RESOLVED | Noted: 2018-01-24 | Resolved: 2018-01-30

## 2018-01-30 NOTE — ASSESSMENT
[FreeTextEntry1] : The patient is a 22 Y.O male with pmh of stones, congenital obstruction casing b/l hydro nephrostomy, who presents for an initial visit. \par \par # B/L hydro with stones s/p pyeloplasty. \par Hx recurrent ca-phos dominant stone, \par referral to renal for possible preventive treatment for stones and chronic hydro, possible 24 urine collection. \par check baseline renal function. \par \par # Hair thinning\par benign exam, no sex dysfunction, no s/s of thyroidal illness, \par will check tsh. \par \par # Low MCV anemia\par - likely 2/2 to multiple  surgeries, will check cbc, iron panel and ferritin\par \par # HCM \par - GC/CL, hiv. Flu shot today.

## 2018-01-30 NOTE — REVIEW OF SYSTEMS
[Abdominal Pain] : abdominal pain [see HPI] : see HPI [Negative] : Neurological [Anxiety] : anxiety [Fever] : no fever [Chills] : no chills [Earache] : no earache [Loss Of Hearing] : no hearing loss [Nosebleeds] : no nosebleeds [Nasal Discharge] : no nasal discharge [Chest Pain] : no chest pain [Palpitations] : no palpitations [Shortness Of Breath] : no shortness of breath [Cough] : no cough [Wheezing] : no wheezing [SOB on Exertion] : no shortness of breath during exertion [Arthralgias] : no arthralgias [Joint Pain] : no joint pain [Joint Swelling] : no joint swelling [Joint Stiffness] : no joint stiffness [Skin Lesions] : no skin lesions [Skin Wound] : no skin wound [Itching] : no itching [Change In A Mole] : no change in a mole [Confused] : no confusion [Convulsions] : no convulsions [Dizziness] : no dizziness [Fainting] : no fainting [Suicidal] : not suicidal [Sleep Disturbances] : no sleep disturbances [Depression] : no depression

## 2018-01-30 NOTE — PHYSICAL EXAM
[General Appearance - Alert] : alert [General Appearance - In No Acute Distress] : in no acute distress [General Appearance - Well Nourished] : well nourished [General Appearance - Well Developed] : well developed [PERRL With Normal Accommodation] : pupils were equal in size, round, and reactive to light [Extraocular Movements] : extraocular movements were intact [Strabismus] : no strabismus was seen [Outer Ear] : the ears and nose were normal in appearance [Hearing Threshold Finger Rub Not Cobb] : hearing was normal [Examination Of The Oral Cavity] : the lips and gums were normal [Both Tympanic Membranes Were Examined] : both tympanic membranes were normal [Nasal Cavity] : the nasal mucosa and septum were normal [Oropharynx] : the oropharynx was normal [Neck Cervical Mass (___cm)] : no neck mass was observed [Jugular Venous Distention Increased] : there was no jugular-venous distention [Respiration, Rhythm And Depth] : normal respiratory rhythm and effort [Exaggerated Use Of Accessory Muscles For Inspiration] : no accessory muscle use [Auscultation Breath Sounds / Voice Sounds] : lungs were clear to auscultation bilaterally [Heart Rate And Rhythm] : heart rate was normal and rhythm regular [Heart Sounds] : normal S1 and S2 [Heart Sounds Gallop] : no gallops [Murmurs] : no murmurs [Heart Sounds Pericardial Friction Rub] : no pericardial rub [Edema] : there was no peripheral edema [Veins - Varicosity Changes] : there were no varicosital changes [Bowel Sounds] : normal bowel sounds [Abdomen Soft] : soft [Abdomen Tenderness] : non-tender [] : no hepato-splenomegaly [Abdomen Mass (___ Cm)] : no abdominal mass palpated [Abdomen Hernia] : no hernia was discovered [Cervical Lymph Nodes Enlarged Posterior Bilaterally] : posterior cervical [Cervical Lymph Nodes Enlarged Anterior Bilaterally] : anterior cervical [Supraclavicular Lymph Nodes Enlarged Bilaterally] : supraclavicular [No CVA Tenderness] : no ~M costovertebral angle tenderness [Abnormal Walk] : normal gait [Nail Clubbing] : no clubbing  or cyanosis of the fingernails [Musculoskeletal - Swelling] : no joint swelling seen [Motor Tone] : muscle strength and tone were normal [Cranial Nerves] : cranial nerves 2-12 were intact [Deep Tendon Reflexes (DTR)] : deep tendon reflexes were 2+ and symmetric [Sensation] : the sensory exam was normal to light touch and pinprick [Motor Exam] : the motor exam was normal [Oriented To Time, Place, And Person] : oriented to person, place, and time [Affect] : the affect was normal [Mood] : the mood was normal [FreeTextEntry1] : old left nephrostomy site d/c/i, negative hair pull test, no obvious loss of hair, scalp intact, no rash, no lesions.  [Over the Past 2 Weeks, Have You Felt Down, Depressed, or Hopeless?] : 1.) Over the past 2 weeks, have you felt down, depressed, or hopeless? No [Over the Past 2 Weeks, Have You Felt Little Interest or Pleasure Doing Things?] : 2.) Over the past 2 weeks, have you felt little interest or pleasure doing things? No

## 2018-01-30 NOTE — HISTORY OF PRESENT ILLNESS
[FreeTextEntry1] : The patient is a 22 Y.O male with pmh of stones, congenital obstruction casing b/l hydro nephrostomy, who presents for an initial visit. \par \par # B/L hydro with stones s/p pyeloplasty. \par Hx of stones in the past, recently complicated  history including b/l hydro s/p pyeloplasty, recurrent stones ca-phos prominent on stone analysis. Posted in HIE. Recent R nephrotomy tube s/p removal for worsening hydro. Follows up with urology. Has been having chronic pain as it requires increased pressure in the kidney to void normally. \par \par # Hair thinning\par Normal hair distribution, states noticed some thinning of the hair follicles over the last several month in the hair. No patch of hair loss. Father has male pattern baldness. No other s/s of thyroidal disease, no reported sexual dysfunction. \par \par PMH- Anemia, stone,s b/l hydro\par Surgery - septoplasty, pyeloplasty, nephrostomy tubes\par FH- Grave disease- mother, Cad -father\par SH - lives with , School- [employer], no ETOH, no smoking. \par Sex active, no protection\par Up to date on immunization, declines flu shot.

## 2018-01-31 ENCOUNTER — MED ADMIN CHARGE (OUTPATIENT)
Age: 23
End: 2018-01-31

## 2018-01-31 DIAGNOSIS — Z78.9 OTHER SPECIFIED HEALTH STATUS: ICD-10-CM

## 2018-01-31 DIAGNOSIS — L65.9 NONSCARRING HAIR LOSS, UNSPECIFIED: ICD-10-CM

## 2018-01-31 DIAGNOSIS — N20.2 CALCULUS OF KIDNEY WITH CALCULUS OF URETER: ICD-10-CM

## 2018-01-31 DIAGNOSIS — Z82.49 FAMILY HISTORY OF ISCHEMIC HEART DISEASE AND OTHER DISEASES OF THE CIRCULATORY SYSTEM: ICD-10-CM

## 2018-01-31 DIAGNOSIS — Z83.49 FAMILY HISTORY OF OTHER ENDOCRINE, NUTRITIONAL AND METABOLIC DISEASES: ICD-10-CM

## 2018-01-31 DIAGNOSIS — Z11.3 ENCOUNTER FOR SCREENING FOR INFECTIONS WITH A PREDOMINANTLY SEXUAL MODE OF TRANSMISSION: ICD-10-CM

## 2018-01-31 DIAGNOSIS — Z23 ENCOUNTER FOR IMMUNIZATION: ICD-10-CM

## 2018-01-31 DIAGNOSIS — N13.30 UNSPECIFIED HYDRONEPHROSIS: ICD-10-CM

## 2018-01-31 DIAGNOSIS — D64.9 ANEMIA, UNSPECIFIED: ICD-10-CM

## 2018-04-12 RX ORDER — MELOXICAM 15 MG/1
15 TABLET ORAL DAILY
Qty: 7 | Refills: 0 | Status: ACTIVE | COMMUNITY
Start: 2018-04-12 | End: 1900-01-01

## 2018-04-12 RX ORDER — OXYCODONE AND ACETAMINOPHEN 5; 325 MG/1; MG/1
5-325 TABLET ORAL
Qty: 5 | Refills: 0 | Status: ACTIVE | COMMUNITY
Start: 2018-04-12 | End: 1900-01-01

## 2018-04-18 ENCOUNTER — APPOINTMENT (OUTPATIENT)
Dept: INTERNAL MEDICINE | Facility: CLINIC | Age: 23
End: 2018-04-18
Payer: MEDICAID

## 2018-04-26 ENCOUNTER — LABORATORY RESULT (OUTPATIENT)
Age: 23
End: 2018-04-26

## 2018-04-26 ENCOUNTER — APPOINTMENT (OUTPATIENT)
Dept: INTERNAL MEDICINE | Facility: CLINIC | Age: 23
End: 2018-04-26
Payer: MEDICAID

## 2018-04-26 ENCOUNTER — OUTPATIENT (OUTPATIENT)
Dept: OUTPATIENT SERVICES | Facility: HOSPITAL | Age: 23
LOS: 1 days | End: 2018-04-26
Payer: MEDICAID

## 2018-04-26 VITALS
SYSTOLIC BLOOD PRESSURE: 112 MMHG | OXYGEN SATURATION: 97 % | HEART RATE: 78 BPM | WEIGHT: 144 LBS | BODY MASS INDEX: 22.6 KG/M2 | HEIGHT: 67 IN | DIASTOLIC BLOOD PRESSURE: 70 MMHG

## 2018-04-26 DIAGNOSIS — R53.83 OTHER FATIGUE: ICD-10-CM

## 2018-04-26 DIAGNOSIS — N13.0 HYDRONEPHROSIS WITH URETEROPELVIC JUNCTION OBSTRUCTION: Chronic | ICD-10-CM

## 2018-04-26 DIAGNOSIS — I10 ESSENTIAL (PRIMARY) HYPERTENSION: ICD-10-CM

## 2018-04-26 DIAGNOSIS — F32.0 MAJOR DEPRESSIVE DISORDER, SINGLE EPISODE, MILD: ICD-10-CM

## 2018-04-26 PROCEDURE — G0463: CPT

## 2018-04-26 PROCEDURE — 99213 OFFICE O/P EST LOW 20 MIN: CPT | Mod: GE

## 2018-04-26 RX ORDER — OXYCODONE AND ACETAMINOPHEN 5; 325 MG/1; MG/1
5-325 TABLET ORAL
Qty: 15 | Refills: 0 | Status: DISCONTINUED | COMMUNITY
Start: 2018-01-31 | End: 2018-04-26

## 2018-04-26 RX ORDER — MELOXICAM 15 MG/1
15 TABLET ORAL DAILY
Qty: 30 | Refills: 0 | Status: DISCONTINUED | COMMUNITY
Start: 2018-01-31 | End: 2018-04-26

## 2018-04-27 LAB
25(OH)D3 SERPL-MCNC: 33 NG/ML
ALBUMIN SERPL ELPH-MCNC: 4.8 G/DL
ALP BLD-CCNC: 60 U/L
ALT SERPL-CCNC: 14 U/L
ANION GAP SERPL CALC-SCNC: 12 MMOL/L
AST SERPL-CCNC: 21 U/L
BASOPHILS # BLD AUTO: 0.21 K/UL
BASOPHILS NFR BLD AUTO: 2.7 %
BILIRUB SERPL-MCNC: 0.4 MG/DL
BUN SERPL-MCNC: 16 MG/DL
CALCIUM SERPL-MCNC: 10 MG/DL
CHLORIDE SERPL-SCNC: 103 MMOL/L
CO2 SERPL-SCNC: 26 MMOL/L
CREAT SERPL-MCNC: 1.03 MG/DL
EOSINOPHIL # BLD AUTO: 0.54 K/UL
EOSINOPHIL NFR BLD AUTO: 7.1 %
FOLATE SERPL-MCNC: 14.9 NG/ML
GLUCOSE SERPL-MCNC: 83 MG/DL
HCT VFR BLD CALC: 39.3 %
HGB BLD-MCNC: 12.4 G/DL
LYMPHOCYTES # BLD AUTO: 2.12 K/UL
LYMPHOCYTES NFR BLD AUTO: 27.7 %
MAN DIFF?: NORMAL
MCHC RBC-ENTMCNC: 20 PG
MCHC RBC-ENTMCNC: 31.6 GM/DL
MCV RBC AUTO: 63.4 FL
MONOCYTES # BLD AUTO: 0.34 K/UL
MONOCYTES NFR BLD AUTO: 4.5 %
NEUTROPHILS # BLD AUTO: 3.96 K/UL
NEUTROPHILS NFR BLD AUTO: 51.8 %
PLATELET # BLD AUTO: 283 K/UL
POTASSIUM SERPL-SCNC: 5.1 MMOL/L
PROT SERPL-MCNC: 7.6 G/DL
RBC # BLD: 6.2 M/UL
RBC # FLD: 15.1 %
SODIUM SERPL-SCNC: 141 MMOL/L
TSH SERPL-ACNC: 3.99 UIU/ML
VIT B12 SERPL-MCNC: 845 PG/ML
WBC # FLD AUTO: 7.64 K/UL

## 2018-05-03 DIAGNOSIS — R53.83 OTHER FATIGUE: ICD-10-CM

## 2018-05-03 DIAGNOSIS — F32.0 MAJOR DEPRESSIVE DISORDER, SINGLE EPISODE, MILD: ICD-10-CM

## 2019-02-28 NOTE — ED ADULT NURSE REASSESSMENT NOTE - TEMPLATE LIST FOR HEAD TO TOE ASSESSMENT
Symptoms
 Symptoms
Patient: Isis Mallory  MRN: [id number]  : 1956    Referring  Physician   Tino PARKER MD  [address] [place]   [facility] [address]  [phone]      Chief Complaint   Patient presents with   • Follow-up       HPI :  The patient returns today for a follow up visit.  He is doing well, has no complaints of exertional chest pain or dyspnea. He has no limitations to his activities, walks at least 20 minutes a day .  He also follows stricter diet and has been gradually losing weight.  He is compliant with medications. Blood pressure has been stable.  There has been no change in his general health.         Patient Active Problem List   Diagnosis   • Arteriosclerotic coronary artery disease   • Ischemic cardiomyopathy   • Dyslipidemia, goal LDL below 70   • HTN (hypertension)       Past Medical History:   Diagnosis Date   • Fatty liver    • Gynecomastia    • History of anterior wall myocardial infarction      Past Surgical History:   Procedure Laterality Date   • Ptca       No family history on file.  ALLERGIES:   Allergen Reactions   • Amoxicillin Other (See Comments)     Unknown     Current Outpatient Medications   Medication Sig Dispense Refill   • aspirin 81 MG tablet Take by mouth daily.     • Cholecalciferol (VITAMIN D3) 2000 units capsule      • metformin (GLUCOPHAGE) 1000 MG tablet Take 1,000 mg by mouth 2 times daily.      • atorvastatin (LIPITOR) 20 MG tablet Take 20 mg by mouth daily. TAKE ONE TABLET BY MOUTH ONCE DAILY AT BEDTIME      • lisinopril (ZESTRIL) 5 MG tablet TAKE 1 TABLET DAILY     • metoPROLOL tartrate (LOPRESSOR) 25 MG tablet Take 1 tablet twice a day       No current facility-administered medications for this visit.      Social History     Tobacco Use   • Smoking status: Former Smoker   • Smokeless tobacco: Former User   Substance Use Topics   • Alcohol use: Not on file   • Drug use: Not on file       Review of System:  Review of Systems   Constitutional: Negative.    HENT: 
Negative.    Eyes: Negative.    Respiratory: Negative.    Cardiovascular: Negative.    Gastrointestinal: Negative.    Endocrine: Negative.    Genitourinary: Negative.    Musculoskeletal: Negative.    Skin: Negative.    Allergic/Immunologic: Negative.    Neurological: Negative.    Hematological: Negative.    Psychiatric/Behavioral: Negative.    All other systems reviewed and are negative.    Physical Examination:   Visit Vitals  /74 (Patient Position: Standing)   Pulse 70   Resp 16   Ht 5' 5\" (1.651 m)   Wt 83 kg (183 lb)   BMI 30.45 kg/m²     Physical Exam   Constitutional: He is oriented to person, place, and time. He appears well-developed and well-nourished.   HENT:   Head: Normocephalic.   Nose: Nose normal.   Mouth/Throat: Oropharynx is clear and moist.   Eyes: Conjunctivae and EOM are normal. Pupils are equal, round, and reactive to light.   Neck: Normal range of motion. Neck supple.   Cardiovascular: Normal rate, regular rhythm, S1 normal, S2 normal, normal heart sounds and intact distal pulses.   Pulses:       Carotid pulses are 2+ on the right side, and 2+ on the left side.       Radial pulses are 2+ on the right side, and 2+ on the left side.        Femoral pulses are 2+ on the right side, and 2+ on the left side.       Popliteal pulses are 2+ on the right side, and 2+ on the left side.        Dorsalis pedis pulses are 2+ on the right side, and 2+ on the left side.        Posterior tibial pulses are 2+ on the right side, and 2+ on the left side.   Pulmonary/Chest: Effort normal and breath sounds normal.   Abdominal: Soft. Bowel sounds are normal.   Musculoskeletal: Normal range of motion.   Neurological: He is alert and oriented to person, place, and time. He has normal reflexes.   Skin: Skin is warm and dry.   Psychiatric: He has a normal mood and affect. His behavior is normal. Judgment and thought content normal.   Nursing note and vitals reviewed.         Assessment and Plan:   Problem 
  Arteriosclerotic Coronary Artery Disease    Angiogram in 11/2013 -->  LCx with no obstructive disease, RCA with 60% proximal      stenosis which was not intervened upon, 100% proximal LAD occlusion S/P BMS to LAD       ( 3.5 by 12 mm ). NST was done in 06/2016 which showed fixed anterior wall perfusion      defect with EF of 47%.         Ischemic Cardiomyopathy    Echocardiographic evaluation was done in June 2015.  It showed anterior wall      hypokinesis, EF 40-45 percent . No change in 12/2017.BNP was checked in 12/2017-normal.  Off spironolactone due to gynecomastia.         Dyslipidemia, Goal Ldl Below 70   Htn (Hypertension)   Class 1 Obesity in Adult (Resolved)   Hyperkalemia (Resolved)       Ischemic cardiomyopathy  Patient's medical regimen is well adjusted, he remains functional class I.  CPM.    Arteriosclerotic coronary artery disease  -Continue strict control of CV risk factors.  -  Currently, patient remains asymptomatic, risk factors are very well controlled.  Will proceed with repeat ischemic workup as clinically indicated.  -Continue aspirin 81 mg p.o. daily    HTN (hypertension)  Hypertension is well controlled  CPM    Dyslipidemia, goal LDL below 70  Lipids were checked in February 2019, excellent control.  CPM.      Orders Placed This Encounter   • Electrocardiogram 12-Lead         Return in about 1 year (around 2/28/2020).        
 Symptoms
 Symptoms

## 2020-07-30 NOTE — ED CDU PROVIDER DISPOSITION NOTE - NS ED MD DISPO DISCHARGE
Home Terbinafine Pregnancy And Lactation Text: This medication is Pregnancy Category B and is considered safe during pregnancy. It is also excreted in breast milk and breast feeding isn't recommended.

## 2020-12-15 PROBLEM — Z87.440 HISTORY OF URINARY TRACT INFECTION: Status: RESOLVED | Noted: 2017-12-08 | Resolved: 2020-12-15

## 2021-06-15 PROBLEM — N13.5 CROSSING VESSEL AND STRICTURE OF URETER WITHOUT HYDRONEPHROSIS: Chronic | Status: ACTIVE | Noted: 2017-02-02

## 2021-07-09 ENCOUNTER — APPOINTMENT (OUTPATIENT)
Dept: UROLOGY | Facility: CLINIC | Age: 26
End: 2021-07-09
Payer: MEDICAID

## 2021-07-09 DIAGNOSIS — R35.0 FREQUENCY OF MICTURITION: ICD-10-CM

## 2021-07-09 PROCEDURE — 99203 OFFICE O/P NEW LOW 30 MIN: CPT

## 2021-07-10 LAB
ANION GAP SERPL CALC-SCNC: 13 MMOL/L
APPEARANCE: CLEAR
BACTERIA: NEGATIVE
BILIRUBIN URINE: NEGATIVE
BLOOD URINE: NEGATIVE
BUN SERPL-MCNC: 14 MG/DL
CALCIUM SERPL-MCNC: 10.4 MG/DL
CHLORIDE SERPL-SCNC: 102 MMOL/L
CO2 SERPL-SCNC: 26 MMOL/L
COLOR: NORMAL
CREAT SERPL-MCNC: 1.03 MG/DL
GLUCOSE QUALITATIVE U: NEGATIVE
GLUCOSE SERPL-MCNC: 84 MG/DL
HYALINE CASTS: 0 /LPF
KETONES URINE: NEGATIVE
LEUKOCYTE ESTERASE URINE: ABNORMAL
MICROSCOPIC-UA: NORMAL
NITRITE URINE: NEGATIVE
PH URINE: 7.5
POTASSIUM SERPL-SCNC: 5.1 MMOL/L
PROTEIN URINE: NORMAL
RED BLOOD CELLS URINE: 3 /HPF
SODIUM SERPL-SCNC: 141 MMOL/L
SPECIFIC GRAVITY URINE: 1.02
SQUAMOUS EPITHELIAL CELLS: 1 /HPF
UROBILINOGEN URINE: NORMAL
WHITE BLOOD CELLS URINE: 24 /HPF

## 2021-07-12 NOTE — HISTORY OF PRESENT ILLNESS
[FreeTextEntry1] :  He is a 22-year-old man with history of bilateral ureteropelvic junction obstruction status post metachronous bilateral pyeloplasty procedures performed by robotic-assisted laparoscopic technique at an outside urologist. He has has a residual flank discomfort and pain periodically. He says that he has a fullness sensation like pressure in the back which is more significant on his left than on his right. He recently had developed increasing left-sided flank pain and when he went to the emergency department he was found to have hydronephrosis. The hydronephrosis was not a new finding but because of the pain, the nephrostomy tube was placed. Ultimately cultures from that tube grew Pseudomonas bacteria and he has now been adequately treated with antibiotics. I had performed an antegrade nephrostogram last week which demonstrated fullness of the left-sided collecting system but adequate drainage from the kidney without any sign of obstruction or narrowing at the UPJ. He has now completed a course of antibiotics and he is here to discuss further management and have the nephrostomy tube removed.\par \par here today c/o urinary frequency every 1-2 hours during the day without urgency and 3-4 times a night. he decreased POs but didn't see a difference. The FOS is a bit diminished and can have terminal dribbling but no straining or intermittency. he had a febrile UTI 3/21. He also has some flank pressure from time to time, L>R. \par has not had any f/u imaging in several years.

## 2021-07-12 NOTE — ASSESSMENT
[FreeTextEntry1] : needs further evaluation with urine, blood work and renal/bladder ULS.\par will perform Uroflow when returns.

## 2021-07-13 LAB — BACTERIA UR CULT: ABNORMAL

## 2021-07-20 ENCOUNTER — APPOINTMENT (OUTPATIENT)
Dept: UROLOGY | Facility: CLINIC | Age: 26
End: 2021-07-20
Payer: MEDICAID

## 2021-07-20 ENCOUNTER — OUTPATIENT (OUTPATIENT)
Dept: OUTPATIENT SERVICES | Facility: HOSPITAL | Age: 26
LOS: 1 days | End: 2021-07-20
Payer: MEDICAID

## 2021-07-20 DIAGNOSIS — N20.0 CALCULUS OF KIDNEY: ICD-10-CM

## 2021-07-20 DIAGNOSIS — N13.30 UNSPECIFIED HYDRONEPHROSIS: ICD-10-CM

## 2021-07-20 DIAGNOSIS — R35.0 FREQUENCY OF MICTURITION: ICD-10-CM

## 2021-07-20 DIAGNOSIS — N13.0 HYDRONEPHROSIS WITH URETEROPELVIC JUNCTION OBSTRUCTION: Chronic | ICD-10-CM

## 2021-07-20 PROCEDURE — 76775 US EXAM ABDO BACK WALL LIM: CPT

## 2021-07-20 PROCEDURE — 99213 OFFICE O/P EST LOW 20 MIN: CPT

## 2021-07-20 RX ORDER — SULFAMETHOXAZOLE AND TRIMETHOPRIM 800; 160 MG/1; MG/1
800-160 TABLET ORAL
Qty: 6 | Refills: 0 | Status: ACTIVE | COMMUNITY
Start: 2021-07-20 | End: 1900-01-01

## 2021-07-22 ENCOUNTER — APPOINTMENT (OUTPATIENT)
Dept: UROLOGY | Facility: CLINIC | Age: 26
End: 2021-07-22

## 2021-07-22 NOTE — HISTORY OF PRESENT ILLNESS
[FreeTextEntry1] :  He is a 22-year-old man with history of bilateral ureteropelvic junction obstruction status post metachronous bilateral pyeloplasty procedures performed by robotic-assisted laparoscopic technique at an outside urologist. He has has a residual flank discomfort and pain periodically. He says that he has a fullness sensation like pressure in the back which is more significant on his left than on his right. He recently had developed increasing left-sided flank pain and when he went to the emergency department he was found to have hydronephrosis. The hydronephrosis was not a new finding but because of the pain, the nephrostomy tube was placed. Ultimately cultures from that tube grew Pseudomonas bacteria and he has now been adequately treated with antibiotics. I had performed an antegrade nephrostogram last week which demonstrated fullness of the left-sided collecting system but adequate drainage from the kidney without any sign of obstruction or narrowing at the UPJ. He has now completed a course of antibiotics and he is here to discuss further management and have the nephrostomy tube removed.\par \par here last week c/o urinary frequency every 1-2 hours during the day without urgency and 3-4 times a night. he decreased POs but didn't see a difference. The FOS is a bit diminished and can have terminal dribbling but no straining or intermittency. he had a febrile UTI 3/21. He also has some flank pressure from time to time, L>R. \par has not had any f/u imaging in several years. \par \par ULS notes some hydro and a stone. \par Culture grew staph -

## 2021-07-28 ENCOUNTER — OUTPATIENT (OUTPATIENT)
Dept: OUTPATIENT SERVICES | Facility: HOSPITAL | Age: 26
LOS: 1 days | End: 2021-07-28
Payer: MEDICAID

## 2021-07-28 ENCOUNTER — APPOINTMENT (OUTPATIENT)
Dept: CT IMAGING | Facility: IMAGING CENTER | Age: 26
End: 2021-07-28
Payer: MEDICAID

## 2021-07-28 DIAGNOSIS — N13.0 HYDRONEPHROSIS WITH URETEROPELVIC JUNCTION OBSTRUCTION: Chronic | ICD-10-CM

## 2021-07-28 DIAGNOSIS — N20.0 CALCULUS OF KIDNEY: ICD-10-CM

## 2021-07-28 DIAGNOSIS — N13.30 UNSPECIFIED HYDRONEPHROSIS: ICD-10-CM

## 2021-07-28 PROCEDURE — 74178 CT ABD&PLV WO CNTR FLWD CNTR: CPT | Mod: 26

## 2021-07-28 PROCEDURE — 82565 ASSAY OF CREATININE: CPT

## 2021-07-28 PROCEDURE — 74178 CT ABD&PLV WO CNTR FLWD CNTR: CPT

## 2021-08-04 ENCOUNTER — APPOINTMENT (OUTPATIENT)
Dept: UROLOGY | Facility: CLINIC | Age: 26
End: 2021-08-04
Payer: MEDICAID

## 2021-08-04 DIAGNOSIS — N20.0 CALCULUS OF KIDNEY: ICD-10-CM

## 2021-08-04 PROCEDURE — 99213 OFFICE O/P EST LOW 20 MIN: CPT

## 2021-08-04 NOTE — HISTORY OF PRESENT ILLNESS
[FreeTextEntry1] :  He is a 22-year-old man with history of bilateral ureteropelvic junction obstruction status post metachronous bilateral pyeloplasty procedures performed by robotic-assisted laparoscopic technique at an outside urologist. He has has a residual flank discomfort and pain periodically. He says that he has a fullness sensation like pressure in the back which is more significant on his left than on his right. He recently had developed increasing left-sided flank pain and when he went to the emergency department he was found to have hydronephrosis. The hydronephrosis was not a new finding but because of the pain, the nephrostomy tube was placed. Ultimately cultures from that tube grew Pseudomonas bacteria and he has now been adequately treated with antibiotics. I had performed an antegrade nephrostogram last week which demonstrated fullness of the left-sided collecting system but adequate drainage from the kidney without any sign of obstruction or narrowing at the UPJ. He has now completed a course of antibiotics and he is here to discuss further management and have the nephrostomy tube removed.\par \par here last week c/o urinary frequency every 1-2 hours during the day without urgency and 3-4 times a night. he decreased POs but didn't see a difference. The FOS is a bit diminished and can have terminal dribbling but no straining or intermittency. he had a febrile UTI 3/21. He also has some flank pressure from time to time, L>R. \par has not had any f/u imaging in several years. \par \par ULS notes some hydro and a stone. \par Culture grew staph - treated UTI and frequency better,thgough feels coming back a bit. \par CT notes 4 stones RIGH T kidney.\par nephrograms and excretion equal.

## 2021-08-04 NOTE — ASSESSMENT
[FreeTextEntry1] : reviewed CT scan - 4 stones in the right: UP and LP\par discussed options - plan for ureteroscopy over ESWL based on location and anatomy.

## 2021-08-06 NOTE — ED ADULT NURSE NOTE - FALLEN IN THE PAST
PAST MEDICAL HISTORY:  Basal cell carcinoma in situ of skin removed from neck    Bilateral malignant neoplasm of breast in female, unspecified site of breast b/l    Gastroesophageal reflux disease without esophagitis     History of macular degeneration bilateral    Insomnia, unspecified type     Malignant neoplasm of left female breast, unspecified site of breast with Lymph node involvement    Neoplasm by body site left anterior chest wall    Obesity     Osteoarthritis of both knees, unspecified osteoarthritis type     Spinal stenosis of lumbar region     Urinary frequency     
no

## 2021-08-12 DIAGNOSIS — A49.9 URINARY TRACT INFECTION, SITE NOT SPECIFIED: ICD-10-CM

## 2021-08-12 DIAGNOSIS — N39.0 URINARY TRACT INFECTION, SITE NOT SPECIFIED: ICD-10-CM

## 2021-08-12 LAB — BACTERIA UR CULT: ABNORMAL

## 2021-08-12 RX ORDER — CEPHALEXIN 500 MG/1
500 TABLET ORAL 3 TIMES DAILY
Qty: 21 | Refills: 0 | Status: ACTIVE | COMMUNITY
Start: 2021-08-12 | End: 1900-01-01

## 2021-08-13 ENCOUNTER — NON-APPOINTMENT (OUTPATIENT)
Age: 26
End: 2021-08-13

## 2021-08-17 ENCOUNTER — OUTPATIENT (OUTPATIENT)
Dept: OUTPATIENT SERVICES | Facility: HOSPITAL | Age: 26
LOS: 1 days | End: 2021-08-17
Payer: MEDICAID

## 2021-08-17 VITALS
DIASTOLIC BLOOD PRESSURE: 84 MMHG | WEIGHT: 175.93 LBS | HEIGHT: 67 IN | HEART RATE: 84 BPM | OXYGEN SATURATION: 97 % | TEMPERATURE: 99 F | RESPIRATION RATE: 18 BRPM | SYSTOLIC BLOOD PRESSURE: 128 MMHG

## 2021-08-17 DIAGNOSIS — N13.30 UNSPECIFIED HYDRONEPHROSIS: ICD-10-CM

## 2021-08-17 DIAGNOSIS — N20.0 CALCULUS OF KIDNEY: ICD-10-CM

## 2021-08-17 DIAGNOSIS — Z01.818 ENCOUNTER FOR OTHER PREPROCEDURAL EXAMINATION: ICD-10-CM

## 2021-08-17 DIAGNOSIS — N13.0 HYDRONEPHROSIS WITH URETEROPELVIC JUNCTION OBSTRUCTION: Chronic | ICD-10-CM

## 2021-08-17 LAB
ANION GAP SERPL CALC-SCNC: 14 MMOL/L — SIGNIFICANT CHANGE UP (ref 5–17)
BUN SERPL-MCNC: 11 MG/DL — SIGNIFICANT CHANGE UP (ref 7–23)
CALCIUM SERPL-MCNC: 10.1 MG/DL — SIGNIFICANT CHANGE UP (ref 8.4–10.5)
CHLORIDE SERPL-SCNC: 100 MMOL/L — SIGNIFICANT CHANGE UP (ref 96–108)
CO2 SERPL-SCNC: 25 MMOL/L — SIGNIFICANT CHANGE UP (ref 22–31)
CREAT SERPL-MCNC: 0.87 MG/DL — SIGNIFICANT CHANGE UP (ref 0.5–1.3)
GLUCOSE SERPL-MCNC: 101 MG/DL — HIGH (ref 70–99)
HCT VFR BLD CALC: 40.8 % — SIGNIFICANT CHANGE UP (ref 39–50)
HGB BLD-MCNC: 12.7 G/DL — LOW (ref 13–17)
MCHC RBC-ENTMCNC: 19.4 PG — LOW (ref 27–34)
MCHC RBC-ENTMCNC: 31.1 GM/DL — LOW (ref 32–36)
MCV RBC AUTO: 62.5 FL — LOW (ref 80–100)
NRBC # BLD: 0 /100 WBCS — SIGNIFICANT CHANGE UP (ref 0–0)
PLATELET # BLD AUTO: 267 K/UL — SIGNIFICANT CHANGE UP (ref 150–400)
POTASSIUM SERPL-MCNC: 3.9 MMOL/L — SIGNIFICANT CHANGE UP (ref 3.5–5.3)
POTASSIUM SERPL-SCNC: 3.9 MMOL/L — SIGNIFICANT CHANGE UP (ref 3.5–5.3)
RBC # BLD: 6.53 M/UL — HIGH (ref 4.2–5.8)
RBC # FLD: 18.1 % — HIGH (ref 10.3–14.5)
SODIUM SERPL-SCNC: 139 MMOL/L — SIGNIFICANT CHANGE UP (ref 135–145)
WBC # BLD: 8.29 K/UL — SIGNIFICANT CHANGE UP (ref 3.8–10.5)
WBC # FLD AUTO: 8.29 K/UL — SIGNIFICANT CHANGE UP (ref 3.8–10.5)

## 2021-08-17 PROCEDURE — G0463: CPT

## 2021-08-17 PROCEDURE — 85027 COMPLETE CBC AUTOMATED: CPT

## 2021-08-17 PROCEDURE — 87086 URINE CULTURE/COLONY COUNT: CPT

## 2021-08-17 PROCEDURE — 80048 BASIC METABOLIC PNL TOTAL CA: CPT

## 2021-08-17 RX ORDER — CEFAZOLIN SODIUM 1 G
2000 VIAL (EA) INJECTION ONCE
Refills: 0 | Status: DISCONTINUED | OUTPATIENT
Start: 2021-08-23 | End: 2021-09-06

## 2021-08-17 NOTE — H&P PST ADULT - PROBLEM SELECTOR PLAN 1
Right Flexible Ureteroscopy, Laser Lithotripsy, JJ stent Placement on 8/23/21.  Pre- op Instructions discussed   labs sent ,urine culture sent  Covid test 8/21/21

## 2021-08-17 NOTE — H&P PST ADULT - NSICDXPASTMEDICALHX_GEN_ALL_CORE_FT
PAST MEDICAL HISTORY:  Recurrent UTI     Renal calculi     Ureteropelvic junction (UPJ) obstruction

## 2021-08-17 NOTE — H&P PST ADULT - HISTORY OF PRESENT ILLNESS
26 Male with sig PMH  hx of bl UPJ obstruction - left repaired Dec 2016 and right May 2017, nephrolithiasis requiring stenting In 2017, Acquired hydronephrosis with ureteropelvic junction (UPJ) obstruction. Pt has been c/o  urinary frequency , frequent UTI He also has some flank pressure U/S notes some hydro and a stone. CT scans reveled Right  kidney stones. Presents to [facility] for scheduled  Right Flexible Ureteroscopy, Laser Lithotripsy, JJ stent Placement on 8/23/21.    Covid test 8/21/21

## 2021-08-17 NOTE — H&P PST ADULT - NSICDXPASTSURGICALHX_GEN_ALL_CORE_FT
PAST SURGICAL HISTORY:  Acquired hydronephrosis with ureteropelvic junction (UPJ) obstruction s/p left repair jan 2016 ,right 2017

## 2021-08-17 NOTE — H&P PST ADULT - HEIGHT IN INCHES
Outpatient Infusion Center Short Visit Progress Note      0800 Pt admit to [facility] for Lanreotide and labs ambulatory in stable condition. Assessment completed. No new concerns voiced. Please review pending lab results in [facility]. Pt states that she has started new radionucleotide treatment in [place], . [address] and will be getting total three treatments q 8 weeks. Pt states that she is feeling good and will be running her fourth half marathon this year in a couple of weeks.      Patient Vitals for the past 12 hrs:   Temp Pulse Resp BP SpO2   05/18/17 0818 98.3 °F (36.8 °C) 85 16 132/71 100 %        Medications:  Lanreotide SQ left gluteus      0845 Pt tolerated treatment well. D/c home ambulatory in no distress. Pt aware of next appointment scheduled for 6/16/17. 7

## 2021-08-19 ENCOUNTER — INPATIENT (INPATIENT)
Facility: HOSPITAL | Age: 26
LOS: 1 days | Discharge: HOME | DRG: 189 | End: 2021-08-21
Attending: HOSPITALIST | Admitting: HOSPITALIST
Payer: MEDICAID

## 2021-08-19 VITALS
DIASTOLIC BLOOD PRESSURE: 65 MMHG | WEIGHT: 175.05 LBS | OXYGEN SATURATION: 91 % | RESPIRATION RATE: 22 BRPM | SYSTOLIC BLOOD PRESSURE: 119 MMHG | HEART RATE: 109 BPM | HEIGHT: 67 IN | TEMPERATURE: 100 F

## 2021-08-19 DIAGNOSIS — N13.0 HYDRONEPHROSIS WITH URETEROPELVIC JUNCTION OBSTRUCTION: Chronic | ICD-10-CM

## 2021-08-19 PROBLEM — N39.0 URINARY TRACT INFECTION, SITE NOT SPECIFIED: Chronic | Status: ACTIVE | Noted: 2021-08-17

## 2021-08-19 LAB
CULTURE RESULTS: NO GROWTH — SIGNIFICANT CHANGE UP
SPECIMEN SOURCE: SIGNIFICANT CHANGE UP

## 2021-08-19 PROCEDURE — 99285 EMERGENCY DEPT VISIT HI MDM: CPT

## 2021-08-19 PROCEDURE — 93010 ELECTROCARDIOGRAM REPORT: CPT

## 2021-08-20 ENCOUNTER — TRANSCRIPTION ENCOUNTER (OUTPATIENT)
Age: 26
End: 2021-08-20

## 2021-08-20 DIAGNOSIS — N13.30 UNSPECIFIED HYDRONEPHROSIS: ICD-10-CM

## 2021-08-20 DIAGNOSIS — J96.01 ACUTE RESPIRATORY FAILURE WITH HYPOXIA: ICD-10-CM

## 2021-08-20 DIAGNOSIS — U07.1 COVID-19: ICD-10-CM

## 2021-08-20 DIAGNOSIS — N13.5 CROSSING VESSEL AND STRICTURE OF URETER WITHOUT HYDRONEPHROSIS: ICD-10-CM

## 2021-08-20 DIAGNOSIS — Z29.9 ENCOUNTER FOR PROPHYLACTIC MEASURES, UNSPECIFIED: ICD-10-CM

## 2021-08-20 DIAGNOSIS — N39.0 URINARY TRACT INFECTION, SITE NOT SPECIFIED: ICD-10-CM

## 2021-08-20 LAB
ALBUMIN SERPL ELPH-MCNC: 4.9 G/DL — SIGNIFICANT CHANGE UP (ref 3.3–5)
ALP SERPL-CCNC: 68 U/L — SIGNIFICANT CHANGE UP (ref 40–120)
ALT FLD-CCNC: 38 U/L — SIGNIFICANT CHANGE UP (ref 10–45)
ANION GAP SERPL CALC-SCNC: 14 MMOL/L — SIGNIFICANT CHANGE UP (ref 5–17)
ANISOCYTOSIS BLD QL: SIGNIFICANT CHANGE UP
APPEARANCE UR: CLEAR — SIGNIFICANT CHANGE UP
APTT BLD: 32.1 SEC — SIGNIFICANT CHANGE UP (ref 27.5–35.5)
AST SERPL-CCNC: 26 U/L — SIGNIFICANT CHANGE UP (ref 10–40)
BACTERIA # UR AUTO: NEGATIVE — SIGNIFICANT CHANGE UP
BASE EXCESS BLDV CALC-SCNC: 2.6 MMOL/L — HIGH (ref -2–2)
BASOPHILS # BLD AUTO: 0 K/UL — SIGNIFICANT CHANGE UP (ref 0–0.2)
BASOPHILS NFR BLD AUTO: 0 % — SIGNIFICANT CHANGE UP (ref 0–2)
BILIRUB SERPL-MCNC: 0.8 MG/DL — SIGNIFICANT CHANGE UP (ref 0.2–1.2)
BILIRUB UR-MCNC: NEGATIVE — SIGNIFICANT CHANGE UP
BUN SERPL-MCNC: 16 MG/DL — SIGNIFICANT CHANGE UP (ref 7–23)
CA-I SERPL-SCNC: 1.18 MMOL/L — SIGNIFICANT CHANGE UP (ref 1.15–1.33)
CALCIUM SERPL-MCNC: 10.4 MG/DL — SIGNIFICANT CHANGE UP (ref 8.4–10.5)
CHLORIDE BLDV-SCNC: 104 MMOL/L — SIGNIFICANT CHANGE UP (ref 96–108)
CHLORIDE SERPL-SCNC: 101 MMOL/L — SIGNIFICANT CHANGE UP (ref 96–108)
CO2 BLDV-SCNC: 28 MMOL/L — HIGH (ref 22–26)
CO2 SERPL-SCNC: 23 MMOL/L — SIGNIFICANT CHANGE UP (ref 22–31)
COLOR SPEC: YELLOW — SIGNIFICANT CHANGE UP
COVID-19 SPIKE DOMAIN AB INTERP: NEGATIVE — SIGNIFICANT CHANGE UP
COVID-19 SPIKE DOMAIN ANTIBODY RESULT: 0.4 U/ML — SIGNIFICANT CHANGE UP
CREAT SERPL-MCNC: 1.04 MG/DL — SIGNIFICANT CHANGE UP (ref 0.5–1.3)
CRP SERPL-MCNC: 86 MG/L — HIGH (ref 0–4)
D DIMER BLD IA.RAPID-MCNC: 231 NG/ML DDU — HIGH
DACRYOCYTES BLD QL SMEAR: SLIGHT — SIGNIFICANT CHANGE UP
DIFF PNL FLD: NEGATIVE — SIGNIFICANT CHANGE UP
ELLIPTOCYTES BLD QL SMEAR: SLIGHT — SIGNIFICANT CHANGE UP
EOSINOPHIL # BLD AUTO: 0.15 K/UL — SIGNIFICANT CHANGE UP (ref 0–0.5)
EOSINOPHIL NFR BLD AUTO: 0.9 % — SIGNIFICANT CHANGE UP (ref 0–6)
EPI CELLS # UR: 1 /HPF — SIGNIFICANT CHANGE UP
FERRITIN SERPL-MCNC: 210 NG/ML — SIGNIFICANT CHANGE UP (ref 30–400)
FERRITIN SERPL-MCNC: 219 NG/ML — SIGNIFICANT CHANGE UP (ref 30–400)
FIBRINOGEN PPP-MCNC: 652 MG/DL — HIGH (ref 290–520)
GAS PNL BLDV: 139 MMOL/L — SIGNIFICANT CHANGE UP (ref 136–145)
GAS PNL BLDV: SIGNIFICANT CHANGE UP
GLUCOSE BLDV-MCNC: 89 MG/DL — SIGNIFICANT CHANGE UP (ref 70–99)
GLUCOSE SERPL-MCNC: 104 MG/DL — HIGH (ref 70–99)
GLUCOSE UR QL: NEGATIVE — SIGNIFICANT CHANGE UP
HCO3 BLDV-SCNC: 27 MMOL/L — SIGNIFICANT CHANGE UP (ref 22–29)
HCT VFR BLD CALC: 40.5 % — SIGNIFICANT CHANGE UP (ref 39–50)
HCT VFR BLDA CALC: 40 % — SIGNIFICANT CHANGE UP (ref 39–51)
HGB BLD CALC-MCNC: 13.4 G/DL — SIGNIFICANT CHANGE UP (ref 12.6–17.4)
HGB BLD-MCNC: 12.7 G/DL — LOW (ref 13–17)
HYALINE CASTS # UR AUTO: 0 /LPF — SIGNIFICANT CHANGE UP (ref 0–2)
INR BLD: 1.12 RATIO — SIGNIFICANT CHANGE UP (ref 0.88–1.16)
KETONES UR-MCNC: NEGATIVE — SIGNIFICANT CHANGE UP
LACTATE BLDV-MCNC: 1.4 MMOL/L — SIGNIFICANT CHANGE UP (ref 0.7–2)
LDH SERPL L TO P-CCNC: 171 U/L — SIGNIFICANT CHANGE UP (ref 50–242)
LEUKOCYTE ESTERASE UR-ACNC: NEGATIVE — SIGNIFICANT CHANGE UP
LYMPHOCYTES # BLD AUTO: 17.4 % — SIGNIFICANT CHANGE UP (ref 13–44)
LYMPHOCYTES # BLD AUTO: 2.85 K/UL — SIGNIFICANT CHANGE UP (ref 1–3.3)
MACROCYTES BLD QL: SLIGHT — SIGNIFICANT CHANGE UP
MANUAL SMEAR VERIFICATION: SIGNIFICANT CHANGE UP
MCHC RBC-ENTMCNC: 19.8 PG — LOW (ref 27–34)
MCHC RBC-ENTMCNC: 31.4 GM/DL — LOW (ref 32–36)
MCV RBC AUTO: 63.1 FL — LOW (ref 80–100)
MICROCYTES BLD QL: SIGNIFICANT CHANGE UP
MONOCYTES # BLD AUTO: 1.13 K/UL — HIGH (ref 0–0.9)
MONOCYTES NFR BLD AUTO: 6.9 % — SIGNIFICANT CHANGE UP (ref 2–14)
NEUTROPHILS # BLD AUTO: 12.27 K/UL — HIGH (ref 1.8–7.4)
NEUTROPHILS NFR BLD AUTO: 74.8 % — SIGNIFICANT CHANGE UP (ref 43–77)
NITRITE UR-MCNC: NEGATIVE — SIGNIFICANT CHANGE UP
OVALOCYTES BLD QL SMEAR: SLIGHT — SIGNIFICANT CHANGE UP
PCO2 BLDV: 41 MMHG — LOW (ref 42–55)
PH BLDV: 7.43 — SIGNIFICANT CHANGE UP (ref 7.32–7.43)
PH UR: 6.5 — SIGNIFICANT CHANGE UP (ref 5–8)
PLAT MORPH BLD: NORMAL — SIGNIFICANT CHANGE UP
PLATELET # BLD AUTO: 268 K/UL — SIGNIFICANT CHANGE UP (ref 150–400)
PO2 BLDV: 33 MMHG — SIGNIFICANT CHANGE UP (ref 25–45)
POIKILOCYTOSIS BLD QL AUTO: SLIGHT — SIGNIFICANT CHANGE UP
POLYCHROMASIA BLD QL SMEAR: SLIGHT — SIGNIFICANT CHANGE UP
POTASSIUM BLDV-SCNC: 5.1 MMOL/L — SIGNIFICANT CHANGE UP (ref 3.5–5.1)
POTASSIUM SERPL-MCNC: 4.2 MMOL/L — SIGNIFICANT CHANGE UP (ref 3.5–5.3)
POTASSIUM SERPL-SCNC: 4.2 MMOL/L — SIGNIFICANT CHANGE UP (ref 3.5–5.3)
PROCALCITONIN SERPL-MCNC: 0.16 NG/ML — HIGH (ref 0.02–0.1)
PROT SERPL-MCNC: 7.7 G/DL — SIGNIFICANT CHANGE UP (ref 6–8.3)
PROT UR-MCNC: ABNORMAL
PROTHROM AB SERPL-ACNC: 13.4 SEC — SIGNIFICANT CHANGE UP (ref 10.6–13.6)
RAPID RVP RESULT: DETECTED
RBC # BLD: 6.42 M/UL — HIGH (ref 4.2–5.8)
RBC # FLD: 17.8 % — HIGH (ref 10.3–14.5)
RBC BLD AUTO: ABNORMAL
RBC CASTS # UR COMP ASSIST: 1 /HPF — SIGNIFICANT CHANGE UP (ref 0–4)
RV+EV RNA SPEC QL NAA+PROBE: DETECTED
SAO2 % BLDV: 63.8 % — LOW (ref 67–88)
SARS-COV-2 IGG+IGM SERPL QL IA: 0.4 U/ML — SIGNIFICANT CHANGE UP
SARS-COV-2 IGG+IGM SERPL QL IA: NEGATIVE — SIGNIFICANT CHANGE UP
SARS-COV-2 RNA SPEC QL NAA+PROBE: SIGNIFICANT CHANGE UP
SARS-COV-2 RNA SPEC QL NAA+PROBE: SIGNIFICANT CHANGE UP
SODIUM SERPL-SCNC: 138 MMOL/L — SIGNIFICANT CHANGE UP (ref 135–145)
SP GR SPEC: 1.03 — HIGH (ref 1.01–1.02)
TARGETS BLD QL SMEAR: SLIGHT — SIGNIFICANT CHANGE UP
UROBILINOGEN FLD QL: ABNORMAL
WBC # BLD: 16.4 K/UL — HIGH (ref 3.8–10.5)
WBC # FLD AUTO: 16.4 K/UL — HIGH (ref 3.8–10.5)
WBC UR QL: 2 /HPF — SIGNIFICANT CHANGE UP (ref 0–5)

## 2021-08-20 PROCEDURE — 99223 1ST HOSP IP/OBS HIGH 75: CPT

## 2021-08-20 PROCEDURE — 71045 X-RAY EXAM CHEST 1 VIEW: CPT | Mod: 26

## 2021-08-20 RX ORDER — ENOXAPARIN SODIUM 100 MG/ML
40 INJECTION SUBCUTANEOUS DAILY
Refills: 0 | Status: DISCONTINUED | OUTPATIENT
Start: 2021-08-20 | End: 2021-08-21

## 2021-08-20 RX ORDER — ACETAMINOPHEN 500 MG
650 TABLET ORAL ONCE
Refills: 0 | Status: COMPLETED | OUTPATIENT
Start: 2021-08-20 | End: 2021-08-20

## 2021-08-20 RX ORDER — DEXAMETHASONE 0.5 MG/5ML
6 ELIXIR ORAL ONCE
Refills: 0 | Status: COMPLETED | OUTPATIENT
Start: 2021-08-20 | End: 2021-08-20

## 2021-08-20 RX ORDER — ACETAMINOPHEN 500 MG
975 TABLET ORAL ONCE
Refills: 0 | Status: COMPLETED | OUTPATIENT
Start: 2021-08-20 | End: 2021-08-20

## 2021-08-20 RX ADMIN — Medication 975 MILLIGRAM(S): at 02:01

## 2021-08-20 RX ADMIN — Medication 6 MILLIGRAM(S): at 02:01

## 2021-08-20 RX ADMIN — Medication 975 MILLIGRAM(S): at 07:57

## 2021-08-20 NOTE — H&P ADULT - NSHPLABSRESULTS_GEN_ALL_CORE
EKG personally reviewed notable for Sinus Tachycardia, no ST changes  CXR personally Reviewed: no acute infiltrate or effusion

## 2021-08-20 NOTE — DISCHARGE NOTE PROVIDER - NSDCFUSCHEDAPPT_GEN_ALL_CORE_FT
TRAE NGO ; 08/23/2021 ; [facility] [address] TRAE Parnell ; 08/23/2021 ; NSSHAHLA Chavez TRAE NGO ; 08/23/2021 ; [facility] MALCOLMSameTRAE Torres ; 08/23/2021 ; [facility] [address]

## 2021-08-20 NOTE — DISCHARGE NOTE PROVIDER - CARE PROVIDER_API CALL
Yola Banda  [facility] MEDICINE  [address] [place] [address], [place] [address]  Phone: [phone]  Fax: [phone]  Established Patient  Follow Up Time: 1 week

## 2021-08-20 NOTE — ED PROVIDER NOTE - ATTENDING CONTRIBUTION TO CARE
26 year old male presented to ED with 3 days of URI symptoms, noted to be hypoxic to 87% on RA. Pt is not vaccinated for COVID. Impression: Respiratory failure secondary to URI. Plan: Labs, cxr, supplemental oxygen. Admit

## 2021-08-20 NOTE — H&P ADULT - PROBLEM SELECTOR PLAN 3
Finishing Keflex 7 day course today. Currently asymptomatic, UA unrevealing  - last dose this am  - ctm for urinary symptoms

## 2021-08-20 NOTE — DISCHARGE NOTE PROVIDER - NSDCMRMEDTOKEN_GEN_ALL_CORE_FT
cephalexin 500 mg oral capsule: 1 cap(s) orally every 8 hours x 7 days - started 8/12/21   guaiFENesin 100 mg/5 mL oral liquid: 5 milliliter(s) orally every 6 hours, As needed, Cough

## 2021-08-20 NOTE — ED PROVIDER NOTE - CLINICAL SUMMARY MEDICAL DECISION MAKING FREE TEXT BOX
Unvaccinated young pt on day 3 of URI symptoms. Hypoxic on RA. Concern for COVID 19. Will obtain labs, cxr, give decadron, rvp, ekg, fluids, tylenol. Reassess.

## 2021-08-20 NOTE — DISCHARGE NOTE PROVIDER - NSDCCPCAREPLAN_GEN_ALL_CORE_FT
PRINCIPAL DISCHARGE DIAGNOSIS  Diagnosis: Viral respiratory infection  Assessment and Plan of Treatment: COVID negative x 2  Initially required supplemental oxygen, now weaned off  Tested + for rhinovirus (common cold)  Home Instructions:  Wear a facemask when you are around other people. Cover your cough and sneezes.  Clean your hands often.  Avoid sharing personal household items.  Clean all frequently touched surfaces daily.   Follow-up with your PCP in one week.      SECONDARY DISCHARGE DIAGNOSES  Diagnosis: Recurrent UTI  Assessment and Plan of Treatment: You completed course of oral antibiotics.  Home Instruction:  Drink enough water and fluids to keep your urine clear or pale yellow.  Avoid caffeine, tea, and carbonated beverages. They tend to irritate your bladder.  Empty your bladder often. Avoid holding urine for long periods of time.  Empty your bladder before and after sexual intercourse.  After a bowel movement, women should cleanse from front to back. Use each tissue only once.  SEEK MEDICAL CARE IF:  You have back pain.  You develop a fever.  Your symptoms do not begin to resolve within 3 days.  SEEK IMMEDIATE MEDICAL CARE IF:  You have severe back pain or lower abdominal pain.  You develop chills.  You have nausea or vomiting.  You have continued burning or discomfort with urination.    Diagnosis: Leukocytosis  Assessment and Plan of Treatment: WBC elevated 16k, in the setting of rhinovirus (common cold)  Stay hydrated  Follow-up with your PCP in one week as advised       PRINCIPAL DISCHARGE DIAGNOSIS  Diagnosis: Viral respiratory infection  Assessment and Plan of Treatment: COVID negative x 2  Initially required supplemental oxygen, now weaned off  Tested + for rhinovirus (common cold)  Home Instructions:  Wear a facemask when you are around other people. Cover your cough and sneezes.  Clean your hands often.  Avoid sharing personal household items.  Clean all frequently touched surfaces daily.   Follow-up with your PCP in one week.      SECONDARY DISCHARGE DIAGNOSES  Diagnosis: Recurrent UTI  Assessment and Plan of Treatment: You completed course of oral antibiotics.  Home Instruction:  Drink enough water and fluids to keep your urine clear or pale yellow.  Avoid caffeine, tea, and carbonated beverages. They tend to irritate your bladder.  Empty your bladder often. Avoid holding urine for long periods of time.  Empty your bladder before and after sexual intercourse.  After a bowel movement, women should cleanse from front to back. Use each tissue only once.  SEEK MEDICAL CARE IF:  You have back pain.  You develop a fever.  Your symptoms do not begin to resolve within 3 days.  SEEK IMMEDIATE MEDICAL CARE IF:  You have severe back pain or lower abdominal pain.  You develop chills.  You have nausea or vomiting.  You have continued burning or discomfort with urination.    Diagnosis: Leukocytosis  Assessment and Plan of Treatment: WBC elevated 16k, in the setting of rhinovirus (common cold)  Stay hydrated  Follow-up with your PCP in one week as advised      Diagnosis: Kidney stones  Assessment and Plan of Treatment: Outpatient follow up with Urology monday for removal

## 2021-08-20 NOTE — H&P ADULT - PROBLEM SELECTOR PLAN 2
with resulting obstruction/hydro and history of repairs. Last CT A/B this month without signs of hydro, with nonobstructing stones  - planning for Stent per patient on Monday with resulting obstruction/hydro and history of repairs. Last CT A/p this month without signs of hydro, with nonobstructing stones  - planning for Stent per patient on Monday

## 2021-08-20 NOTE — ED ADULT NURSE NOTE - OBJECTIVE STATEMENT
26y male, PMH kidney stones, UTI's recently on ABX, complaining of shortness of breath for 4 days, fever, headache, generalized weakness, reports that he feels like he "cant take a deep breath", 92-93% on RA, denies headache, chest pain, abd pain, n/v/d, moves all extremities w/+ pulses, bed in lowest position, comfort and safety provided.

## 2021-08-20 NOTE — DISCHARGE NOTE PROVIDER - HOSPITAL COURSE
25 y/o M with thalassemia minor, hydronephrosis with recurrent UTI presenting with 1-day hx of SOB & near syncopal episode, after exposure to family with URI symptoms. Of note, patient s/p course of Keflex for a UTI as of 8/20. In the ED, patient found to be afebrile, however hypoxic to 87% on RA improved to 94% on 2L. Labs notable for leukocytosis & marginally elevated PCT. COVID swab neg x 1 (pending 2nd COVID result), found to be RVP + rhinovirus. CXR with no infiltrates, s/p Tylenol & Dexamethasone x 1 dose. Patient was subsequently weaned off supplemental O2, maintain sats >94% on RA....    25 y/o M with thalassemia minor, hydronephrosis with recurrent UTI presenting with 1-day hx of SOB & near syncopal episode, after exposure to family with URI symptoms. Of note, patient s/p course of Keflex for a UTI as of 8/20. In the ED, patient found to be afebrile, however hypoxic to 87% on RA improved to 94% on 2L. Labs notable for leukocytosis & marginally elevated PCT. COVID swab neg x 2, found to be RVP + rhinovirus. CXR with no infiltrates, s/p Tylenol & Dexamethasone x 1 dose. Patient was subsequently weaned off supplemental O2, maintain sats >94% on RA, patient with initial leukocytosis, repeat WBC.....   25 y/o M with thalassemia minor, hydronephrosis with recurrent UTI presenting with 1-day hx of SOB & near syncopal episode, after exposure to family with URI symptoms. Of note, patient s/p course of Keflex for a UTI as of 8/20. In the ED, patient found to be afebrile, however hypoxic to 87% on RA improved to 94% on 2L. Labs notable for leukocytosis & marginally elevated PCT. COVID swab neg x 2, found to be RVP + rhinovirus. CXR with no infiltrates, s/p Tylenol & Dexamethasone x 1 dose. Patient was subsequently weaned off supplemental O2, maintain sats >94% on RA, patient with initial leukocytosis, repeat WBC improved. Outpatient follow up with PMD.

## 2021-08-20 NOTE — H&P ADULT - ASSESSMENT
25 y/o M unvaccinated for COVID with thalassemia minor, hydronephrosis with recurrent UTI presenting acute hypoxemic respiratory failure with initial concerns for COVID, found to have rhinovirus, COVID neg *1.

## 2021-08-20 NOTE — H&P ADULT - HISTORY OF PRESENT ILLNESS
CC: Shortness of breath for 1 day    HPI: 25 y/o M with thalassemia minor, hydronephrosis with recurrent UTI presenting with SOB for 1 day.    Patient Lives with his girlfriend who works at a . She had new onset fevers/chills/rhinorrhea 5 days ago. 3 days ago, patient developed new onset f/chills/rhinorrhea. yesterday noticed new onset sob almost syncopized in shower so presented to ED. Denies c/p/sob/n/v/dysuria. Currently on Keflex for a UTI, last dose today.    In ED T 37.8, HR 80s HDS, satting 87% on RA improved to 94% on 2L. Labs notable for wbc 16 74% neutrophils, D-dimer 231, CRP 86 PCT 0.15. COVID swab neg *1 RVP + rhinovirus. CXr wnl. Received acetaminophen *1 and Dex 6mg.

## 2021-08-20 NOTE — ED PROVIDER NOTE - PHYSICAL EXAMINATION
General: NAD, warm  HEENT: NCAT, PERRL  Cardiac: RRR, no murmurs, 2+ peripheral pulses  Chest: CTA  Abdomen: soft, non-distended, bowel sounds present, no ttp, no rebound or guarding  Extremities: no peripheral edema, calf tenderness, or leg size discrepancies  Skin: no rashes  Neuro: AAOx3, motor and sensory grossly intact  Psych: mood and affect appropriate

## 2021-08-20 NOTE — H&P ADULT - PROBLEM SELECTOR PLAN 1
Initial presentation highly suspicious for COVID however found to be rhinovirus + and COVID neg *1. Clinical history fits well with rhinovirus given girlfriend exposure and similar story with RSV in the apst, however acute hypoxemia less likely in relatively healthy individual. For this reason, COVID remains on differential and must rule out  - maintain on Airborne + contact precautions  [] f/u repeat COVID swab, if negative can consider d/c precautions  - 02 support prn, titrate to goal sat >94%. Currently on RA sat 95-97%  - s/p Dex 6mg *1. Will hold off on further given COVID diagnosis not confirmed  - tylenol prn for F

## 2021-08-20 NOTE — ED PROVIDER NOTE - OBJECTIVE STATEMENT
25yo M hx of thalassemia minor, hydronephrosis here for SOB. 3 days of cough, fevers, today w/ SOB, almost syncopized in shower. Not vaccinated, other contacts in his family all have similar URI symptoms. Denies cp, n/v, abdominal pain, change in urine or bowel. Currently being treated w/ keflex for UTI.

## 2021-08-21 ENCOUNTER — TRANSCRIPTION ENCOUNTER (OUTPATIENT)
Age: 26
End: 2021-08-21

## 2021-08-21 VITALS
DIASTOLIC BLOOD PRESSURE: 72 MMHG | HEART RATE: 72 BPM | TEMPERATURE: 98 F | RESPIRATION RATE: 18 BRPM | SYSTOLIC BLOOD PRESSURE: 111 MMHG | OXYGEN SATURATION: 95 %

## 2021-08-21 LAB
ANION GAP SERPL CALC-SCNC: 14 MMOL/L — SIGNIFICANT CHANGE UP (ref 5–17)
BUN SERPL-MCNC: 15 MG/DL — SIGNIFICANT CHANGE UP (ref 7–23)
CALCIUM SERPL-MCNC: 9.6 MG/DL — SIGNIFICANT CHANGE UP (ref 8.4–10.5)
CHLORIDE SERPL-SCNC: 103 MMOL/L — SIGNIFICANT CHANGE UP (ref 96–108)
CO2 SERPL-SCNC: 23 MMOL/L — SIGNIFICANT CHANGE UP (ref 22–31)
CREAT SERPL-MCNC: 0.88 MG/DL — SIGNIFICANT CHANGE UP (ref 0.5–1.3)
GLUCOSE SERPL-MCNC: 111 MG/DL — HIGH (ref 70–99)
HCT VFR BLD CALC: 37.9 % — LOW (ref 39–50)
HGB BLD-MCNC: 11.9 G/DL — LOW (ref 13–17)
MAGNESIUM SERPL-MCNC: 2.2 MG/DL — SIGNIFICANT CHANGE UP (ref 1.6–2.6)
MCHC RBC-ENTMCNC: 19.9 PG — LOW (ref 27–34)
MCHC RBC-ENTMCNC: 31.4 GM/DL — LOW (ref 32–36)
MCV RBC AUTO: 63.4 FL — LOW (ref 80–100)
NRBC # BLD: 0 /100 WBCS — SIGNIFICANT CHANGE UP (ref 0–0)
PLATELET # BLD AUTO: 257 K/UL — SIGNIFICANT CHANGE UP (ref 150–400)
POTASSIUM SERPL-MCNC: 3.7 MMOL/L — SIGNIFICANT CHANGE UP (ref 3.5–5.3)
POTASSIUM SERPL-SCNC: 3.7 MMOL/L — SIGNIFICANT CHANGE UP (ref 3.5–5.3)
RBC # BLD: 5.98 M/UL — HIGH (ref 4.2–5.8)
RBC # FLD: 17.2 % — HIGH (ref 10.3–14.5)
SODIUM SERPL-SCNC: 140 MMOL/L — SIGNIFICANT CHANGE UP (ref 135–145)
WBC # BLD: 11.02 K/UL — HIGH (ref 3.8–10.5)
WBC # FLD AUTO: 11.02 K/UL — HIGH (ref 3.8–10.5)

## 2021-08-21 PROCEDURE — 85014 HEMATOCRIT: CPT

## 2021-08-21 PROCEDURE — 96374 THER/PROPH/DIAG INJ IV PUSH: CPT

## 2021-08-21 PROCEDURE — 82803 BLOOD GASES ANY COMBINATION: CPT

## 2021-08-21 PROCEDURE — 99239 HOSP IP/OBS DSCHRG MGMT >30: CPT

## 2021-08-21 PROCEDURE — 86769 SARS-COV-2 COVID-19 ANTIBODY: CPT

## 2021-08-21 PROCEDURE — 85730 THROMBOPLASTIN TIME PARTIAL: CPT

## 2021-08-21 PROCEDURE — 71045 X-RAY EXAM CHEST 1 VIEW: CPT

## 2021-08-21 PROCEDURE — 86140 C-REACTIVE PROTEIN: CPT

## 2021-08-21 PROCEDURE — 99285 EMERGENCY DEPT VISIT HI MDM: CPT | Mod: 25

## 2021-08-21 PROCEDURE — 82947 ASSAY GLUCOSE BLOOD QUANT: CPT

## 2021-08-21 PROCEDURE — 0225U NFCT DS DNA&RNA 21 SARSCOV2: CPT

## 2021-08-21 PROCEDURE — 80048 BASIC METABOLIC PNL TOTAL CA: CPT

## 2021-08-21 PROCEDURE — 85384 FIBRINOGEN ACTIVITY: CPT

## 2021-08-21 PROCEDURE — 84145 PROCALCITONIN (PCT): CPT

## 2021-08-21 PROCEDURE — 85379 FIBRIN DEGRADATION QUANT: CPT

## 2021-08-21 PROCEDURE — U0003: CPT

## 2021-08-21 PROCEDURE — 84132 ASSAY OF SERUM POTASSIUM: CPT

## 2021-08-21 PROCEDURE — 84295 ASSAY OF SERUM SODIUM: CPT

## 2021-08-21 PROCEDURE — U0005: CPT

## 2021-08-21 PROCEDURE — 81001 URINALYSIS AUTO W/SCOPE: CPT

## 2021-08-21 PROCEDURE — 82728 ASSAY OF FERRITIN: CPT

## 2021-08-21 PROCEDURE — 83605 ASSAY OF LACTIC ACID: CPT

## 2021-08-21 PROCEDURE — 83615 LACTATE (LD) (LDH) ENZYME: CPT

## 2021-08-21 PROCEDURE — 80053 COMPREHEN METABOLIC PANEL: CPT

## 2021-08-21 PROCEDURE — 85025 COMPLETE CBC W/AUTO DIFF WBC: CPT

## 2021-08-21 PROCEDURE — 85610 PROTHROMBIN TIME: CPT

## 2021-08-21 PROCEDURE — 82565 ASSAY OF CREATININE: CPT

## 2021-08-21 PROCEDURE — 82435 ASSAY OF BLOOD CHLORIDE: CPT

## 2021-08-21 PROCEDURE — 83735 ASSAY OF MAGNESIUM: CPT

## 2021-08-21 PROCEDURE — 85018 HEMOGLOBIN: CPT

## 2021-08-21 PROCEDURE — 82330 ASSAY OF CALCIUM: CPT

## 2021-08-21 RX ORDER — CEPHALEXIN 500 MG
1 CAPSULE ORAL
Qty: 0 | Refills: 0 | DISCHARGE

## 2021-08-21 RX ADMIN — Medication 100 MILLIGRAM(S): at 01:11

## 2021-08-21 RX ADMIN — Medication 650 MILLIGRAM(S): at 01:10

## 2021-08-21 RX ADMIN — Medication 100 MILLIGRAM(S): at 00:07

## 2021-08-21 RX ADMIN — Medication 650 MILLIGRAM(S): at 01:40

## 2021-08-21 NOTE — CHART NOTE - NSCHARTNOTEFT_GEN_A_CORE
Pt medically stable for discharge as d/w Dr. Lloyd. Medication reconciliation reviewed and completed with attending.

## 2021-08-21 NOTE — PROGRESS NOTE ADULT - PROBLEM SELECTOR PLAN 1
Likely in the setting of rhinovirus   Sating 95% on room air at rest and 92% on ambulation   Supportive care, tylenol prn

## 2021-08-21 NOTE — PROGRESS NOTE ADULT - PROBLEM SELECTOR PLAN 2
with resulting obstruction/hydro and history of repairs. Last CT A/p this month without signs of hydro, with nonobstructing stones  - planning for Stent per patient on Monday with urology Dr. Dasilva

## 2021-08-21 NOTE — DISCHARGE NOTE NURSING/CASE MANAGEMENT/SOCIAL WORK - PATIENT PORTAL LINK FT
You can access the FollowMyHealth Patient Portal offered by [facility] by registering at the following website: [email or web address]. By joining [employer]’s FollowMyHealth portal, you will also be able to view your health information using other applications (apps) compatible with our system.

## 2021-08-21 NOTE — DISCHARGE NOTE NURSING/CASE MANAGEMENT/SOCIAL WORK - NSDCPEFALRISK_GEN_ALL_CORE
For information on Fall & injury Prevention, visit [email or web address]

## 2021-08-21 NOTE — PROGRESS NOTE ADULT - SUBJECTIVE AND OBJECTIVE BOX
Patient is a 26y old  Male who presents with a chief complaint of Fever + SOB (20 Aug 2021 12:11)      SUBJECTIVE / OVERNIGHT EVENTS: feels bettrer, cough and congestion are better, denies cp, sob    MEDICATIONS  (STANDING):  enoxaparin Injectable 40 milliGRAM(s) SubCutaneous daily    MEDICATIONS  (PRN):  guaiFENesin Oral Liquid (Sugar-Free) 100 milliGRAM(s) Oral every 6 hours PRN Cough        CAPILLARY BLOOD GLUCOSE        I&O's Summary    20 Aug 2021 07:01  -  21 Aug 2021 07:00  --------------------------------------------------------  IN: 240 mL / OUT: 0 mL / NET: 240 mL    21 Aug 2021 07:01  -  21 Aug 2021 13:55  --------------------------------------------------------  IN: 480 mL / OUT: 1 mL / NET: 479 mL        PHYSICAL EXAM:  GENERAL: NAD, well-developed  HEAD:  Atraumatic, Normocephalic  EYES: EOMI, PERRLA, conjunctiva and sclera clear  NECK: Supple, No JVD  CHEST/LUNG: Clear to auscultation bilaterally; No wheeze  HEART: Regular rate and rhythm; No murmurs, rubs, or gallops  ABDOMEN: Soft, Nontender, Nondistended; Bowel sounds present  EXTREMITIES:  2+ Peripheral Pulses, No clubbing, cyanosis, or edema  PSYCH: AAOx3      LABS:                        11.9   11.02 )-----------( 257      ( 21 Aug 2021 07:41 )             37.9     08-    140  |  103  |  15  ----------------------------<  111<H>  3.7   |  23  |  0.88    Ca    9.6      21 Aug 2021 07:33  Mg     2.2         TPro  7.7  /  Alb  4.9  /  TBili  0.8  /  DBili  x   /  AST  26  /  ALT  38  /  AlkPhos  68  08-20    PT/INR - ( 20 Aug 2021 02:24 )   PT: 13.4 sec;   INR: 1.12 ratio         PTT - ( 20 Aug 2021 02:24 )  PTT:32.1 sec      Urinalysis Basic - ( 20 Aug 2021 04:17 )    Color: Yellow / Appearance: Clear / S.029 / pH: x  Gluc: x / Ketone: Negative  / Bili: Negative / Urobili: 2 mg/dL   Blood: x / Protein: Trace / Nitrite: Negative   Leuk Esterase: Negative / RBC: 1 /hpf / WBC 2 /HPF   Sq Epi: x / Non Sq Epi: 1 /hpf / Bacteria: Negative        RADIOLOGY & ADDITIONAL TESTS:    Imaging Personally Reviewed:    Consultant(s) Notes Reviewed:      Care Discussed with Consultants/Other Providers:

## 2021-08-22 ENCOUNTER — TRANSCRIPTION ENCOUNTER (OUTPATIENT)
Age: 26
End: 2021-08-22

## 2021-08-23 ENCOUNTER — TRANSCRIPTION ENCOUNTER (OUTPATIENT)
Age: 26
End: 2021-08-23

## 2021-08-23 ENCOUNTER — OUTPATIENT (OUTPATIENT)
Dept: OUTPATIENT SERVICES | Facility: HOSPITAL | Age: 26
LOS: 1 days | End: 2021-08-23
Payer: MEDICAID

## 2021-08-23 ENCOUNTER — APPOINTMENT (OUTPATIENT)
Dept: UROLOGY | Facility: HOSPITAL | Age: 26
End: 2021-08-23

## 2021-08-23 VITALS
OXYGEN SATURATION: 100 % | RESPIRATION RATE: 15 BRPM | HEART RATE: 68 BPM | DIASTOLIC BLOOD PRESSURE: 68 MMHG | SYSTOLIC BLOOD PRESSURE: 105 MMHG

## 2021-08-23 VITALS
RESPIRATION RATE: 12 BRPM | HEIGHT: 67 IN | HEART RATE: 86 BPM | DIASTOLIC BLOOD PRESSURE: 75 MMHG | TEMPERATURE: 98 F | WEIGHT: 175.93 LBS | SYSTOLIC BLOOD PRESSURE: 128 MMHG | OXYGEN SATURATION: 96 %

## 2021-08-23 DIAGNOSIS — N13.0 HYDRONEPHROSIS WITH URETEROPELVIC JUNCTION OBSTRUCTION: Chronic | ICD-10-CM

## 2021-08-23 DIAGNOSIS — N13.30 UNSPECIFIED HYDRONEPHROSIS: ICD-10-CM

## 2021-08-23 PROCEDURE — C2617: CPT

## 2021-08-23 PROCEDURE — 87186 SC STD MICRODIL/AGAR DIL: CPT

## 2021-08-23 PROCEDURE — 52351 CYSTOURETERO & OR PYELOSCOPE: CPT | Mod: RT

## 2021-08-23 PROCEDURE — C1758: CPT

## 2021-08-23 PROCEDURE — C1769: CPT

## 2021-08-23 PROCEDURE — 76000 FLUOROSCOPY <1 HR PHYS/QHP: CPT

## 2021-08-23 PROCEDURE — C1766: CPT

## 2021-08-23 PROCEDURE — 87077 CULTURE AEROBIC IDENTIFY: CPT

## 2021-08-23 PROCEDURE — 52332 CYSTOSCOPY AND TREATMENT: CPT | Mod: RT

## 2021-08-23 PROCEDURE — 87086 URINE CULTURE/COLONY COUNT: CPT

## 2021-08-23 RX ORDER — HYDROMORPHONE HYDROCHLORIDE 2 MG/ML
1 INJECTION INTRAMUSCULAR; INTRAVENOUS; SUBCUTANEOUS
Refills: 0 | Status: DISCONTINUED | OUTPATIENT
Start: 2021-08-23 | End: 2021-08-23

## 2021-08-23 RX ORDER — TAMSULOSIN HYDROCHLORIDE 0.4 MG/1
1 CAPSULE ORAL
Qty: 7 | Refills: 0
Start: 2021-08-23 | End: 2021-08-29

## 2021-08-23 RX ORDER — DEXAMETHASONE 0.5 MG/5ML
8 ELIXIR ORAL ONCE
Refills: 0 | Status: DISCONTINUED | OUTPATIENT
Start: 2021-08-23 | End: 2021-08-23

## 2021-08-23 RX ORDER — ONDANSETRON 8 MG/1
4 TABLET, FILM COATED ORAL ONCE
Refills: 0 | Status: DISCONTINUED | OUTPATIENT
Start: 2021-08-23 | End: 2021-08-23

## 2021-08-23 RX ORDER — PHENAZOPYRIDINE HCL 100 MG
1 TABLET ORAL
Qty: 6 | Refills: 0
Start: 2021-08-23 | End: 2021-08-24

## 2021-08-23 RX ORDER — SODIUM CHLORIDE 9 MG/ML
3 INJECTION INTRAMUSCULAR; INTRAVENOUS; SUBCUTANEOUS EVERY 8 HOURS
Refills: 0 | Status: DISCONTINUED | OUTPATIENT
Start: 2021-08-23 | End: 2021-08-23

## 2021-08-23 RX ORDER — ACETAMINOPHEN 500 MG
1000 TABLET ORAL ONCE
Refills: 0 | Status: COMPLETED | OUTPATIENT
Start: 2021-08-23 | End: 2021-08-23

## 2021-08-23 RX ORDER — HYDROMORPHONE HYDROCHLORIDE 2 MG/ML
0.5 INJECTION INTRAMUSCULAR; INTRAVENOUS; SUBCUTANEOUS
Refills: 0 | Status: DISCONTINUED | OUTPATIENT
Start: 2021-08-23 | End: 2021-08-23

## 2021-08-23 RX ORDER — LIDOCAINE HCL 20 MG/ML
0.2 VIAL (ML) INJECTION ONCE
Refills: 0 | Status: DISCONTINUED | OUTPATIENT
Start: 2021-08-23 | End: 2021-08-23

## 2021-08-23 RX ADMIN — Medication 400 MILLIGRAM(S): at 14:13

## 2021-08-23 RX ADMIN — Medication 1000 MILLIGRAM(S): at 14:28

## 2021-08-23 NOTE — ASU DISCHARGE PLAN (ADULT/PEDIATRIC) - PROCEDURE
Cystoscopy, right ureteroscopy, stone extraction, stent insertion Cystoscopy, right ureteroscopy, stent insertion

## 2021-08-23 NOTE — BRIEF OPERATIVE NOTE - OPERATION/FINDINGS
Right flexible ureteroscopy performed over access sheath, no stones identified within renal collecting system. Single submucosal stone identified in upper pole calyx. [id number] ureteral stent inserted.

## 2021-08-23 NOTE — ASU DISCHARGE PLAN (ADULT/PEDIATRIC) - CARE PROVIDER_API CALL
Marv Dasilva  Urology  [address] [place] [address]  Phone: [phone]  Fax: [phone]  Follow Up Time:

## 2021-08-23 NOTE — PRE-ANESTHESIA EVALUATION ADULT - NSANTHADDINFOFT_GEN_ALL_CORE
+cough, non productive. History of recent mild URI. Will proceed with anesthesia but place ETT instead of LMA

## 2021-08-23 NOTE — BRIEF OPERATIVE NOTE - NSICDXBRIEFPROCEDURE_GEN_ALL_CORE_FT
PROCEDURES:  Flexible ureteroscopy with insertion of ureteral stent 23-Aug-2021 13:10:40 right Violette Tolbert

## 2021-08-23 NOTE — DISCHARGE NOTE NURSING/CASE MANAGEMENT/SOCIAL WORK - PATIENT PORTAL LINK FT
You can access the FollowMyHealth Patient Portal offered by [facility] by registering at the following website: [email or web address]. By joining [employer]’s FollowMyHealth portal, you will also be able to view your health information using other applications (apps) compatible with our system.

## 2021-08-23 NOTE — PRE-ANESTHESIA EVALUATION ADULT - TEMPERATURE IN CELSIUS (DEGREES C)
10ST PT NOTE:    Patient was not available for their therapy session at this time.  Would like some time to rest, requests writer return later this AM.      Re-Attempt Plan: Will re-attempt later today;Will re-attempt tomorrow (05/13/17 8445).   36.8

## 2021-08-23 NOTE — ASU DISCHARGE PLAN (ADULT/PEDIATRIC) - ASU DC SPECIAL INSTRUCTIONSFT
It is common to have blood in the urine after your procedure.  It may be pink or even red; inform your doctor if you have a significant amount of clots in the urine or if you are unable to void at all.  Be sure to drink plenty of fluids at all times.    -It is not uncommon to have some burning when you urinate or to even notice some stone fragments in your urine.  -You have an internal stent (a hollow tube that runs from the kidney to your bladder) after your procedure, helping your kidney drain down to your bladder after your surgery.  Some patients do not notice that they have a stent, while others complain of the sensation of needing to urinate frequently, burning on urination, or even some back pain (especially when they go to urinate). These sensations usually improve gradually, some faster than others. This is not uncommon, but may initially warrant the use of the pain medication which you were prescribed.  While the stent is in place, your urine may continue to be bloody. This stent is temporary and must be removed by your urologist as an outpatient.  -Provided that you are not restricted with fluids by your physician, you should drink 6-8 (8 oz.) glasses of fluid per day.  -You may resume your regular diet and regular medication regimen.    -You may shower or bathe.    -You may take over the counter pain medications such as Motrin and Tylenol as needed for pain.  Do not exceed 4 grams of Tylenol daily.  Each medication may be taken every 6 hours.  You may alternate these medications such that you take either one every 3 hours.  If you have severe pain that does not improve with the pain medication or you have persistent vomiting, call your doctor.  -You will have a prescription for an antibiotic when you go home.  Take this medication as instructed; if you miss one dose, resume taking it on your previous schedule until you have completed the entire course of treatment.  -As you have just underwent general anesthesia, you should refrain from driving, heavy lifting, smoking, alcohol consumption, or important decision making for the next 24 hours.  You may climb stairs and you may resume sexual activity.  -Call your physician if you have a fever over 101F.  -Make a follow up appointment for with your urologist when you arrive home (or the next business day).  -Call your urologist during normal business hours with any other routine questions. Call to schedule a follow up appointment for Friday 8/27/21 for stent removal.     Your ureteral stent is on a string attached with an adhesive bandage. Do not tug on or pull on string, do not remove bandage. The stent will be removed at your follow up appointment. If you notice that the string has been pulled out or if you see the stent coming out of your urethra, please contact your urologist.    It is common to have blood in the urine after your procedure.  It may be pink or even red; inform your doctor if you have a significant amount of clots in the urine or if you are unable to void at all.  Be sure to drink plenty of fluids at all times.    -It is not uncommon to have some burning when you urinate or to even notice some stone fragments in your urine.  -You have an internal stent (a hollow tube that runs from the kidney to your bladder) after your procedure, helping your kidney drain down to your bladder after your surgery.  Some patients do not notice that they have a stent, while others complain of the sensation of needing to urinate frequently, burning on urination, or even some back pain (especially when they go to urinate). These sensations usually improve gradually, some faster than others. This is not uncommon, but may initially warrant the use of the pain medication which you were prescribed.  While the stent is in place, your urine may continue to be bloody. This stent is temporary and must be removed by your urologist as an outpatient.  -Provided that you are not restricted with fluids by your physician, you should drink 6-8 (8 oz.) glasses of fluid per day.  -You may resume your regular diet and regular medication regimen.    -You may shower or bathe.    -You may take over the counter pain medications such as Motrin and Tylenol as needed for pain.  Do not exceed 4 grams of Tylenol daily.  Each medication may be taken every 6 hours.  You may alternate these medications such that you take either one every 3 hours.  If you have severe pain that does not improve with the pain medication or you have persistent vomiting, call your doctor.  -As you have just underwent general anesthesia, you should refrain from driving, heavy lifting, smoking, alcohol consumption, or important decision making for the next 24 hours.  You may climb stairs and you may resume sexual activity.  -Call your physician if you have a fever over 101F.  -Make a follow up appointment for with your urologist when you arrive home (or the next business day).  -Call your urologist during normal business hours with any other routine questions.

## 2021-08-25 LAB
CULTURE RESULTS: SIGNIFICANT CHANGE UP
SPECIMEN SOURCE: SIGNIFICANT CHANGE UP

## 2021-08-26 ENCOUNTER — NON-APPOINTMENT (OUTPATIENT)
Age: 26
End: 2021-08-26

## 2021-08-26 ENCOUNTER — INPATIENT (INPATIENT)
Facility: HOSPITAL | Age: 26
LOS: 3 days | Discharge: HOME | DRG: 690 | End: 2021-08-30
Attending: SPECIALIST | Admitting: SPECIALIST
Payer: MEDICAID

## 2021-08-26 VITALS
HEART RATE: 110 BPM | SYSTOLIC BLOOD PRESSURE: 137 MMHG | HEIGHT: 67 IN | OXYGEN SATURATION: 97 % | RESPIRATION RATE: 16 BRPM | TEMPERATURE: 103 F | DIASTOLIC BLOOD PRESSURE: 71 MMHG | WEIGHT: 175.05 LBS

## 2021-08-26 DIAGNOSIS — N13.0 HYDRONEPHROSIS WITH URETEROPELVIC JUNCTION OBSTRUCTION: Chronic | ICD-10-CM

## 2021-08-26 LAB
ALBUMIN SERPL ELPH-MCNC: 4.7 G/DL — SIGNIFICANT CHANGE UP (ref 3.3–5)
ALP SERPL-CCNC: 62 U/L — SIGNIFICANT CHANGE UP (ref 40–120)
ALT FLD-CCNC: 31 U/L — SIGNIFICANT CHANGE UP (ref 10–45)
ANION GAP SERPL CALC-SCNC: 14 MMOL/L — SIGNIFICANT CHANGE UP (ref 5–17)
AST SERPL-CCNC: 16 U/L — SIGNIFICANT CHANGE UP (ref 10–40)
BILIRUB SERPL-MCNC: 0.4 MG/DL — SIGNIFICANT CHANGE UP (ref 0.2–1.2)
BUN SERPL-MCNC: 15 MG/DL — SIGNIFICANT CHANGE UP (ref 7–23)
CALCIUM SERPL-MCNC: 10 MG/DL — SIGNIFICANT CHANGE UP (ref 8.4–10.5)
CHLORIDE SERPL-SCNC: 99 MMOL/L — SIGNIFICANT CHANGE UP (ref 96–108)
CO2 SERPL-SCNC: 25 MMOL/L — SIGNIFICANT CHANGE UP (ref 22–31)
CREAT SERPL-MCNC: 1.07 MG/DL — SIGNIFICANT CHANGE UP (ref 0.5–1.3)
GAS PNL BLDV: SIGNIFICANT CHANGE UP
GLUCOSE SERPL-MCNC: 90 MG/DL — SIGNIFICANT CHANGE UP (ref 70–99)
HCT VFR BLD CALC: 39.4 % — SIGNIFICANT CHANGE UP (ref 39–50)
HGB BLD-MCNC: 12.2 G/DL — LOW (ref 13–17)
MCHC RBC-ENTMCNC: 19.4 PG — LOW (ref 27–34)
MCHC RBC-ENTMCNC: 31 GM/DL — LOW (ref 32–36)
MCV RBC AUTO: 62.6 FL — LOW (ref 80–100)
PLATELET # BLD AUTO: 331 K/UL — SIGNIFICANT CHANGE UP (ref 150–400)
POTASSIUM SERPL-MCNC: 4.1 MMOL/L — SIGNIFICANT CHANGE UP (ref 3.5–5.3)
POTASSIUM SERPL-SCNC: 4.1 MMOL/L — SIGNIFICANT CHANGE UP (ref 3.5–5.3)
PROT SERPL-MCNC: 7.7 G/DL — SIGNIFICANT CHANGE UP (ref 6–8.3)
RBC # BLD: 6.29 M/UL — HIGH (ref 4.2–5.8)
RBC # FLD: 17.7 % — HIGH (ref 10.3–14.5)
SODIUM SERPL-SCNC: 138 MMOL/L — SIGNIFICANT CHANGE UP (ref 135–145)
WBC # BLD: 16.43 K/UL — HIGH (ref 3.8–10.5)
WBC # FLD AUTO: 16.43 K/UL — HIGH (ref 3.8–10.5)

## 2021-08-26 PROCEDURE — 99285 EMERGENCY DEPT VISIT HI MDM: CPT

## 2021-08-26 RX ORDER — SODIUM CHLORIDE 9 MG/ML
1000 INJECTION INTRAMUSCULAR; INTRAVENOUS; SUBCUTANEOUS ONCE
Refills: 0 | Status: COMPLETED | OUTPATIENT
Start: 2021-08-26 | End: 2021-08-26

## 2021-08-26 RX ORDER — KETOROLAC TROMETHAMINE 30 MG/ML
15 SYRINGE (ML) INJECTION ONCE
Refills: 0 | Status: DISCONTINUED | OUTPATIENT
Start: 2021-08-26 | End: 2021-08-26

## 2021-08-26 RX ORDER — MORPHINE SULFATE 50 MG/1
4 CAPSULE, EXTENDED RELEASE ORAL ONCE
Refills: 0 | Status: DISCONTINUED | OUTPATIENT
Start: 2021-08-26 | End: 2021-08-26

## 2021-08-26 RX ORDER — ACETAMINOPHEN 500 MG
975 TABLET ORAL ONCE
Refills: 0 | Status: COMPLETED | OUTPATIENT
Start: 2021-08-26 | End: 2021-08-26

## 2021-08-26 RX ORDER — HYDROMORPHONE HYDROCHLORIDE 2 MG/ML
1 INJECTION INTRAMUSCULAR; INTRAVENOUS; SUBCUTANEOUS ONCE
Refills: 0 | Status: DISCONTINUED | OUTPATIENT
Start: 2021-08-26 | End: 2021-08-26

## 2021-08-26 RX ORDER — CEFTRIAXONE 500 MG/1
1000 INJECTION, POWDER, FOR SOLUTION INTRAMUSCULAR; INTRAVENOUS ONCE
Refills: 0 | Status: COMPLETED | OUTPATIENT
Start: 2021-08-26 | End: 2021-08-26

## 2021-08-26 RX ADMIN — SODIUM CHLORIDE 1000 MILLILITER(S): 9 INJECTION INTRAMUSCULAR; INTRAVENOUS; SUBCUTANEOUS at 22:42

## 2021-08-26 RX ADMIN — Medication 975 MILLIGRAM(S): at 22:41

## 2021-08-26 RX ADMIN — HYDROMORPHONE HYDROCHLORIDE 1 MILLIGRAM(S): 2 INJECTION INTRAMUSCULAR; INTRAVENOUS; SUBCUTANEOUS at 23:35

## 2021-08-26 RX ADMIN — CEFTRIAXONE 100 MILLIGRAM(S): 500 INJECTION, POWDER, FOR SOLUTION INTRAMUSCULAR; INTRAVENOUS at 23:25

## 2021-08-26 RX ADMIN — MORPHINE SULFATE 4 MILLIGRAM(S): 50 CAPSULE, EXTENDED RELEASE ORAL at 23:01

## 2021-08-26 RX ADMIN — Medication 15 MILLIGRAM(S): at 22:42

## 2021-08-27 ENCOUNTER — APPOINTMENT (OUTPATIENT)
Dept: UROLOGY | Facility: CLINIC | Age: 26
End: 2021-08-27

## 2021-08-27 DIAGNOSIS — N39.0 URINARY TRACT INFECTION, SITE NOT SPECIFIED: ICD-10-CM

## 2021-08-27 LAB
ANION GAP SERPL CALC-SCNC: 14 MMOL/L — SIGNIFICANT CHANGE UP (ref 5–17)
APPEARANCE UR: ABNORMAL
APTT BLD: 31.5 SEC — SIGNIFICANT CHANGE UP (ref 27.5–35.5)
BASOPHILS # BLD AUTO: 0.05 K/UL — SIGNIFICANT CHANGE UP (ref 0–0.2)
BASOPHILS # BLD AUTO: 0.28 K/UL — HIGH (ref 0–0.2)
BASOPHILS NFR BLD AUTO: 0.3 % — SIGNIFICANT CHANGE UP (ref 0–2)
BASOPHILS NFR BLD AUTO: 1.7 % — SIGNIFICANT CHANGE UP (ref 0–2)
BILIRUB UR-MCNC: ABNORMAL
BUN SERPL-MCNC: 16 MG/DL — SIGNIFICANT CHANGE UP (ref 7–23)
CALCIUM SERPL-MCNC: 9.3 MG/DL — SIGNIFICANT CHANGE UP (ref 8.4–10.5)
CHLORIDE SERPL-SCNC: 101 MMOL/L — SIGNIFICANT CHANGE UP (ref 96–108)
CO2 SERPL-SCNC: 22 MMOL/L — SIGNIFICANT CHANGE UP (ref 22–31)
COLOR SPEC: ABNORMAL
CREAT SERPL-MCNC: 1.14 MG/DL — SIGNIFICANT CHANGE UP (ref 0.5–1.3)
DIFF PNL FLD: ABNORMAL
EOSINOPHIL # BLD AUTO: 0.3 K/UL — SIGNIFICANT CHANGE UP (ref 0–0.5)
EOSINOPHIL # BLD AUTO: 0.56 K/UL — HIGH (ref 0–0.5)
EOSINOPHIL NFR BLD AUTO: 1.7 % — SIGNIFICANT CHANGE UP (ref 0–6)
EOSINOPHIL NFR BLD AUTO: 3.4 % — SIGNIFICANT CHANGE UP (ref 0–6)
GLUCOSE SERPL-MCNC: 105 MG/DL — HIGH (ref 70–99)
GLUCOSE UR QL: NEGATIVE — SIGNIFICANT CHANGE UP
HCT VFR BLD CALC: 35.5 % — LOW (ref 39–50)
HGB BLD-MCNC: 11 G/DL — LOW (ref 13–17)
IMM GRANULOCYTES NFR BLD AUTO: 1.4 % — SIGNIFICANT CHANGE UP (ref 0–1.5)
INR BLD: 1.07 RATIO — SIGNIFICANT CHANGE UP (ref 0.88–1.16)
KETONES UR-MCNC: NEGATIVE — SIGNIFICANT CHANGE UP
LEUKOCYTE ESTERASE UR-ACNC: ABNORMAL
LYMPHOCYTES # BLD AUTO: 16.3 % — SIGNIFICANT CHANGE UP (ref 13–44)
LYMPHOCYTES # BLD AUTO: 16.8 % — SIGNIFICANT CHANGE UP (ref 13–44)
LYMPHOCYTES # BLD AUTO: 2.76 K/UL — SIGNIFICANT CHANGE UP (ref 1–3.3)
LYMPHOCYTES # BLD AUTO: 2.84 K/UL — SIGNIFICANT CHANGE UP (ref 1–3.3)
MCHC RBC-ENTMCNC: 19.4 PG — LOW (ref 27–34)
MCHC RBC-ENTMCNC: 31 GM/DL — LOW (ref 32–36)
MCV RBC AUTO: 62.5 FL — LOW (ref 80–100)
MONOCYTES # BLD AUTO: 1.1 K/UL — HIGH (ref 0–0.9)
MONOCYTES # BLD AUTO: 1.94 K/UL — HIGH (ref 0–0.9)
MONOCYTES NFR BLD AUTO: 11.2 % — SIGNIFICANT CHANGE UP (ref 2–14)
MONOCYTES NFR BLD AUTO: 6.7 % — SIGNIFICANT CHANGE UP (ref 2–14)
NEUTROPHILS # BLD AUTO: 11.73 K/UL — HIGH (ref 1.8–7.4)
NEUTROPHILS # BLD AUTO: 12.01 K/UL — HIGH (ref 1.8–7.4)
NEUTROPHILS NFR BLD AUTO: 69.1 % — SIGNIFICANT CHANGE UP (ref 43–77)
NEUTROPHILS NFR BLD AUTO: 71.4 % — SIGNIFICANT CHANGE UP (ref 43–77)
NITRITE UR-MCNC: POSITIVE
NRBC # BLD: 0 /100 WBCS — SIGNIFICANT CHANGE UP (ref 0–0)
PH UR: 6 — SIGNIFICANT CHANGE UP (ref 5–8)
PLATELET # BLD AUTO: 289 K/UL — SIGNIFICANT CHANGE UP (ref 150–400)
POTASSIUM SERPL-MCNC: 4.3 MMOL/L — SIGNIFICANT CHANGE UP (ref 3.5–5.3)
POTASSIUM SERPL-SCNC: 4.3 MMOL/L — SIGNIFICANT CHANGE UP (ref 3.5–5.3)
PROT UR-MCNC: 100 — SIGNIFICANT CHANGE UP
PROTHROM AB SERPL-ACNC: 12.8 SEC — SIGNIFICANT CHANGE UP (ref 10.6–13.6)
RBC # BLD: 5.68 M/UL — SIGNIFICANT CHANGE UP (ref 4.2–5.8)
RBC # FLD: 16.2 % — HIGH (ref 10.3–14.5)
SARS-COV-2 RNA SPEC QL NAA+PROBE: SIGNIFICANT CHANGE UP
SODIUM SERPL-SCNC: 137 MMOL/L — SIGNIFICANT CHANGE UP (ref 135–145)
SP GR SPEC: 1.02 — SIGNIFICANT CHANGE UP (ref 1.01–1.02)
UROBILINOGEN FLD QL: ABNORMAL
WBC # BLD: 17.39 K/UL — HIGH (ref 3.8–10.5)
WBC # FLD AUTO: 17.39 K/UL — HIGH (ref 3.8–10.5)

## 2021-08-27 PROCEDURE — 99232 SBSQ HOSP IP/OBS MODERATE 35: CPT

## 2021-08-27 PROCEDURE — 76770 US EXAM ABDO BACK WALL COMP: CPT | Mod: 26

## 2021-08-27 RX ORDER — ACETAMINOPHEN 500 MG
1000 TABLET ORAL ONCE
Refills: 0 | Status: COMPLETED | OUTPATIENT
Start: 2021-08-27 | End: 2021-08-27

## 2021-08-27 RX ORDER — HEPARIN SODIUM 5000 [USP'U]/ML
5000 INJECTION INTRAVENOUS; SUBCUTANEOUS EVERY 12 HOURS
Refills: 0 | Status: DISCONTINUED | OUTPATIENT
Start: 2021-08-27 | End: 2021-08-30

## 2021-08-27 RX ORDER — OXYCODONE HYDROCHLORIDE 5 MG/1
5 TABLET ORAL EVERY 4 HOURS
Refills: 0 | Status: DISCONTINUED | OUTPATIENT
Start: 2021-08-27 | End: 2021-08-30

## 2021-08-27 RX ORDER — MORPHINE SULFATE 50 MG/1
2 CAPSULE, EXTENDED RELEASE ORAL EVERY 4 HOURS
Refills: 0 | Status: DISCONTINUED | OUTPATIENT
Start: 2021-08-27 | End: 2021-08-30

## 2021-08-27 RX ORDER — PIPERACILLIN AND TAZOBACTAM 4; .5 G/20ML; G/20ML
3.38 INJECTION, POWDER, LYOPHILIZED, FOR SOLUTION INTRAVENOUS EVERY 8 HOURS
Refills: 0 | Status: DISCONTINUED | OUTPATIENT
Start: 2021-08-27 | End: 2021-08-30

## 2021-08-27 RX ORDER — ACETAMINOPHEN 500 MG
975 TABLET ORAL EVERY 6 HOURS
Refills: 0 | Status: DISCONTINUED | OUTPATIENT
Start: 2021-08-27 | End: 2021-08-30

## 2021-08-27 RX ORDER — OXYCODONE HYDROCHLORIDE 5 MG/1
10 TABLET ORAL EVERY 6 HOURS
Refills: 0 | Status: DISCONTINUED | OUTPATIENT
Start: 2021-08-27 | End: 2021-08-30

## 2021-08-27 RX ORDER — ONDANSETRON 8 MG/1
4 TABLET, FILM COATED ORAL EVERY 6 HOURS
Refills: 0 | Status: DISCONTINUED | OUTPATIENT
Start: 2021-08-27 | End: 2021-08-30

## 2021-08-27 RX ORDER — PIPERACILLIN AND TAZOBACTAM 4; .5 G/20ML; G/20ML
3.38 INJECTION, POWDER, LYOPHILIZED, FOR SOLUTION INTRAVENOUS ONCE
Refills: 0 | Status: COMPLETED | OUTPATIENT
Start: 2021-08-27 | End: 2021-08-27

## 2021-08-27 RX ORDER — VANCOMYCIN HCL 1 G
1250 VIAL (EA) INTRAVENOUS EVERY 12 HOURS
Refills: 0 | Status: DISCONTINUED | OUTPATIENT
Start: 2021-08-27 | End: 2021-08-29

## 2021-08-27 RX ORDER — SODIUM CHLORIDE 9 MG/ML
1000 INJECTION, SOLUTION INTRAVENOUS
Refills: 0 | Status: DISCONTINUED | OUTPATIENT
Start: 2021-08-27 | End: 2021-08-28

## 2021-08-27 RX ORDER — SENNA PLUS 8.6 MG/1
2 TABLET ORAL AT BEDTIME
Refills: 0 | Status: DISCONTINUED | OUTPATIENT
Start: 2021-08-27 | End: 2021-08-30

## 2021-08-27 RX ORDER — OXYCODONE HYDROCHLORIDE 5 MG/1
5 TABLET ORAL ONCE
Refills: 0 | Status: DISCONTINUED | OUTPATIENT
Start: 2021-08-27 | End: 2021-08-27

## 2021-08-27 RX ADMIN — Medication 400 MILLIGRAM(S): at 08:15

## 2021-08-27 RX ADMIN — OXYCODONE HYDROCHLORIDE 5 MILLIGRAM(S): 5 TABLET ORAL at 05:55

## 2021-08-27 RX ADMIN — PIPERACILLIN AND TAZOBACTAM 25 GRAM(S): 4; .5 INJECTION, POWDER, LYOPHILIZED, FOR SOLUTION INTRAVENOUS at 05:08

## 2021-08-27 RX ADMIN — Medication 400 MILLIGRAM(S): at 04:39

## 2021-08-27 RX ADMIN — OXYCODONE HYDROCHLORIDE 5 MILLIGRAM(S): 5 TABLET ORAL at 06:25

## 2021-08-27 RX ADMIN — MORPHINE SULFATE 2 MILLIGRAM(S): 50 CAPSULE, EXTENDED RELEASE ORAL at 20:38

## 2021-08-27 RX ADMIN — OXYCODONE HYDROCHLORIDE 5 MILLIGRAM(S): 5 TABLET ORAL at 08:10

## 2021-08-27 RX ADMIN — Medication 1000 MILLIGRAM(S): at 05:09

## 2021-08-27 RX ADMIN — OXYCODONE HYDROCHLORIDE 5 MILLIGRAM(S): 5 TABLET ORAL at 08:40

## 2021-08-27 RX ADMIN — Medication 975 MILLIGRAM(S): at 22:33

## 2021-08-27 RX ADMIN — OXYCODONE HYDROCHLORIDE 10 MILLIGRAM(S): 5 TABLET ORAL at 17:14

## 2021-08-27 RX ADMIN — HEPARIN SODIUM 5000 UNIT(S): 5000 INJECTION INTRAVENOUS; SUBCUTANEOUS at 05:08

## 2021-08-27 RX ADMIN — OXYCODONE HYDROCHLORIDE 5 MILLIGRAM(S): 5 TABLET ORAL at 13:15

## 2021-08-27 RX ADMIN — HEPARIN SODIUM 5000 UNIT(S): 5000 INJECTION INTRAVENOUS; SUBCUTANEOUS at 16:47

## 2021-08-27 RX ADMIN — OXYCODONE HYDROCHLORIDE 10 MILLIGRAM(S): 5 TABLET ORAL at 23:02

## 2021-08-27 RX ADMIN — MORPHINE SULFATE 2 MILLIGRAM(S): 50 CAPSULE, EXTENDED RELEASE ORAL at 20:08

## 2021-08-27 RX ADMIN — OXYCODONE HYDROCHLORIDE 5 MILLIGRAM(S): 5 TABLET ORAL at 13:45

## 2021-08-27 RX ADMIN — Medication 400 MILLIGRAM(S): at 16:00

## 2021-08-27 RX ADMIN — PIPERACILLIN AND TAZOBACTAM 200 GRAM(S): 4; .5 INJECTION, POWDER, LYOPHILIZED, FOR SOLUTION INTRAVENOUS at 01:09

## 2021-08-27 RX ADMIN — Medication 166.67 MILLIGRAM(S): at 19:50

## 2021-08-27 RX ADMIN — PIPERACILLIN AND TAZOBACTAM 25 GRAM(S): 4; .5 INJECTION, POWDER, LYOPHILIZED, FOR SOLUTION INTRAVENOUS at 13:16

## 2021-08-27 RX ADMIN — OXYCODONE HYDROCHLORIDE 10 MILLIGRAM(S): 5 TABLET ORAL at 22:32

## 2021-08-27 RX ADMIN — OXYCODONE HYDROCHLORIDE 10 MILLIGRAM(S): 5 TABLET ORAL at 16:44

## 2021-08-27 RX ADMIN — PIPERACILLIN AND TAZOBACTAM 25 GRAM(S): 4; .5 INJECTION, POWDER, LYOPHILIZED, FOR SOLUTION INTRAVENOUS at 22:33

## 2021-08-27 NOTE — ED PROVIDER NOTE - OBJECTIVE STATEMENT
Attendinyo male presents with fever.  Pt had kidney stone on the right side.  had procedure on monday to remove the stone and had stent placed on the right.  pt was supposed to have stent removed tomorrow in Dr. Dasilva's office, but spiked a fever to 104 today and has burning with urination and right flank pain similar to his kidney stone pain.  no vomiting but has some nausea.  no diarrhea.  tolerating po.  no respiratory complaints

## 2021-08-27 NOTE — ED ADULT NURSE NOTE - NS ED NURSE REPORT GIVEN TO FT
Report given to SHRUTHI Topete (ED holding). RN aware of PMH, reason for stay, and has no further questions.

## 2021-08-27 NOTE — ED PROVIDER NOTE - CLINICAL SUMMARY MEDICAL DECISION MAKING FREE TEXT BOX
Fever s/p urologic procedure.  likely UTI.  Will get UA, culture, CBC, CMP, give IVF and antibiotics.  will consult urology.  will reassess.

## 2021-08-27 NOTE — H&P ADULT - NSHPLABSRESULTS_GEN_ALL_CORE
12.2   16.43 )-----------( 331      ( 26 Aug 2021 23:15 )             39.4       08-26    138  |  99  |  15  ----------------------------<  90  4.1   |  25  |  1.07    Ca    10.0      26 Aug 2021 23:15    TPro  7.7  /  Alb  4.7  /  TBili  0.4  /  DBili  x   /  AST  16  /  ALT  31  /  AlkPhos  62  08-26    UA pending    CT abd/pelvis pending

## 2021-08-27 NOTE — H&P ADULT - ASSESSMENT
27yo Male PMH hx of bilateral UPJ obstruction s/p pyeloplasty - left repaired Dec 2016 and right May 2017 and nephrolithiasis, s/p R URS with stent placement on 8/23 with Dr. Dasilva, presents for R flank pain and fevers x 1 day. Labs notable for WBC 16.4, H/H 12.2/39.4, Cr 1.07.  -admit to urology  -zosyn  -f/u CT abd/pelvis  -f/u blood/urine cx  -IVF  -analgesia/antipyretics prn  -trend fevers  -discussed with Dr. Kulkarni and Dr. Kwan

## 2021-08-27 NOTE — PROGRESS NOTE ADULT - ASSESSMENT
27yo Male PMH hx of bilateral UPJ obstruction s/p pyeloplasty - left repaired Dec 2016 and right May 2017 and nephrolithiasis, s/p R URS with stent placement on 8/23 with Dr. Dasilva, presents for R flank pain and fevers x 1 day. Labs notable for WBC 16.4, H/H 12.2/39.4, Cr 1.07.    -KUB today to assess stent positioning  -continue zosyn  -f/u blood and urine cultures  -analgesia as needed  -monitor I's and O's 27yo Male PMH hx of bilateral UPJ obstruction s/p pyeloplasty - left repaired Dec 2016 and right May 2017 and nephrolithiasis, s/p R URS with stent placement on 8/23 with Dr. Dasilva, presents for R flank pain and fevers x 1 day. Labs notable for WBC 16.4, H/H 12.2/39.4, Cr 1.07.    -continue zosyn  -f/u blood and urine cultures  -analgesia as needed  -monitor I's and O's  -remove stent after afebrile x24hrs

## 2021-08-27 NOTE — ED ADULT NURSE NOTE - NSIMPLEMENTINTERV_GEN_ALL_ED
Implemented All Universal Safety Interventions:  [place] to call system. Call bell, personal items and telephone within reach. Instruct patient to call for assistance. Room bathroom lighting operational. Non-slip footwear when patient is off stretcher. Physically safe environment: no spills, clutter or unnecessary equipment. Stretcher in lowest position, wheels locked, appropriate side rails in place.

## 2021-08-27 NOTE — ED PROVIDER NOTE - CPE EDP PSYCH NORM
After Visit Summary   4/5/2018    Nicole Phoenix    MRN: [id number]           Patient Information     Date Of Birth          1979        Visit Information        Provider Department      4/5/2018 2:40 PM Cynthia Mukherjee APRN [facility]        Today's Diagnoses     Screening for cervical cancer    -  1    Leukocytosis, unspecified type          Care Instructions    Increase your Effexor to 75 mg daily     Keep the Remeron dose the same.     Labs will be done today.   For normal results, you will receive a letter with the results in about 2 weeks.  If anything is abnormal or unexpected, someone from the clinic will call you.      Follow up in 1-2 months for recheck.     Preventive Health Recommendations  Female Ages 26 - 39  Yearly exam:   See your health care provider every year in order to    Review health changes.     Discuss preventive care.      Review your medicines if you your doctor has prescribed any.    Until age 30: Get a Pap test every three years (more often if you have had an abnormal result).    After age 30: Talk to your doctor about whether you should have a Pap test every 3 years or have a Pap test with HPV screening every 5 years.   You do not need a Pap test if your uterus was removed (hysterectomy) and you have not had cancer.  You should be tested each year for STDs (sexually transmitted diseases), if you're at risk.   Talk to your provider about how often to have your cholesterol checked.  If you are at risk for diabetes, you should have a diabetes test (fasting glucose).  Shots: Get a flu shot each year. Get a tetanus shot every 10 years.   Nutrition:     Eat at least 5 servings of fruits and vegetables each day.    Eat whole-grain bread, whole-wheat pasta and brown rice instead of white grains and rice.    Talk to your provider about Calcium and Vitamin D.     Lifestyle    Exercise at least 150 minutes a week (30 minutes a day, 5 days of the week). This  "will help you control your weight and prevent disease.    Limit alcohol to one drink per day.    No smoking.     Wear sunscreen to prevent skin cancer.    See your dentist every six months for an exam and cleaning.            Follow-ups after your visit        Who to contact     If you have questions or need follow up information about today's clinic visit or your schedule please contact [facility] directly at [phone].  Normal or non-critical lab and imaging results will be communicated to you by Propagenixhart, letter or phone within 4 business days after the clinic has received the results. If you do not hear from us within 7 days, please contact the clinic through Propagenixhart or phone. If you have a critical or abnormal lab result, we will notify you by phone as soon as possible.  Submit refill requests through Scribz or call your pharmacy and they will forward the refill request to us. Please allow 3 business days for your refill to be completed.          Additional Information About Your Visit        PropagenixharTarari Information     Scribz lets you send messages to your doctor, view your test results, renew your prescriptions, schedule appointments and more. To sign up, go to [email or web address] . Click on \"Log in\" on the left side of the screen, which will take you to the Welcome page. Then click on \"Sign up Now\" on the right side of the page.     You will be asked to enter the access code listed below, as well as some personal information. Please follow the directions to create your username and password.     Your access code is: [email or web address]  Expires: 2018 12:15 PM     Your access code will  in 90 days. If you need help or a new code, please call your [place] clinic or [phone].        Care EveryWhere ID     This is your Care EveryWhere ID. This could be used by other organizations to access your [place] medical records  [id number]        Your Vitals Were     Pulse Temperature " "Respirations Height Last Period Pulse Oximetry    112 98.5  F (36.9  C) (Tympanic) 16 5' 2\" (1.575 m) (LMP Unknown) 99%    Breastfeeding? BMI (Body Mass Index)                No 25.42 kg/m2           Blood Pressure from Last 3 Encounters:   04/05/18 104/60   03/08/18 122/80   05/31/17 100/62    Weight from Last 3 Encounters:   04/05/18 139 lb (63 kg)   03/08/18 131 lb 4.8 oz (59.6 kg)   05/31/17 130 lb 12.8 oz (59.3 kg)              We Performed the Following     CBC with platelets     HPV High Risk Types DNA Cervical     Pap imaged thin layer screen with HPV - recommended age 30 - 65        Primary Care Provider Office Phone # Fax #    Cynthia BradenGINA lee [phone]       [address] [facility] [address]        Equal Access to Services     AFSHAN PINEDO : Hadii aad ku hadasho Soomaali, waaxda luqadaha, qaybta kaalmada adeegyada, waxay idiin haymariannan ashley mcpherson . So [facility] [phone].    ATENCIÓN: Si habla español, tiene a bai disposición servicios gratuitos de asistencia lingüística. Jeffy al [phone].    We comply with applicable federal civil rights laws and [place] laws. We do not discriminate on the basis of race, color, national origin, age, disability, sex, sexual orientation, or gender identity.            Thank you!     Thank you for choosing [facility]  for your care. Our goal is always to provide you with excellent care. Hearing back from our patients is one way we can continue to improve our services. Please take a few minutes to complete the written survey that you may receive in the mail after your visit with us. Thank you!             Your Updated Medication List - Protect others around you: Learn how to safely use, store and throw away your medicines at www.disposemymeds.org.          This list is accurate as of 4/5/18  2:55 PM.  Always use your most recent med list.                   Brand Name Dispense Instructions for use Diagnosis    mirtazapine 15 " MG tablet    REMERON    30 tablet    Take 1 tablet (15 mg) by mouth At Bedtime    Severe episode of recurrent major depressive disorder, without psychotic features (H)       venlafaxine 37.5 MG 24 hr capsule    EFFEXOR-XR    30 capsule    Take 1 capsule (37.5 mg) by mouth daily    Severe episode of recurrent major depressive disorder, without psychotic features (H)          normal...

## 2021-08-27 NOTE — H&P ADULT - HISTORY OF PRESENT ILLNESS
25yo Male PMH hx of bilateral UPJ obstruction s/p pyeloplasty - left repaired Dec 2016 and right May 2017 and nephrolithiasis, s/p R URS with stent placement on 8/23 with Dr. Dasilva, presents for R flank pain and fevers x 1 day. Tmax at home 103.4F, in ED, 102.9. Patient endorses associated dysuria and sensation of retrograde urine flow. States he’s also had intermittent hematuria, but no clots and now his urine is orange from pyridium. Pt took Flomax at home with minimal relief. Denies cp, sob, nausea, vomiting. Admits to poor po intake today but states he is hungry now.  Labs notable for WBC 16.4, H/H 12.2/39.4, Cr 1.07.

## 2021-08-27 NOTE — ED ADULT NURSE NOTE - OBJECTIVE STATEMENT
26y male presents to ED via EMS complaining of urinary symptoms. AOx4, endorses procedure to removed kidney stones with stent placement on 8/23. Woke up today with increasing pelvic/lower back pain, chills, and fever at home tmax 103.5 degrees F. Upon assessment in ED abdomen is soft, slightly tender to palpation and non distended. Denies chest pain, SOB, N/V/D, diarrhea, bloody stool.

## 2021-08-27 NOTE — PROGRESS NOTE ADULT - SUBJECTIVE AND OBJECTIVE BOX
The patient was seen and examined at bedside.  The patient states that he has pain control issues overnight and wants something stronger.  Denies complaints of chest pain, shortness of breath, nausea.  No acute events overnight.    T(C): 38.3 (08-27-21 @ 05:18), Max: 39.4 (08-26-21 @ 22:23)  HR: 99 (08-27-21 @ 05:18) (83 - 110)  BP: 116/68 (08-27-21 @ 05:18) (103/64 - 137/71)  RR: 18 (08-27-21 @ 05:18) (16 - 18)  SpO2: 96% (08-27-21 @ 05:18) (96% - 99%)  Wt(kg): --    Physical Exam:    General: NAD, A+Ox3  Abdomen: soft, non-tender, non-distended

## 2021-08-27 NOTE — H&P ADULT - NSHPPHYSICALEXAM_GEN_ALL_CORE
General: NAD, Lying in bed comfortably  Neuro: A+Ox3, no focal deficits  Resp: No respiratory distress or accessory muscle use. no supplemental O2  Abd: Soft, +ttp RLQ, ND, no rebound/guarding  Back: (+) R CVAT  : voiding  Vascular: All 4 extremities warm and appear well perfused  Skin: Intact, no breakdown  Musculoskeletal: All 4 extremities moving spontaneously, no limitations.

## 2021-08-27 NOTE — ED ADULT NURSE NOTE - CHIEF COMPLAINT QUOTE
s/p procedure for kidney stone removal and stent placement (8/23); now c/o chills, back pain and dysuria

## 2021-08-28 LAB
ANION GAP SERPL CALC-SCNC: 12 MMOL/L — SIGNIFICANT CHANGE UP (ref 5–17)
BUN SERPL-MCNC: 10 MG/DL — SIGNIFICANT CHANGE UP (ref 7–23)
CALCIUM SERPL-MCNC: 9.6 MG/DL — SIGNIFICANT CHANGE UP (ref 8.4–10.5)
CHLORIDE SERPL-SCNC: 101 MMOL/L — SIGNIFICANT CHANGE UP (ref 96–108)
CO2 SERPL-SCNC: 23 MMOL/L — SIGNIFICANT CHANGE UP (ref 22–31)
COVID-19 SPIKE DOMAIN AB INTERP: NEGATIVE — SIGNIFICANT CHANGE UP
COVID-19 SPIKE DOMAIN ANTIBODY RESULT: 0.4 U/ML — SIGNIFICANT CHANGE UP
CREAT SERPL-MCNC: 0.97 MG/DL — SIGNIFICANT CHANGE UP (ref 0.5–1.3)
CULTURE RESULTS: SIGNIFICANT CHANGE UP
GLUCOSE SERPL-MCNC: 86 MG/DL — SIGNIFICANT CHANGE UP (ref 70–99)
HCT VFR BLD CALC: 37.8 % — LOW (ref 39–50)
HGB BLD-MCNC: 11.4 G/DL — LOW (ref 13–17)
MCHC RBC-ENTMCNC: 19.2 PG — LOW (ref 27–34)
MCHC RBC-ENTMCNC: 30.2 GM/DL — LOW (ref 32–36)
MCV RBC AUTO: 63.7 FL — LOW (ref 80–100)
NRBC # BLD: 0 /100 WBCS — SIGNIFICANT CHANGE UP (ref 0–0)
PLATELET # BLD AUTO: 258 K/UL — SIGNIFICANT CHANGE UP (ref 150–400)
POTASSIUM SERPL-MCNC: 4.4 MMOL/L — SIGNIFICANT CHANGE UP (ref 3.5–5.3)
POTASSIUM SERPL-SCNC: 4.4 MMOL/L — SIGNIFICANT CHANGE UP (ref 3.5–5.3)
RBC # BLD: 5.93 M/UL — HIGH (ref 4.2–5.8)
RBC # FLD: 16.2 % — HIGH (ref 10.3–14.5)
SARS-COV-2 IGG+IGM SERPL QL IA: 0.4 U/ML — SIGNIFICANT CHANGE UP
SARS-COV-2 IGG+IGM SERPL QL IA: NEGATIVE — SIGNIFICANT CHANGE UP
SODIUM SERPL-SCNC: 136 MMOL/L — SIGNIFICANT CHANGE UP (ref 135–145)
SPECIMEN SOURCE: SIGNIFICANT CHANGE UP
WBC # BLD: 15.18 K/UL — HIGH (ref 3.8–10.5)
WBC # FLD AUTO: 15.18 K/UL — HIGH (ref 3.8–10.5)

## 2021-08-28 PROCEDURE — 99232 SBSQ HOSP IP/OBS MODERATE 35: CPT

## 2021-08-28 RX ORDER — POLYETHYLENE GLYCOL 3350 17 G/17G
17 POWDER, FOR SOLUTION ORAL DAILY
Refills: 0 | Status: DISCONTINUED | OUTPATIENT
Start: 2021-08-28 | End: 2021-08-30

## 2021-08-28 RX ORDER — ACETAMINOPHEN 500 MG
1000 TABLET ORAL ONCE
Refills: 0 | Status: COMPLETED | OUTPATIENT
Start: 2021-08-28 | End: 2021-08-28

## 2021-08-28 RX ADMIN — OXYCODONE HYDROCHLORIDE 5 MILLIGRAM(S): 5 TABLET ORAL at 22:32

## 2021-08-28 RX ADMIN — SENNA PLUS 2 TABLET(S): 8.6 TABLET ORAL at 13:24

## 2021-08-28 RX ADMIN — OXYCODONE HYDROCHLORIDE 10 MILLIGRAM(S): 5 TABLET ORAL at 19:40

## 2021-08-28 RX ADMIN — OXYCODONE HYDROCHLORIDE 5 MILLIGRAM(S): 5 TABLET ORAL at 22:02

## 2021-08-28 RX ADMIN — OXYCODONE HYDROCHLORIDE 5 MILLIGRAM(S): 5 TABLET ORAL at 13:53

## 2021-08-28 RX ADMIN — Medication 400 MILLIGRAM(S): at 19:15

## 2021-08-28 RX ADMIN — PIPERACILLIN AND TAZOBACTAM 25 GRAM(S): 4; .5 INJECTION, POWDER, LYOPHILIZED, FOR SOLUTION INTRAVENOUS at 05:33

## 2021-08-28 RX ADMIN — PIPERACILLIN AND TAZOBACTAM 25 GRAM(S): 4; .5 INJECTION, POWDER, LYOPHILIZED, FOR SOLUTION INTRAVENOUS at 13:18

## 2021-08-28 RX ADMIN — OXYCODONE HYDROCHLORIDE 10 MILLIGRAM(S): 5 TABLET ORAL at 19:10

## 2021-08-28 RX ADMIN — OXYCODONE HYDROCHLORIDE 10 MILLIGRAM(S): 5 TABLET ORAL at 06:04

## 2021-08-28 RX ADMIN — Medication 166.67 MILLIGRAM(S): at 09:19

## 2021-08-28 RX ADMIN — PIPERACILLIN AND TAZOBACTAM 25 GRAM(S): 4; .5 INJECTION, POWDER, LYOPHILIZED, FOR SOLUTION INTRAVENOUS at 22:02

## 2021-08-28 RX ADMIN — OXYCODONE HYDROCHLORIDE 10 MILLIGRAM(S): 5 TABLET ORAL at 05:34

## 2021-08-28 RX ADMIN — POLYETHYLENE GLYCOL 3350 17 GRAM(S): 17 POWDER, FOR SOLUTION ORAL at 18:16

## 2021-08-28 RX ADMIN — OXYCODONE HYDROCHLORIDE 5 MILLIGRAM(S): 5 TABLET ORAL at 13:23

## 2021-08-28 RX ADMIN — HEPARIN SODIUM 5000 UNIT(S): 5000 INJECTION INTRAVENOUS; SUBCUTANEOUS at 05:34

## 2021-08-28 RX ADMIN — OXYCODONE HYDROCHLORIDE 5 MILLIGRAM(S): 5 TABLET ORAL at 09:19

## 2021-08-28 RX ADMIN — HEPARIN SODIUM 5000 UNIT(S): 5000 INJECTION INTRAVENOUS; SUBCUTANEOUS at 18:16

## 2021-08-28 RX ADMIN — Medication 166.67 MILLIGRAM(S): at 19:10

## 2021-08-28 RX ADMIN — OXYCODONE HYDROCHLORIDE 5 MILLIGRAM(S): 5 TABLET ORAL at 09:49

## 2021-08-28 NOTE — PROGRESS NOTE ADULT - SUBJECTIVE AND OBJECTIVE BOX
Subjective  cont with flank pain, bearable with medication. tmax 103 at 1600.   cont to feel feverish with sweats   Objective    Vital signs  T(F): , Max: 103.2 (08-27-21 @ 16:00)  HR: 99 (08-28-21 @ 05:33)  BP: 127/78 (08-28-21 @ 05:33)  SpO2: 98% (08-28-21 @ 05:33)  Wt(kg): --    Output     08-27 @ 07:01  -  08-28 @ 07:00  --------------------------------------------------------  IN: 4260 mL / OUT: 2950 mL / NET: 1310 mL        Gen awake alert nad axox3 nontoxic appearing   Abd soft ntnd   Back no cvat bl    nonpalp bladder string in place     Labs      08-28 @ 06:46    WBC 15.18 / Hct 37.8  / SCr 0.97     08-27 @ 01:59    WBC 17.39 / Hct 35.5  / SCr 1.14       Urine Cx: Culture - Urine (08.27.21 @ 03:43)    Specimen Source: Clean Catch Clean Catch (Midstream)    Culture Results:   50,000 - 99,000 CFU/mL Gram positive organisms      Blood Cx: ?  Culture - Blood (08.27.21 @ 01:54)    Specimen Source: .Blood Blood-Peripheral    Culture Results:   No growth to date.      Imaging  sono report pending

## 2021-08-28 NOTE — PROGRESS NOTE ADULT - ASSESSMENT
25yo Male PMH hx of bilateral UPJ obstruction s/p pyeloplasty - left repaired Dec 2016 and right May 2017 and nephrolithiasis, s/p R URS with stent placement on 8/23 with Dr. Dasilva, presents for R flank pain and fevers x 1 day.     -continue zosyn  -f/u blood and urine cultures  -analgesia as needed  -monitor I's and O's  -remove stent after afebrile x24hrs

## 2021-08-29 LAB — VANCOMYCIN TROUGH SERPL-MCNC: 5.4 UG/ML — LOW (ref 10–20)

## 2021-08-29 PROCEDURE — 99232 SBSQ HOSP IP/OBS MODERATE 35: CPT

## 2021-08-29 RX ORDER — VANCOMYCIN HCL 1 G
1250 VIAL (EA) INTRAVENOUS EVERY 8 HOURS
Refills: 0 | Status: DISCONTINUED | OUTPATIENT
Start: 2021-08-29 | End: 2021-08-30

## 2021-08-29 RX ADMIN — OXYCODONE HYDROCHLORIDE 10 MILLIGRAM(S): 5 TABLET ORAL at 20:28

## 2021-08-29 RX ADMIN — HEPARIN SODIUM 5000 UNIT(S): 5000 INJECTION INTRAVENOUS; SUBCUTANEOUS at 06:29

## 2021-08-29 RX ADMIN — Medication 166.67 MILLIGRAM(S): at 08:23

## 2021-08-29 RX ADMIN — PIPERACILLIN AND TAZOBACTAM 25 GRAM(S): 4; .5 INJECTION, POWDER, LYOPHILIZED, FOR SOLUTION INTRAVENOUS at 21:29

## 2021-08-29 RX ADMIN — PIPERACILLIN AND TAZOBACTAM 25 GRAM(S): 4; .5 INJECTION, POWDER, LYOPHILIZED, FOR SOLUTION INTRAVENOUS at 13:09

## 2021-08-29 RX ADMIN — PIPERACILLIN AND TAZOBACTAM 25 GRAM(S): 4; .5 INJECTION, POWDER, LYOPHILIZED, FOR SOLUTION INTRAVENOUS at 06:29

## 2021-08-29 RX ADMIN — MORPHINE SULFATE 2 MILLIGRAM(S): 50 CAPSULE, EXTENDED RELEASE ORAL at 22:41

## 2021-08-29 RX ADMIN — OXYCODONE HYDROCHLORIDE 10 MILLIGRAM(S): 5 TABLET ORAL at 13:08

## 2021-08-29 RX ADMIN — OXYCODONE HYDROCHLORIDE 10 MILLIGRAM(S): 5 TABLET ORAL at 01:23

## 2021-08-29 RX ADMIN — Medication 166.67 MILLIGRAM(S): at 17:43

## 2021-08-29 RX ADMIN — OXYCODONE HYDROCHLORIDE 10 MILLIGRAM(S): 5 TABLET ORAL at 01:53

## 2021-08-29 RX ADMIN — OXYCODONE HYDROCHLORIDE 10 MILLIGRAM(S): 5 TABLET ORAL at 13:28

## 2021-08-29 RX ADMIN — MORPHINE SULFATE 2 MILLIGRAM(S): 50 CAPSULE, EXTENDED RELEASE ORAL at 23:30

## 2021-08-29 RX ADMIN — POLYETHYLENE GLYCOL 3350 17 GRAM(S): 17 POWDER, FOR SOLUTION ORAL at 11:01

## 2021-08-29 RX ADMIN — OXYCODONE HYDROCHLORIDE 10 MILLIGRAM(S): 5 TABLET ORAL at 21:44

## 2021-08-29 NOTE — PROVIDER CONTACT NOTE (MEDICATION) - ACTION/TREATMENT ORDERED:
Urology team notified, Damian CANCINO made aware, educated on the benefits of heparin, encourage SCD usage, encourage ambulation, will continue to monitor

## 2021-08-29 NOTE — PROVIDER CONTACT NOTE (MEDICATION) - SITUATION
Pt is refusing subcutaneous heparin. Pt educated on benefits of subcutaneous heparin but continues to refuse

## 2021-08-29 NOTE — PROGRESS NOTE ADULT - SUBJECTIVE AND OBJECTIVE BOX
UROLOGY DAILY PROGRESS NOTE:     Subjective: Patient seen and examined at bedside. With fever overnight, tmax 102.4F @ 1900    Objective:  Vital signs  T(F): , Max: 102.4 (08-28-21 @ 19:00)  HR: 66 (08-29-21 @ 01:20)  BP: 132/81 (08-29-21 @ 01:20)  SpO2: 96% (08-29-21 @ 01:20)  Wt(kg): --    I&O's Summary    27 Aug 2021 07:01  -  28 Aug 2021 07:00  --------------------------------------------------------  IN: 4260 mL / OUT: 2950 mL / NET: 1310 mL    28 Aug 2021 07:01  -  29 Aug 2021 06:20  --------------------------------------------------------  IN: 3055 mL / OUT: 3450 mL / NET: -395 mL        Gen: NAD  Pulm: No respiratory distress, no subcostal retractions  CV: RRR, no JVD  Abd: Soft, NT, ND  :    Labs:  08-28  15.18 / 37.8  /0.97                           11.4   15.18 )-----------( 258      ( 28 Aug 2021 06:46 )             37.8     08-28    136  |  101  |  10  ----------------------------<  86  4.4   |  23  |  0.97    Ca    9.6      28 Aug 2021 06:46          Urine Cx: Culture - Urine (08.27.21 @ 03:43)    Specimen Source: Clean Catch Clean Catch (Midstream)    Culture Results:   50,000 - 99,000 CFU/mL Coag Negative Staphylococcus  "Susceptibilities not performed"    Culture - Blood (08.27.21 @ 01:54)    Specimen Source: .Blood Blood-Peripheral    Culture Results:   No growth to date.    Culture - Blood (08.27.21 @ 01:54)    Specimen Source: .Blood Blood-Peripheral    Culture Results:   No growth to date.    Culture - Urine (08.23.21 @ 18:47)    Specimen Source: OR Collect Bladder (from O.R.)    Culture Results:   Few Staphylococcus saprophyticus "Susceptibilities not performed"    < from: US Kidney and Bladder (08.27.21 @ 21:29) >  FINDINGS:    Right kidney: 14.2. Moderate hydroureteronephrosis. Stent is visualized in the proximal ureter. Nonobstructing lower pole calculus, measuring 0.3 cm. Interpolar cyst, measuring 1.0 x 0.8 x 0.7 cm.    Left kidney: 14.4 cm.Moderate hydroureteronephrosis.    Urinary bladder: Partially imaged stent. Minimal layering debris.    IMPRESSION:    Moderate bilateral hydroureteronephrosis.    --- End of Report ---      < end of copied text >     UROLOGY DAILY PROGRESS NOTE:     Subjective: Patient seen and examined at bedside. With fever overnight, tmax 102.4F @ 1900    Objective:  Vital signs  T(F): , Max: 102.4 (08-28-21 @ 19:00)  HR: 66 (08-29-21 @ 01:20)  BP: 132/81 (08-29-21 @ 01:20)  SpO2: 96% (08-29-21 @ 01:20)  Wt(kg): --    I&O's Summary    27 Aug 2021 07:01  -  28 Aug 2021 07:00  --------------------------------------------------------  IN: 4260 mL / OUT: 2950 mL / NET: 1310 mL    28 Aug 2021 07:01  -  29 Aug 2021 06:20  --------------------------------------------------------  IN: 3055 mL / OUT: 3450 mL / NET: -395 mL        Gen: NAD  Pulm: No respiratory distress, no subcostal retractions  CV: RRR, no JVD  Abd: Soft, NT, ND  : string in place, mild b/l CVA tenderness    Labs:  08-28  15.18 / 37.8  /0.97                           11.4   15.18 )-----------( 258      ( 28 Aug 2021 06:46 )             37.8     08-28    136  |  101  |  10  ----------------------------<  86  4.4   |  23  |  0.97    Ca    9.6      28 Aug 2021 06:46          Urine Cx: Culture - Urine (08.27.21 @ 03:43)    Specimen Source: Clean Catch Clean Catch (Midstream)    Culture Results:   50,000 - 99,000 CFU/mL Coag Negative Staphylococcus  "Susceptibilities not performed"    Culture - Blood (08.27.21 @ 01:54)    Specimen Source: .Blood Blood-Peripheral    Culture Results:   No growth to date.    Culture - Blood (08.27.21 @ 01:54)    Specimen Source: .Blood Blood-Peripheral    Culture Results:   No growth to date.    Culture - Urine (08.23.21 @ 18:47)    Specimen Source: OR Collect Bladder (from O.R.)    Culture Results:   Few Staphylococcus saprophyticus "Susceptibilities not performed"    < from:  Kidney and Bladder (08.27.21 @ 21:29) >  FINDINGS:    Right kidney: 14.2. Moderate hydroureteronephrosis. Stent is visualized in the proximal ureter. Nonobstructing lower pole calculus, measuring 0.3 cm. Interpolar cyst, measuring 1.0 x 0.8 x 0.7 cm.    Left kidney: 14.4 cm.Moderate hydroureteronephrosis.    Urinary bladder: Partially imaged stent. Minimal layering debris.    IMPRESSION:    Moderate bilateral hydroureteronephrosis.    --- End of Report ---      < end of copied text >

## 2021-08-29 NOTE — PROGRESS NOTE ADULT - ASSESSMENT
27yo Male PMH hx of bilateral UPJ obstruction s/p pyeloplasty - left repaired Dec 2016 and right May 2017 and nephrolithiasis, s/p R URS with stent placement on 8/23 with Dr. Dasilva, admitted 8/26 for R flank pain and fevers x 1 day. Fevers again last night, tmax 102.4F @ 1900    -continue zosyn, vanco  -f/u blood and urine cultures  -analgesia as needed  -monitor I's and O's  -remove stent after afebrile x24hrs 27yo Male PMH hx of bilateral UPJ obstruction s/p pyeloplasty - left repaired Dec 2016 and right May 2017 and nephrolithiasis, s/p R URS with stent placement on 8/23 with Dr. Dasilva, admitted 8/26 for R flank pain and fevers x 1 day. Fevers again last night, tmax 102.4F @ 1900    -continue zosyn, vanco; vanc trough low today, spoke to pharmacy and will switch to q8hr dosing  -f/u blood and urine cultures  -analgesia as needed  -monitor I's and O's  -remove stent after afebrile x24hrs

## 2021-08-30 ENCOUNTER — TRANSCRIPTION ENCOUNTER (OUTPATIENT)
Age: 26
End: 2021-08-30

## 2021-08-30 VITALS
TEMPERATURE: 99 F | HEART RATE: 69 BPM | SYSTOLIC BLOOD PRESSURE: 111 MMHG | RESPIRATION RATE: 18 BRPM | OXYGEN SATURATION: 95 % | DIASTOLIC BLOOD PRESSURE: 72 MMHG

## 2021-08-30 LAB
CULTURE RESULTS: SIGNIFICANT CHANGE UP
CULTURE RESULTS: SIGNIFICANT CHANGE UP
VANCOMYCIN TROUGH SERPL-MCNC: 22.5 UG/ML — HIGH (ref 10–20)

## 2021-08-30 PROCEDURE — 87077 CULTURE AEROBIC IDENTIFY: CPT

## 2021-08-30 PROCEDURE — 87086 URINE CULTURE/COLONY COUNT: CPT

## 2021-08-30 PROCEDURE — 96374 THER/PROPH/DIAG INJ IV PUSH: CPT

## 2021-08-30 PROCEDURE — 82803 BLOOD GASES ANY COMBINATION: CPT

## 2021-08-30 PROCEDURE — 87040 BLOOD CULTURE FOR BACTERIA: CPT

## 2021-08-30 PROCEDURE — 84132 ASSAY OF SERUM POTASSIUM: CPT

## 2021-08-30 PROCEDURE — U0003: CPT

## 2021-08-30 PROCEDURE — 99285 EMERGENCY DEPT VISIT HI MDM: CPT | Mod: 25

## 2021-08-30 PROCEDURE — 80202 ASSAY OF VANCOMYCIN: CPT

## 2021-08-30 PROCEDURE — 84295 ASSAY OF SERUM SODIUM: CPT

## 2021-08-30 PROCEDURE — 85025 COMPLETE CBC W/AUTO DIFF WBC: CPT

## 2021-08-30 PROCEDURE — 85018 HEMOGLOBIN: CPT

## 2021-08-30 PROCEDURE — 86769 SARS-COV-2 COVID-19 ANTIBODY: CPT

## 2021-08-30 PROCEDURE — 80048 BASIC METABOLIC PNL TOTAL CA: CPT

## 2021-08-30 PROCEDURE — 82330 ASSAY OF CALCIUM: CPT

## 2021-08-30 PROCEDURE — 80053 COMPREHEN METABOLIC PANEL: CPT

## 2021-08-30 PROCEDURE — 82435 ASSAY OF BLOOD CHLORIDE: CPT

## 2021-08-30 PROCEDURE — 85730 THROMBOPLASTIN TIME PARTIAL: CPT

## 2021-08-30 PROCEDURE — 85610 PROTHROMBIN TIME: CPT

## 2021-08-30 PROCEDURE — 76770 US EXAM ABDO BACK WALL COMP: CPT

## 2021-08-30 PROCEDURE — 96375 TX/PRO/DX INJ NEW DRUG ADDON: CPT

## 2021-08-30 PROCEDURE — 85014 HEMATOCRIT: CPT

## 2021-08-30 PROCEDURE — 87186 SC STD MICRODIL/AGAR DIL: CPT

## 2021-08-30 PROCEDURE — 82947 ASSAY GLUCOSE BLOOD QUANT: CPT

## 2021-08-30 PROCEDURE — U0005: CPT

## 2021-08-30 PROCEDURE — 81001 URINALYSIS AUTO W/SCOPE: CPT

## 2021-08-30 PROCEDURE — 83605 ASSAY OF LACTIC ACID: CPT

## 2021-08-30 PROCEDURE — 85027 COMPLETE CBC AUTOMATED: CPT

## 2021-08-30 RX ORDER — CIPROFLOXACIN LACTATE 400MG/40ML
1 VIAL (ML) INTRAVENOUS
Qty: 10 | Refills: 0
Start: 2021-08-30 | End: 2021-09-03

## 2021-08-30 RX ORDER — CEPHALEXIN 500 MG
1 CAPSULE ORAL
Qty: 10 | Refills: 0
Start: 2021-08-30 | End: 2021-09-03

## 2021-08-30 RX ORDER — VANCOMYCIN HCL 1 G
1000 VIAL (EA) INTRAVENOUS EVERY 8 HOURS
Refills: 0 | Status: DISCONTINUED | OUTPATIENT
Start: 2021-08-30 | End: 2021-08-30

## 2021-08-30 RX ADMIN — OXYCODONE HYDROCHLORIDE 10 MILLIGRAM(S): 5 TABLET ORAL at 05:52

## 2021-08-30 RX ADMIN — OXYCODONE HYDROCHLORIDE 10 MILLIGRAM(S): 5 TABLET ORAL at 06:50

## 2021-08-30 RX ADMIN — PIPERACILLIN AND TAZOBACTAM 25 GRAM(S): 4; .5 INJECTION, POWDER, LYOPHILIZED, FOR SOLUTION INTRAVENOUS at 06:22

## 2021-08-30 RX ADMIN — Medication 166.67 MILLIGRAM(S): at 01:00

## 2021-08-30 RX ADMIN — Medication 250 MILLIGRAM(S): at 10:23

## 2021-08-30 RX ADMIN — HEPARIN SODIUM 5000 UNIT(S): 5000 INJECTION INTRAVENOUS; SUBCUTANEOUS at 05:53

## 2021-08-30 NOTE — PROGRESS NOTE ADULT - ASSESSMENT
25yo Male PMH hx of bilateral UPJ obstruction s/p pyeloplasty - left repaired Dec 2016 and right May 2017 and nephrolithiasis, s/p R URS with stent placement on 8/23 with Dr. Dasilva, admitted 8/26 for R flank pain and fevers x 1 day. Afebrile >24 hours     -continue zosyn, vanco; vanc trough high today, dose changed to 1 gm q 8  -f/u blood and urine cultures  -analgesia as needed  -monitor I's and O's  -remove stent possibly today  -consider dc

## 2021-08-30 NOTE — DISCHARGE NOTE PROVIDER - NSDCMRMEDTOKEN_GEN_ALL_CORE_FT
cephalexin 500 mg oral tablet: 1 tab(s) orally 2 times a day    ciprofloxacin 500 mg oral tablet: 1 tab(s) orally 2 times a day MDD:2

## 2021-08-30 NOTE — DISCHARGE NOTE PROVIDER - HOSPITAL COURSE
32 y/o male admitted 8/26 with febrile UTI 8/26 after ureteroscopy 8/23.  PT intermittently spiked fever in house for a few days but responded  to IV abx.  ureteral stent on string removed intact at bedside after being afebrile for > 24hrs.  Pt stable for discharge.  BCx negative  UCx coag neg stap (speciation pending) 30 y/o male admitted 8/26 with febrile UTI 8/26 after ureteroscopy 8/23.  PT intermittently spiked fever in house for a few days but responded  to IV abx.  ureteral stent on string removed intact at bedside after being afebrile for > 24hrs.  Pt stable for discharge.  BCx negative  UCx staph saprophyticus

## 2021-08-30 NOTE — DISCHARGE NOTE NURSING/CASE MANAGEMENT/SOCIAL WORK - PATIENT PORTAL LINK FT
You can access the FollowMyHealth Patient Portal offered by [facility] by registering at the following website: [email or web address]. By joining [facility]’s FollowMyHealth portal, you will also be able to view your health information using other applications (apps) compatible with our system.

## 2021-08-30 NOTE — PROGRESS NOTE ADULT - SUBJECTIVE AND OBJECTIVE BOX
UROLOGY DAILY PROGRESS NOTE:     Subjective: Patient seen and examined at bedside. No acute events overnight   Patient feels well afebrile >24 hours       Objective:  Vital signs  T(F): , Max: 98.9 (21 @ 01:10)  HR: 67 (21 @ 06:17)  BP: 113/75 (21 @ 06:17)  SpO2: 96% (21 @ 06:17)  Wt(kg): --    Output     I&O's Detail    29 Aug 2021 07:01  -  30 Aug 2021 07:00  --------------------------------------------------------  IN:    Oral Fluid: 1220 mL  Total IN: 1220 mL    OUT:    Voided (mL): 3030 mL  Total OUT: 3030 mL    Total NET: -1810 mL          Physical Exam:  Gen: NAD  Abd: soft NTND   Back: NO CVAT  : voiding     Labs:          LABS/RADIOLOGY RESULTS:        Blood Cultures    Urinalysis Basic - ( 27 Aug 2021 00:34 )    Color: Dark Yellow / Appearance: Slightly Turbid / S.019 / pH:   Gluc:  / Ketone: Negative  / Bili: Moderate / Urobili: 4 mg/dL   Blood:  / Protein: 100 / Nitrite: Positive   Leuk Esterase: Large / RBC: 1088 /hpf /  /HPF   Sq Epi:  / Non Sq Epi: 0 /hpf / Bacteria: Negative                Urine Cx:

## 2021-08-30 NOTE — DISCHARGE NOTE PROVIDER - CARE PROVIDER_API CALL
Marv Dasilva  Urology  [address] [place] [address]  Phone: [phone]  Fax: [phone]  Established Patient  Follow Up Time: 2 weeks

## 2021-08-31 LAB
CULTURE RESULTS: SIGNIFICANT CHANGE UP
CULTURE RESULTS: SIGNIFICANT CHANGE UP

## 2021-09-01 LAB
-  AMOXICILLIN/CLAVULANIC ACID: SIGNIFICANT CHANGE UP
-  AMPICILLIN/SULBACTAM: SIGNIFICANT CHANGE UP
-  AMPICILLIN: SIGNIFICANT CHANGE UP
-  CEFAZOLIN: SIGNIFICANT CHANGE UP
-  CEFTRIAXONE: SIGNIFICANT CHANGE UP
-  CIPROFLOXACIN: SIGNIFICANT CHANGE UP
-  GENTAMICIN: SIGNIFICANT CHANGE UP
-  IMIPENEM: SIGNIFICANT CHANGE UP
-  LEVOFLOXACIN: SIGNIFICANT CHANGE UP
-  NITROFURANTOIN: SIGNIFICANT CHANGE UP
-  OXACILLIN: SIGNIFICANT CHANGE UP
-  PENICILLIN: SIGNIFICANT CHANGE UP
-  TETRACYCLINE: SIGNIFICANT CHANGE UP
-  TRIMETHOPRIM/SULFAMETHOXAZOLE: SIGNIFICANT CHANGE UP
-  VANCOMYCIN: SIGNIFICANT CHANGE UP
CULTURE RESULTS: SIGNIFICANT CHANGE UP
METHOD TYPE: SIGNIFICANT CHANGE UP
ORGANISM # SPEC MICROSCOPIC CNT: SIGNIFICANT CHANGE UP
ORGANISM # SPEC MICROSCOPIC CNT: SIGNIFICANT CHANGE UP
SPECIMEN SOURCE: SIGNIFICANT CHANGE UP
SPECIMEN SOURCE: SIGNIFICANT CHANGE UP

## 2022-02-27 ENCOUNTER — EMERGENCY (EMERGENCY)
Facility: HOSPITAL | Age: 27
LOS: 1 days | Discharge: HOME | End: 2022-02-27
Attending: EMERGENCY MEDICINE | Admitting: EMERGENCY MEDICINE
Payer: MEDICAID

## 2022-02-27 VITALS
OXYGEN SATURATION: 100 % | SYSTOLIC BLOOD PRESSURE: 123 MMHG | RESPIRATION RATE: 17 BRPM | DIASTOLIC BLOOD PRESSURE: 76 MMHG | HEART RATE: 75 BPM

## 2022-02-27 VITALS
HEIGHT: 67 IN | SYSTOLIC BLOOD PRESSURE: 130 MMHG | HEART RATE: 116 BPM | OXYGEN SATURATION: 100 % | RESPIRATION RATE: 16 BRPM | TEMPERATURE: 100 F | DIASTOLIC BLOOD PRESSURE: 79 MMHG

## 2022-02-27 DIAGNOSIS — N13.0 HYDRONEPHROSIS WITH URETEROPELVIC JUNCTION OBSTRUCTION: Chronic | ICD-10-CM

## 2022-02-27 DIAGNOSIS — N23 UNSPECIFIED RENAL COLIC: ICD-10-CM

## 2022-02-27 LAB
ALBUMIN SERPL ELPH-MCNC: 4.9 G/DL — SIGNIFICANT CHANGE UP (ref 3.3–5)
ALP SERPL-CCNC: 66 U/L — SIGNIFICANT CHANGE UP (ref 40–120)
ALT FLD-CCNC: 66 U/L — HIGH (ref 4–41)
ANION GAP SERPL CALC-SCNC: 15 MMOL/L — HIGH (ref 7–14)
APPEARANCE UR: CLEAR — SIGNIFICANT CHANGE UP
APTT BLD: 31.6 SEC — SIGNIFICANT CHANGE UP (ref 27–36.3)
AST SERPL-CCNC: 25 U/L — SIGNIFICANT CHANGE UP (ref 4–40)
BACTERIA # UR AUTO: NEGATIVE — SIGNIFICANT CHANGE UP
BASE EXCESS BLDV CALC-SCNC: -0.2 MMOL/L — SIGNIFICANT CHANGE UP (ref -2–3)
BASE EXCESS BLDV CALC-SCNC: -1.2 MMOL/L — SIGNIFICANT CHANGE UP (ref -2–3)
BASOPHILS # BLD AUTO: 0.01 K/UL — SIGNIFICANT CHANGE UP (ref 0–0.2)
BASOPHILS NFR BLD AUTO: 0.1 % — SIGNIFICANT CHANGE UP (ref 0–2)
BILIRUB SERPL-MCNC: 0.7 MG/DL — SIGNIFICANT CHANGE UP (ref 0.2–1.2)
BILIRUB UR-MCNC: NEGATIVE — SIGNIFICANT CHANGE UP
BLOOD GAS VENOUS COMPREHENSIVE RESULT: SIGNIFICANT CHANGE UP
BLOOD GAS VENOUS COMPREHENSIVE RESULT: SIGNIFICANT CHANGE UP
BUN SERPL-MCNC: 15 MG/DL — SIGNIFICANT CHANGE UP (ref 7–23)
CALCIUM SERPL-MCNC: 9.7 MG/DL — SIGNIFICANT CHANGE UP (ref 8.4–10.5)
CHLORIDE BLDV-SCNC: 100 MMOL/L — SIGNIFICANT CHANGE UP (ref 96–108)
CHLORIDE BLDV-SCNC: 99 MMOL/L — SIGNIFICANT CHANGE UP (ref 96–108)
CHLORIDE SERPL-SCNC: 96 MMOL/L — LOW (ref 98–107)
CO2 BLDV-SCNC: 26.2 MMOL/L — HIGH (ref 22–26)
CO2 BLDV-SCNC: 26.8 MMOL/L — HIGH (ref 22–26)
CO2 SERPL-SCNC: 22 MMOL/L — SIGNIFICANT CHANGE UP (ref 22–31)
COLOR SPEC: YELLOW — SIGNIFICANT CHANGE UP
CREAT SERPL-MCNC: 1.09 MG/DL — SIGNIFICANT CHANGE UP (ref 0.5–1.3)
DIFF PNL FLD: NEGATIVE — SIGNIFICANT CHANGE UP
EOSINOPHIL # BLD AUTO: 0.02 K/UL — SIGNIFICANT CHANGE UP (ref 0–0.5)
EOSINOPHIL NFR BLD AUTO: 0.2 % — SIGNIFICANT CHANGE UP (ref 0–6)
EPI CELLS # UR: 0 /HPF — SIGNIFICANT CHANGE UP (ref 0–5)
GAS PNL BLDV: 132 MMOL/L — LOW (ref 136–145)
GAS PNL BLDV: 133 MMOL/L — LOW (ref 136–145)
GLUCOSE BLDV-MCNC: 107 MG/DL — HIGH (ref 70–99)
GLUCOSE BLDV-MCNC: 113 MG/DL — HIGH (ref 70–99)
GLUCOSE SERPL-MCNC: 119 MG/DL — HIGH (ref 70–99)
GLUCOSE UR QL: NEGATIVE — SIGNIFICANT CHANGE UP
HCO3 BLDV-SCNC: 25 MMOL/L — SIGNIFICANT CHANGE UP (ref 22–29)
HCO3 BLDV-SCNC: 25 MMOL/L — SIGNIFICANT CHANGE UP (ref 22–29)
HCT VFR BLD CALC: 41.7 % — SIGNIFICANT CHANGE UP (ref 39–50)
HCT VFR BLDA CALC: 36 % — LOW (ref 39–51)
HCT VFR BLDA CALC: 39 % — SIGNIFICANT CHANGE UP (ref 39–51)
HGB BLD CALC-MCNC: 12 G/DL — LOW (ref 13–17)
HGB BLD CALC-MCNC: 13.1 G/DL — SIGNIFICANT CHANGE UP (ref 13–17)
HGB BLD-MCNC: 13 G/DL — SIGNIFICANT CHANGE UP (ref 13–17)
IANC: 9.34 K/UL — HIGH (ref 1.5–8.5)
IMM GRANULOCYTES NFR BLD AUTO: 0.5 % — SIGNIFICANT CHANGE UP (ref 0–1.5)
INR BLD: 1.18 RATIO — HIGH (ref 0.88–1.16)
KETONES UR-MCNC: NEGATIVE — SIGNIFICANT CHANGE UP
LACTATE BLDV-MCNC: 1 MMOL/L — SIGNIFICANT CHANGE UP (ref 0.5–2)
LACTATE BLDV-MCNC: 2.1 MMOL/L — HIGH (ref 0.5–2)
LEUKOCYTE ESTERASE UR-ACNC: NEGATIVE — SIGNIFICANT CHANGE UP
LYMPHOCYTES # BLD AUTO: 0.9 K/UL — LOW (ref 1–3.3)
LYMPHOCYTES # BLD AUTO: 8.2 % — LOW (ref 13–44)
MCHC RBC-ENTMCNC: 19.6 PG — LOW (ref 27–34)
MCHC RBC-ENTMCNC: 31.2 GM/DL — LOW (ref 32–36)
MCV RBC AUTO: 62.8 FL — LOW (ref 80–100)
MONOCYTES # BLD AUTO: 0.62 K/UL — SIGNIFICANT CHANGE UP (ref 0–0.9)
MONOCYTES NFR BLD AUTO: 5.7 % — SIGNIFICANT CHANGE UP (ref 2–14)
NEUTROPHILS # BLD AUTO: 9.34 K/UL — HIGH (ref 1.8–7.4)
NEUTROPHILS NFR BLD AUTO: 85.3 % — HIGH (ref 43–77)
NITRITE UR-MCNC: NEGATIVE — SIGNIFICANT CHANGE UP
NRBC # BLD: 0 /100 WBCS — SIGNIFICANT CHANGE UP
NRBC # FLD: 0 K/UL — SIGNIFICANT CHANGE UP
PCO2 BLDV: 44 MMHG — SIGNIFICANT CHANGE UP (ref 42–55)
PCO2 BLDV: 46 MMHG — SIGNIFICANT CHANGE UP (ref 42–55)
PH BLDV: 7.34 — SIGNIFICANT CHANGE UP (ref 7.32–7.43)
PH BLDV: 7.37 — SIGNIFICANT CHANGE UP (ref 7.32–7.43)
PH UR: 6 — SIGNIFICANT CHANGE UP (ref 5–8)
PLATELET # BLD AUTO: 264 K/UL — SIGNIFICANT CHANGE UP (ref 150–400)
PO2 BLDV: 28 MMHG — SIGNIFICANT CHANGE UP
PO2 BLDV: 37 MMHG — SIGNIFICANT CHANGE UP
POTASSIUM BLDV-SCNC: 3.6 MMOL/L — SIGNIFICANT CHANGE UP (ref 3.5–5.1)
POTASSIUM BLDV-SCNC: 3.8 MMOL/L — SIGNIFICANT CHANGE UP (ref 3.5–5.1)
POTASSIUM SERPL-MCNC: 4 MMOL/L — SIGNIFICANT CHANGE UP (ref 3.5–5.3)
POTASSIUM SERPL-SCNC: 4 MMOL/L — SIGNIFICANT CHANGE UP (ref 3.5–5.3)
PROT SERPL-MCNC: 7.9 G/DL — SIGNIFICANT CHANGE UP (ref 6–8.3)
PROT UR-MCNC: ABNORMAL
PROTHROM AB SERPL-ACNC: 13.7 SEC — HIGH (ref 10.5–13.4)
RBC # BLD: 6.64 M/UL — HIGH (ref 4.2–5.8)
RBC # FLD: 18.6 % — HIGH (ref 10.3–14.5)
RBC CASTS # UR COMP ASSIST: 1 /HPF — SIGNIFICANT CHANGE UP (ref 0–4)
SAO2 % BLDV: 49.3 % — SIGNIFICANT CHANGE UP
SAO2 % BLDV: 62.9 % — SIGNIFICANT CHANGE UP
SARS-COV-2 RNA SPEC QL NAA+PROBE: SIGNIFICANT CHANGE UP
SODIUM SERPL-SCNC: 133 MMOL/L — LOW (ref 135–145)
SP GR SPEC: 1.03 — SIGNIFICANT CHANGE UP (ref 1–1.05)
UROBILINOGEN FLD QL: SIGNIFICANT CHANGE UP
WBC # BLD: 10.95 K/UL — HIGH (ref 3.8–10.5)
WBC # FLD AUTO: 10.95 K/UL — HIGH (ref 3.8–10.5)
WBC UR QL: 1 /HPF — SIGNIFICANT CHANGE UP (ref 0–5)

## 2022-02-27 PROCEDURE — G1004: CPT

## 2022-02-27 PROCEDURE — 74176 CT ABD & PELVIS W/O CONTRAST: CPT | Mod: 26,MG

## 2022-02-27 PROCEDURE — 99285 EMERGENCY DEPT VISIT HI MDM: CPT

## 2022-02-27 RX ORDER — ACETAMINOPHEN 500 MG
975 TABLET ORAL ONCE
Refills: 0 | Status: COMPLETED | OUTPATIENT
Start: 2022-02-27 | End: 2022-02-27

## 2022-02-27 RX ORDER — SODIUM CHLORIDE 9 MG/ML
1000 INJECTION INTRAMUSCULAR; INTRAVENOUS; SUBCUTANEOUS ONCE
Refills: 0 | Status: COMPLETED | OUTPATIENT
Start: 2022-02-27 | End: 2022-02-27

## 2022-02-27 RX ORDER — CIPROFLOXACIN LACTATE 400MG/40ML
400 VIAL (ML) INTRAVENOUS ONCE
Refills: 0 | Status: DISCONTINUED | OUTPATIENT
Start: 2022-02-27 | End: 2022-02-27

## 2022-02-27 RX ORDER — OXYCODONE HYDROCHLORIDE 5 MG/1
1 TABLET ORAL
Qty: 12 | Refills: 0
Start: 2022-02-27 | End: 2022-03-01

## 2022-02-27 RX ORDER — VANCOMYCIN HCL 1 G
1000 VIAL (EA) INTRAVENOUS ONCE
Refills: 0 | Status: COMPLETED | OUTPATIENT
Start: 2022-02-27 | End: 2022-02-27

## 2022-02-27 RX ORDER — MORPHINE SULFATE 50 MG/1
4 CAPSULE, EXTENDED RELEASE ORAL ONCE
Refills: 0 | Status: DISCONTINUED | OUTPATIENT
Start: 2022-02-27 | End: 2022-02-27

## 2022-02-27 RX ORDER — HYDROMORPHONE HYDROCHLORIDE 2 MG/ML
1 INJECTION INTRAMUSCULAR; INTRAVENOUS; SUBCUTANEOUS ONCE
Refills: 0 | Status: DISCONTINUED | OUTPATIENT
Start: 2022-02-27 | End: 2022-02-27

## 2022-02-27 RX ORDER — HYDROMORPHONE HYDROCHLORIDE 2 MG/ML
0.5 INJECTION INTRAMUSCULAR; INTRAVENOUS; SUBCUTANEOUS ONCE
Refills: 0 | Status: DISCONTINUED | OUTPATIENT
Start: 2022-02-27 | End: 2022-02-27

## 2022-02-27 RX ORDER — AZTREONAM 2 G
1 VIAL (EA) INJECTION
Qty: 14 | Refills: 0
Start: 2022-02-27 | End: 2022-03-05

## 2022-02-27 RX ADMIN — HYDROMORPHONE HYDROCHLORIDE 1 MILLIGRAM(S): 2 INJECTION INTRAMUSCULAR; INTRAVENOUS; SUBCUTANEOUS at 19:28

## 2022-02-27 RX ADMIN — SODIUM CHLORIDE 1000 MILLILITER(S): 9 INJECTION INTRAMUSCULAR; INTRAVENOUS; SUBCUTANEOUS at 21:11

## 2022-02-27 RX ADMIN — MORPHINE SULFATE 4 MILLIGRAM(S): 50 CAPSULE, EXTENDED RELEASE ORAL at 18:33

## 2022-02-27 RX ADMIN — Medication 975 MILLIGRAM(S): at 19:00

## 2022-02-27 RX ADMIN — SODIUM CHLORIDE 1000 MILLILITER(S): 9 INJECTION INTRAMUSCULAR; INTRAVENOUS; SUBCUTANEOUS at 18:35

## 2022-02-27 RX ADMIN — Medication 975 MILLIGRAM(S): at 18:33

## 2022-02-27 RX ADMIN — HYDROMORPHONE HYDROCHLORIDE 0.5 MILLIGRAM(S): 2 INJECTION INTRAMUSCULAR; INTRAVENOUS; SUBCUTANEOUS at 22:56

## 2022-02-27 RX ADMIN — MORPHINE SULFATE 4 MILLIGRAM(S): 50 CAPSULE, EXTENDED RELEASE ORAL at 19:00

## 2022-02-27 RX ADMIN — Medication 250 MILLIGRAM(S): at 19:28

## 2022-02-27 NOTE — CONSULT NOTE ADULT - SUBJECTIVE AND OBJECTIVE BOX
HPI  26 y.o male with PMHx of congenital UPJO s/p bilateral robotic pyeloplasty at outside hospital, right in 2017 and May 2017, Multiple stones in the past, h/o R URS in 2021 presented to the ED with left flank pain for 3 days, fever for 1 day as high as 103 associated with nausea and vomiting.   In ED, he was febrile to 102.2, /61, HR: 105. Urine and blood cultures were sent. He refused Cipro and was given Vanco.      PAST MEDICAL & SURGICAL HISTORY:  Renal calculi    Ureteropelvic junction (UPJ) obstruction    Recurrent UTI    Acquired hydronephrosis with ureteropelvic junction (UPJ) obstruction  s/p left repair 2016 ,right 2017        MEDICATIONS  (STANDING):    MEDICATIONS  (PRN):      FAMILY HISTORY: NC      Allergies    No Known Allergies    Intolerances        SOCIAL HISTORY: NC    REVIEW OF SYSTEMS: Otherwise negative as stated in HPI    Physical Exam  Vital signs  T(F): 99.4 (22 @ 21:11), Max: 102.2 (22 @ 18:43)  HR: 81 (22 @ 21:11)  BP: 117/57 (22 @ 19:50)  SpO2: 100% (22 @ 21:11)    Output    UOP    Gen:  [x] NAD [] toxic    Pulm:  [x] no resp distress [x] no substernal retractions  	  CV:  [] no JVD  [x] RRR    GI:  [x] Soft [x] ND [x] NT, Left CVA tenderness with pinpoint tenderness at old scar from nephrostomy tube.    :  Glans Circumcised [x]Y  []N, []lesions:  Meatus Discharge []Y  [x] N,  Blood []Y [x] N  Testes  Descended [x]Y  []N,    Tender []Y  [x]N,   Epididymis Tender  []Y [x]N                        	  MSK:  Edema []Y [x]N    LABS:       @ 18:32    WBC 10.95 / Hct 41.7  / SCr 1.09     PT/INR - ( 2022 18:32 )   PT: 13.7 sec;   INR: 1.18 ratio         PTT - ( 2022 18:32 )  PTT:31.6 sec  Urinalysis Basic - ( 2022 19:17 )    Color: Yellow / Appearance: Clear / S.026 / pH: x  Gluc: x / Ketone: Negative  / Bili: Negative / Urobili: <2 mg/dL   Blood: x / Protein: Trace / Nitrite: Negative   Leuk Esterase: Negative / RBC: 1 /HPF / WBC 1 /HPF   Sq Epi: x / Non Sq Epi: 0 /HPF / Bacteria: Negative        Urine Cx: p  Blood Cx: p    RADIOLOGY:  < from: CT Abdomen and Pelvis No Cont (22 @ 20:41) >  ACC: [id number] EXAM:  CT ABDOMEN AND PELVIS                          PROCEDURE DATE:  2022          INTERPRETATION:  CLINICAL INFORMATION: Left flank pain and fever. History   of stones/pyeloplasty bilaterally.    COMPARISON: CT abdomen and pelvis 2021, 2017.    CONTRAST/COMPLICATIONS:  IV Contrast: NONE  Oral Contrast: NONE  Complications: NONE    PROCEDURE:  CT of the Abdomen and Pelvis was performed in the prone position.  Sagittal and coronal reformats were performed.    FINDINGS:  LOWER CHEST: Within normal limits.    LIVER: Within normal limits.  BILE DUCTS: Normal caliber.  GALLBLADDER: Within normal limits.  SPLEEN: Within normal limits.  PANCREAS: Within normal limits.  ADRENALS: Within normal limits.  KIDNEYS/URETERS: Punctate calculus at the left ureterovesicular junction   measuring up to 1 mm. Multiple nonobstructive right intrarenal calculi   measuring up to 3 mm. Stable mild bilateral hydronephrosis. No   hydroureter.    BLADDER: Within normal limits.  REPRODUCTIVE ORGANS: Prostate within normal limits.    BOWEL: No bowel obstruction. Appendix is normal.  PERITONEUM: No ascites.  VESSELS: Within normal limits.  RETROPERITONEUM/LYMPH NODES: No lymphadenopathy.  ABDOMINAL WALL: Within normal limits.  BONES: Within normal limits.    IMPRESSION:  Punctate calculus in the left UVJ measuring up to 1 mm.        --- End of Report ---          ESTHER CHOUDHURY MD; Resident Radiologist  This document has been electronically signed.  DEVAUGHN DEE MD; Attending Radiologist  This document has been electronically signed. 2022  9:58PM    < end of copied text >

## 2022-02-27 NOTE — ED ADULT NURSE NOTE - OBJECTIVE STATEMENT
Patient is a 27 yo male h/x kidney stones, presenting with L flank pain x 3 days, also with dysuria, urinary urgency, nausea, diarrhea, fever. Patient AAOx4, denies abdominal pain, vomiting, no signs of respiratory distress. Patient with oral temp 102.2, MD Dean notified. 20G PIV placed to RAC, labs and blood cultures sent per orders. Patient refusing cipro, MD Dean notified who spoke with patient and patient continued to refuse based on side effects he has heard of. Fall precautions maintained. Pending CT. Patient is a 25 yo male h/x bilateral UPJ obstruction s/p pyeloplasty, nephrolithiasis, stent placement and subsequent removal, presenting with L flank pain x 3 days, also with urinary urgency/burning, nausea, diarrhea, fever. Patient AAOx4, denies abdominal pain, vomiting, no signs of respiratory distress. Patient with oral temp 102.2, MD Dean notified. 20G PIV placed to RAC, labs and blood cultures sent per orders. Patient refusing cipro, MD Dean notified who spoke with patient and patient continued to refuse based on side effects he has heard of. Fall precautions maintained. Pending CT.

## 2022-02-27 NOTE — ED PROVIDER NOTE - OBJECTIVE STATEMENT
25yo Male PMH hx of bilateral UPJ obstruction s/p pyeloplasty - left repaired Dec 2016 and right May 2017 and nephrolithiasis, s/p R URS with stent placement on 8/23 with Dr. Dasilva and subsequent removal presenting to ER today for left flank pain x 4 days. Pt. states for the past 4 days has been experiencing left sided flank pain with nausea and fevers of tmax 103F at home. Pt. states pain became worse today and fevers were not going down so he came to ER. Has bee taking tylenol for pain/fevers with minimal relief (last tylenol last night). Denies vomit weakness hemturia dysuria loc. 27yo Male PMH hx of bilateral UPJ obstruction s/p pyeloplasty - left repaired Dec 2016 and right May 2017 and nephrolithiasis, s/p R URS with stent placement on 8/23 with Dr. Dasilva and subsequent removal presenting to ER today for left flank pain x 4 days. Pt. states for the past 4 days has been experiencing left sided flank pain with nausea and fevers of tmax 103F at home. Pt. states pain became worse today and fevers were not going down so he came to ER. Has bee taking tylenol for pain/fevers with minimal relief (last tylenol last night). Denies vomit weakness hematuria dysuria loc.

## 2022-02-27 NOTE — ED PROVIDER NOTE - MDM ORDERS SUBMITTED SELECTION
TC to pt.  Pt states that was switched to a new OCP this year.  LOV- 2/26/21.  Pt states that while on this OCP she was having painful cramping with and without bleeding.  Pt states that she would bleed for about 3 weeks and have 2 periods a month while on this OCP.  Pt states that things were better on her previous OCP, but still had cramping.  Pt states she decided to stop her OCP in June.  Since then she reports having a normal period in July and a normal period currently, LMP: 8/5/21.   Pt states that she does not want to start another OCP or switch back to her previous OCP.  Pt states that she wants to see how her cycles are with out any birth control.  Pt states that she will wait to come in and see Dr. Xiong until she returns from maternity leave to discuss further birth control management.  Pt requesting to have OCP discontinued.  Pt states she finally feels well being off of birth control and plans to use condoms until she makes another appointment with Dr. Xiong.     Labs/Imaging Studies/Medications

## 2022-02-27 NOTE — ED PROVIDER NOTE - CLINICAL SUMMARY MEDICAL DECISION MAKING FREE TEXT BOX
25 y/o male w/ extensive urologic history c/o left flank pain and fevers x 4 days  -concern for infected stone  -sepsis labs ua ucx  -iv fluids tylenol  -ct abd pelvis  -likely urology consult

## 2022-02-27 NOTE — ED PROVIDER NOTE - PATIENT PORTAL LINK FT
You can access the FollowMyHealth Patient Portal offered by [facility] by registering at the following website: [email or web address]. By joining [employer]’s FollowMyHealth portal, you will also be able to view your health information using other applications (apps) compatible with our system.

## 2022-02-27 NOTE — ED PROVIDER NOTE - NSFOLLOWUPINSTRUCTIONS_ED_ALL_ED_FT
Kidney Stones    WHAT YOU NEED TO KNOW:  What is a kidney stone? Kidney stones form in the urinary system when the water and waste in your urine are out of balance. When this happens, certain types of waste crystals separate from the urine. The crystals build up and form kidney stones. Kidney stones can be made of uric acid, calcium, phosphate, or oxalate crystals. You may have more than one kidney stone.    What increases my risk for kidney stones?   - Not drinking enough liquids (especially water) each day  - Having urinary tract infections often  - Too much of certain foods, such as meat, salt, nuts, and chocolate  - Obesity  - Certain medicines, such as diuretics, steroids, and antacids  - A family history of kidney stones  - Being born with a kidney or bowel disorder    What are the signs and symptoms of kidney stones?   - Pain in the middle of your back that moves across to your side or that may spread to your groin  - Nausea and vomiting  - Urge to urinate often, burning feeling when you urinate, or pink or red urine  - Tenderness in your lower back, side, or stomach    How are kidney stones diagnosed? Your healthcare provider will ask about your health and usual foods. He or she may refer you to a urologist. You may need tests to find out what type of kidney stones you have. Tests can show the size of your kidney stones and where they are in your urinary system. You may need more than one of the following:  - Urine tests may show if you have blood in your urine. They may also show high amounts of the substances that form kidney stones, such as uric acid.  - Blood tests show how well your kidneys are working. They may also be used to check the levels of calcium or uric acid in your blood.  - X-ray or ultrasound pictures may be taken of your kidneys, bladder, and ureters. You may be given contrast liquid before an x-ray to help these show up better in the pictures. You may need to have more than one x-ray. Tell the healthcare provider if you have ever had an allergic reaction to contrast liquid.    How are kidney stones treated?   - NSAIDs, such as ibuprofen, help decrease swelling, pain, and fever. This medicine is available with or without a doctor's order. NSAIDs can cause stomach bleeding or kidney problems in certain people. If you take blood thinner medicine, always ask your healthcare provider if NSAIDs are safe for you. Always read the medicine label and follow directions.  - Acetaminophen decreases pain and fever. It is available without a doctor's order. Ask how much to take and how often to take it. Follow directions. Read the labels of all other medicines you are using to see if they also contain acetaminophen, or ask your doctor or pharmacist. Acetaminophen can cause liver damage if not taken correctly. Do not use more than 4 grams (4,000 milligrams) total of acetaminophen in one day.   - Prescription pain medicine may be given. Ask your healthcare provider how to take this medicine safely. Some prescription pain medicines contain acetaminophen. Do not take other medicines that contain acetaminophen without talking to your healthcare provider. Too much acetaminophen may cause liver damage. Prescription pain medicine may cause constipation. Ask your healthcare provider how to prevent or treat constipation.   - Medicines to balance your electrolytes may be needed.  - A procedure or surgery to remove the kidney stones may be needed if they do not pass on their own. Your treatment will depend on the size and location of your kidney stones.    What can I do to manage kidney stones?   - Drink more liquids. Your healthcare provider may tell you to drink at least 8 to 12 (eight-ounce) cups of liquids each day. This helps flush out the kidney stones when you urinate. Water is the best liquid to drink.  - Strain your urine every time you go to the bathroom. Urinate through a strainer or a piece of thin cloth to catch the stones. Take the stones to your healthcare provider so they can be sent to the lab for tests. This will help your healthcare providers plan the best treatment for you.  Look for Stones in the Filter  - Eat a variety of healthy foods. Healthy foods include fruits, vegetables, whole-grain breads, low-fat dairy products, beans, and fish. You may need to limit how much sodium (salt) or protein you eat. Ask for information about the best foods for you.    Healthy Foods  - Be physically active as directed. Your stones may pass more easily if you stay active. Physical activity can also help you manage your weight. Ask about the best activities for you.    When should I seek immediate care?   - You are vomiting and it is not relieved with medicine.    When should I call my doctor?   - You have a fever.  - You have trouble urinating.  - You see blood in your urine.  - You have severe pain.  - You have any questions or concerns about your condition or care.    CARE AGREEMENT:  You have the right to help plan your care. Learn about your health condition and how it may be treated. Discuss treatment options with your healthcare providers to decide what care you want to receive. You always have the right to refuse treatment.

## 2022-02-27 NOTE — ED PROVIDER NOTE - ATTENDING CONTRIBUTION TO CARE
Attending note:   After face to face evaluation of this patient, I concur with above noted hx, pe, and care plan for this patient.  Dean: 26 yom with bilateral UPJ obstruction s/p revision and hx of kidney stones requiring multiple stenting and infected stones. Pt noted fever at home since last night with left sided back pain radiating to left front over few days. Pt. Pt is well appearing, no distress, tachy, clear lungs, RRR, abd soft with LLQ tn mild and left CVAT. no edema. Pt with concern for infected stone/pyelo vs other infectious causes - labs, CXR, UA, cultures, fluids, antibxs.  Pt refused Cipro and old Ucx reviewed, given Vanco after discussion with  team.

## 2022-02-27 NOTE — CONSULT NOTE ADULT - ASSESSMENT
26 y.o male presented to the ED with Left flank pain, nausea, vomiting and fever. CT showed a punctate stone in the left UVJ, with stable chronic  bilateral hydronephrosis.  CT reviewed, punctate stone is less likely attributing to hydro on the left side. No urological/stent placement needed at this time.

## 2022-02-27 NOTE — ED PROVIDER NOTE - PROGRESS NOTE DETAILS
PA Berry: Pt. reassessed. CT showing 1mm punctate upj stone. Labs WNL. UA negative.  Pt. seen by urology - state no urologic intervention at this time and states no suspicion for infected stone and is cleared from their standpoint.  Discussion had with patient - shared decision making held - patient feeling better and tolerating PO. Stable for DC with po pain meds. ER to follow up urine and blood culture reports. Pt. educated on strict return to ER precautions.   Risks, benefits explained in full.  All questions answered.  Patient is alert oriented to person, place and time and understands plan. PA Berry: Pt. reassessed. CT showing 1mm punctate upj stone. Labs WNL. UA negative.  Pt. seen by urology - state no urologic intervention at this time and states no suspicion for infected stone and is cleared from their standpoint.  Discussion had with patient - shared decision making held - patient feeling better and tolerating PO. Stable for DC with po pain meds. ER to follow up urine and blood culture reports. Pt. educated on strict return to ER precautions.   Risks, benefits explained in full.  All questions answered.  Patient is alert oriented to person, place and time and understands plan.  Pt. requesting dose of IV pain meds before dc due to pharmacy being closed.   Unclear source of fever - UA negative but will send on abx until urine culture results . WIll rx bactrim (based off previous urine sensitivities) PA Berry: Pt. reassessed. CT showing 1mm punctate upj stone. Labs WNL. UA negative.  Pt. seen by urology - state no urologic intervention at this time and states no suspicion for infected stone and is cleared from their standpoint. Discussion had with patient - shared decision making held - patient feeling better and tolerating PO. Stable for DC with po pain meds. ER to follow up urine and blood culture reports. Pt. educated on strict return to ER precautions. Risks, benefits explained in full.  All questions answered.  Patient is alert oriented to person, place and time and understands plan.  Pt. requesting dose of IV pain meds before dc due to pharmacy being closed.   Unclear source of fever - UA negative but will send on abx until urine culture results . WIll rx bactrim (based off previous urine sensitivities)

## 2022-02-27 NOTE — CONSULT NOTE ADULT - PROBLEM SELECTOR RECOMMENDATION 9
-F/U urine and blood cultures  -Antibiotics  -Pain management  -No urological intervention needed  -Hydration  -Case discussed with Dr Bueno/ Dr Eckert -F/U urine and blood cultures  -Antibiotics  -Pain management  -No urological intervention needed  -Conservative management  -Hydration  -Medical admission  -Will follow as consultant  -Case discussed with Dr Bueno/ Dr Eckert

## 2022-03-01 LAB
CULTURE RESULTS: SIGNIFICANT CHANGE UP
SPECIMEN SOURCE: SIGNIFICANT CHANGE UP

## 2022-03-05 LAB
CULTURE RESULTS: SIGNIFICANT CHANGE UP
CULTURE RESULTS: SIGNIFICANT CHANGE UP
SPECIMEN SOURCE: SIGNIFICANT CHANGE UP
SPECIMEN SOURCE: SIGNIFICANT CHANGE UP

## 2022-03-30 ENCOUNTER — NON-APPOINTMENT (OUTPATIENT)
Age: 27
End: 2022-03-30

## 2022-04-06 ENCOUNTER — APPOINTMENT (OUTPATIENT)
Dept: UROLOGY | Facility: CLINIC | Age: 27
End: 2022-04-06
Payer: MEDICAID

## 2022-04-06 DIAGNOSIS — N13.30 UNSPECIFIED HYDRONEPHROSIS: ICD-10-CM

## 2022-04-06 PROCEDURE — 99213 OFFICE O/P EST LOW 20 MIN: CPT

## 2022-04-06 NOTE — HISTORY OF PRESENT ILLNESS
[FreeTextEntry1] :  He is a 22-year-old man with history of bilateral ureteropelvic junction obstruction status post metachronous bilateral pyeloplasty procedures performed by robotic-assisted laparoscopic technique at an outside urologist. He has has a residual flank discomfort and pain periodically. He says that he has a fullness sensation like pressure in the back which is more significant on his left than on his right. He recently had developed increasing left-sided flank pain and when he went to the emergency department he was found to have hydronephrosis. The hydronephrosis was not a new finding but because of the pain, the nephrostomy tube was placed. Ultimately cultures from that tube grew Pseudomonas bacteria and he has now been adequately treated with antibiotics. I had performed an antegrade nephrostogram last week which demonstrated fullness of the left-sided collecting system but adequate drainage from the kidney without any sign of obstruction or narrowing at the UPJ. He has now completed a course of antibiotics and he is here to discuss further management and have the nephrostomy tube removed.\par \par here last week c/o urinary frequency every 1-2 hours during the day without urgency and 3-4 times a night. he decreased POs but didn't see a difference. The FOS is a bit diminished and can have terminal dribbling but no straining or intermittency. he had a febrile UTI 3/21. He also has some flank pressure from time to time, L>R. \par has not had any f/u imaging in several years. \par \par ULS notes some hydro and a stone. \par Culture grew staph - treated UTI and frequency better,though feels coming back a bit. \par CT notes 4 stones RIGH T kidney.\par nephrograms and excretion equal. \par had ureteroscopy and stones not in collecting system.\par \par 4/22 - had some left flank pain; felt like passing a stone. felt feverish so went to the  ER; UA and subsequent culture were negative.\par CT scan which i reviewed with him noted a 1mm UVJ stone though i don't see, it. 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

## 2022-08-22 NOTE — H&P ADULT - NSICDXPASTMEDICALHX_GEN_ALL_CORE_FT
PAST MEDICAL HISTORY:  Recurrent UTI     Renal calculi     Ureteropelvic junction (UPJ) obstruction     
134.9

## 2023-03-22 NOTE — ASU PREOP CHECKLIST - WARM FLUIDS/WARM BLANKETS
14 year old female admitted with vomiting, diarrhea and abdominal pain. She continues to have diffuse abdominal pain and random episodes of vomiting.  She had one stool yesterday that was soft to formed.  There was no blood noted in her stools.  She had a positive urine culture and is on antibiotics to treat the UTI.  The family history of significant for IBD and H Pylori.  Possible etiologies for her symptoms include H Pylori, infections, IBD, UTI and functional cause  Recommend  -Stool H Pylori, O&P, giardia, GI PCR, calprotectin, culture and c-diff.   - Blood for ANCA, ASCA, ESR, CRP, celiac panel  - complete treatment for UTI  - continue Zofran and famotidine  - will follow     no

## 2023-07-03 NOTE — ASU PATIENT PROFILE, ADULT - PATIENT REPRESENTATIVE: ( YOU CAN CHOOSE ANY PERSON THAT CAN ASSIST YOU WITH YOUR HEALTH CARE PREFERENCES, DOES NOT HAVE TO BE A SPOUSE, IMMEDIATE FAMILY OR SIGNIFICANT OTHER/PARTNER)
DISPLAY PLAN FREE TEXT DISPLAY PLAN FREE TEXT DISPLAY PLAN FREE TEXT DISPLAY PLAN FREE TEXT DISPLAY PLAN FREE TEXT DISPLAY PLAN FREE TEXT Declines

## 2023-07-25 NOTE — H&P ADULT. - BACK
[FreeTextEntry1] : 84yo 
detailed exam

## 2023-08-01 NOTE — ED ADULT NURSE NOTE - MUSCULOSKELETAL WDL
[Good] : ~his/her~  mood as  good [Never (0 pts)] : Never (0 points) [No] : In the past 12 months have you used drugs other than those required for medical reasons? No [No falls in past year] : Patient reported no falls in the past year [0] : 1) Little interest or pleasure doing things: Not at all (0) [1] : 2) Feeling down, depressed, or hopeless for several days (1) [PHQ-2 Negative - No further assessment needed] : PHQ-2 Negative - No further assessment needed [Audit-CScore] : 0 [PTV5Nuvst] : 1 [Patient reported mammogram was normal] : Patient reported mammogram was normal [Patient reported PAP Smear was normal] : Patient reported PAP Smear was normal [Patient reported colonoscopy was normal] : Patient reported colonoscopy was normal [HIV test declined] : HIV test declined [Change in mental status noted] : No change in mental status noted [Language] : denies difficulty with language [Behavior] : denies difficulty with behavior [Learning/Retaining New Information] : denies difficulty learning/retaining new information [Handling Complex Tasks] : denies difficulty handling complex tasks [Reasoning] : denies difficulty with reasoning [Spatial Ability and Orientation] : denies difficulty with spatial ability and orientation [None] : None [With Family] : lives with family [] :  [Sexually Active] : sexually active [High Risk Behavior] : no high risk behavior [Feels Safe at Home] : Feels safe at home [Fully functional (bathing, dressing, toileting, transferring, walking, feeding)] : Fully functional (bathing, dressing, toileting, transferring, walking, feeding) [Fully functional (using the telephone, shopping, preparing meals, housekeeping, doing laundry, using] : Fully functional and needs no help or supervision to perform IADLs (using the telephone, shopping, preparing meals, housekeeping, doing laundry, using transportation, managing medications and managing finances) [Reports changes in hearing] : Reports no changes in hearing [Reports changes in vision] : Reports no changes in vision [Reports changes in dental health] : Reports no changes in dental health [Smoke Detector] : smoke detector [Safety elements used in home] : safety elements used in home [Seat Belt] :  uses seat belt [MammogramDate] : 06/22 [PapSmearDate] : 03/23 [ColonoscopyDate] : 04/23 [Never] : Never Full range of motion of upper and lower extremities, no joint tenderness/swelling.

## 2024-01-01 NOTE — H&P ADULT. - DOES THIS PATIENT HAVE A HISTORY OF OR HAS BEEN DX WITH HEART FAILURE?
Assessment: AO setup, DEBBIE negative. TSB this AM was 10.5, continues to decrease. remains below LL of 13.3     Plan:  -Follow on GLs   no

## 2024-01-01 NOTE — ED ADULT NURSE NOTE - NS ED NURSE LEVEL OF CONSCIOUSNESS ORIENTATION
Baby lotion may be used on the skin if it is excessively dry, but avoid the face and scalp.  Do not put Q-tips into the ear canal.  Wax will melt and collect at the opening to the ear canal.  This can be easily cleaned with safety Q-tips or a wash cloth.  Sleep  Babies typically sleep for 16 hours a day.  This lessens as they grow older, especially around 3-4 months-of-age.  BABIES MUST SLEEP ON THEIR BACKS to reduce the risk of SIDS (sudden infant death syndrome).    Other ways to reduce the risk of SIDS:  Use a pacifier during sleep time.  Avoid allowing the baby to get overheated.  Recommended room temperature is 68-72 degrees. Keep a season-appropriate sleeper or gown on the baby  No blankets in the crib   Babies may not sleep through the night for several more weeks or months.  It is not a good idea to start cereal before 4 months-of-age without a good medical reason because of the risks associated (see above).  This is despite what grandma may say.    Bowel & Bladder Habits  Babies typically urinate six times a day  Bowel movements  often accompanied by grunting, turning red or apparent straining.    This is not due to constipation, but the baby’s frustration at learning how to eliminate a bowel movement when the urge arises.  Constipation = firm or hard stools, not several days between bowel movements  It is not uncommon for some babies to have bowel movements four times a day or every 4 or 5 days.  As long as stools are soft, there is nothing to worry about.    Safety  Never take your child in any car unless he is properly restrained in an infant car seat. The infant should continue to face rearward. Always restrain your baby in an appropriate infant car seat. (Besides being common sense, IT'S THE LAW!). Remember this applies to when riding in someone else's car.  Infants may roll over or scoot long before they will truly master these skills. Never leave your infant on a surface (including a bed) from which 
Oriented - self; Oriented - place; Oriented - time

## 2024-04-24 NOTE — ED PROVIDER NOTE - CONDUCTED A DETAILED DISCUSSION WITH PATIENT AND/OR GUARDIAN REGARDING, MDM
MT - Please advise on EKG in chart, any recommendations?  Patient reported having palpations. Do you want to increase metoprolol?   radiology results/lab results

## 2024-05-30 ENCOUNTER — EMERGENCY (EMERGENCY)
Facility: HOSPITAL | Age: 29
LOS: 1 days | Discharge: HOME | End: 2024-05-30
Attending: EMERGENCY MEDICINE | Admitting: EMERGENCY MEDICINE
Payer: COMMERCIAL

## 2024-05-30 VITALS
TEMPERATURE: 98 F | HEIGHT: 67 IN | DIASTOLIC BLOOD PRESSURE: 68 MMHG | SYSTOLIC BLOOD PRESSURE: 118 MMHG | OXYGEN SATURATION: 97 % | HEART RATE: 76 BPM | WEIGHT: 169.98 LBS | RESPIRATION RATE: 18 BRPM

## 2024-05-30 DIAGNOSIS — N13.0 HYDRONEPHROSIS WITH URETEROPELVIC JUNCTION OBSTRUCTION: Chronic | ICD-10-CM

## 2024-05-30 LAB
ALBUMIN SERPL ELPH-MCNC: 3.9 G/DL — SIGNIFICANT CHANGE UP (ref 3.3–5)
ALP SERPL-CCNC: 58 U/L — SIGNIFICANT CHANGE UP (ref 40–120)
ALT FLD-CCNC: 41 U/L — SIGNIFICANT CHANGE UP (ref 12–78)
ANION GAP SERPL CALC-SCNC: 6 MMOL/L — SIGNIFICANT CHANGE UP (ref 5–17)
AST SERPL-CCNC: 21 U/L — SIGNIFICANT CHANGE UP (ref 15–37)
BASOPHILS # BLD AUTO: 0.04 K/UL — SIGNIFICANT CHANGE UP (ref 0–0.2)
BASOPHILS NFR BLD AUTO: 0.3 % — SIGNIFICANT CHANGE UP (ref 0–2)
BILIRUB SERPL-MCNC: 0.9 MG/DL — SIGNIFICANT CHANGE UP (ref 0.2–1.2)
BUN SERPL-MCNC: 15 MG/DL — SIGNIFICANT CHANGE UP (ref 7–23)
CALCIUM SERPL-MCNC: 8.7 MG/DL — SIGNIFICANT CHANGE UP (ref 8.5–10.1)
CHLORIDE SERPL-SCNC: 107 MMOL/L — SIGNIFICANT CHANGE UP (ref 96–108)
CO2 SERPL-SCNC: 25 MMOL/L — SIGNIFICANT CHANGE UP (ref 22–31)
CREAT SERPL-MCNC: 1.1 MG/DL — SIGNIFICANT CHANGE UP (ref 0.5–1.3)
EGFR: 94 ML/MIN/1.73M2 — SIGNIFICANT CHANGE UP
EOSINOPHIL # BLD AUTO: 0.29 K/UL — SIGNIFICANT CHANGE UP (ref 0–0.5)
EOSINOPHIL NFR BLD AUTO: 2.4 % — SIGNIFICANT CHANGE UP (ref 0–6)
GLUCOSE SERPL-MCNC: 112 MG/DL — HIGH (ref 70–99)
HCT VFR BLD CALC: 38.5 % — LOW (ref 39–50)
HGB BLD-MCNC: 12.5 G/DL — LOW (ref 13–17)
IMM GRANULOCYTES NFR BLD AUTO: 0.3 % — SIGNIFICANT CHANGE UP (ref 0–0.9)
LYMPHOCYTES # BLD AUTO: 18.4 % — SIGNIFICANT CHANGE UP (ref 13–44)
LYMPHOCYTES # BLD AUTO: 2.19 K/UL — SIGNIFICANT CHANGE UP (ref 1–3.3)
MCHC RBC-ENTMCNC: 20 PG — LOW (ref 27–34)
MCHC RBC-ENTMCNC: 32.5 GM/DL — SIGNIFICANT CHANGE UP (ref 32–36)
MCV RBC AUTO: 61.6 FL — LOW (ref 80–100)
MONOCYTES # BLD AUTO: 0.89 K/UL — SIGNIFICANT CHANGE UP (ref 0–0.9)
MONOCYTES NFR BLD AUTO: 7.5 % — SIGNIFICANT CHANGE UP (ref 2–14)
NEUTROPHILS # BLD AUTO: 8.42 K/UL — HIGH (ref 1.8–7.4)
NEUTROPHILS NFR BLD AUTO: 71.1 % — SIGNIFICANT CHANGE UP (ref 43–77)
NRBC # BLD: 0 /100 WBCS — SIGNIFICANT CHANGE UP (ref 0–0)
PLATELET # BLD AUTO: 293 K/UL — SIGNIFICANT CHANGE UP (ref 150–400)
POTASSIUM SERPL-MCNC: 4 MMOL/L — SIGNIFICANT CHANGE UP (ref 3.5–5.3)
POTASSIUM SERPL-SCNC: 4 MMOL/L — SIGNIFICANT CHANGE UP (ref 3.5–5.3)
PROT SERPL-MCNC: 7 G/DL — SIGNIFICANT CHANGE UP (ref 6–8.3)
RBC # BLD: 6.25 M/UL — HIGH (ref 4.2–5.8)
RBC # FLD: 17.3 % — HIGH (ref 10.3–14.5)
SODIUM SERPL-SCNC: 138 MMOL/L — SIGNIFICANT CHANGE UP (ref 135–145)
TROPONIN I, HIGH SENSITIVITY RESULT: 4.1 NG/L — SIGNIFICANT CHANGE UP
WBC # BLD: 11.87 K/UL — HIGH (ref 3.8–10.5)
WBC # FLD AUTO: 11.87 K/UL — HIGH (ref 3.8–10.5)

## 2024-05-30 PROCEDURE — 36415 COLL VENOUS BLD VENIPUNCTURE: CPT

## 2024-05-30 PROCEDURE — 99285 EMERGENCY DEPT VISIT HI MDM: CPT | Mod: 25

## 2024-05-30 PROCEDURE — 93005 ELECTROCARDIOGRAM TRACING: CPT

## 2024-05-30 PROCEDURE — 99285 EMERGENCY DEPT VISIT HI MDM: CPT

## 2024-05-30 PROCEDURE — 84484 ASSAY OF TROPONIN QUANT: CPT

## 2024-05-30 PROCEDURE — 85025 COMPLETE CBC W/AUTO DIFF WBC: CPT

## 2024-05-30 PROCEDURE — 71045 X-RAY EXAM CHEST 1 VIEW: CPT | Mod: 26

## 2024-05-30 PROCEDURE — 80053 COMPREHEN METABOLIC PANEL: CPT

## 2024-05-30 PROCEDURE — 71045 X-RAY EXAM CHEST 1 VIEW: CPT

## 2024-05-30 PROCEDURE — 93010 ELECTROCARDIOGRAM REPORT: CPT

## 2024-05-30 RX ORDER — SODIUM CHLORIDE 9 MG/ML
1000 INJECTION INTRAMUSCULAR; INTRAVENOUS; SUBCUTANEOUS ONCE
Refills: 0 | Status: COMPLETED | OUTPATIENT
Start: 2024-05-30 | End: 2024-05-30

## 2024-05-30 RX ADMIN — SODIUM CHLORIDE 1000 MILLILITER(S): 9 INJECTION INTRAMUSCULAR; INTRAVENOUS; SUBCUTANEOUS at 23:28

## 2024-05-30 NOTE — ED ADULT TRIAGE NOTE - CHIEF COMPLAINT QUOTE
Pt had root canal today, did not eat or drink anything, was playing outside with dog, when he stood up had a brief syncopal episode.

## 2024-05-30 NOTE — ED ADULT NURSE NOTE - NSFALLRISKINTERV_ED_ALL_ED

## 2024-05-30 NOTE — ED PROVIDER NOTE - PATIENT PORTAL LINK FT
You can access the FollowMyHealth Patient Portal offered by [facility] by registering at the following website: [email or web address]. By joining [employer]’s FollowMyHealth portal, you will also be able to view your health information using other applications (apps) compatible with our system.

## 2024-05-30 NOTE — ED PROVIDER NOTE - CLINICAL SUMMARY MEDICAL DECISION MAKING FREE TEXT BOX
28-year-old male with syncope, likely appears to be orthostatic syncope due to being n.p.o. all day.  Will evaluate need for cardiogenic versus neurogenic versus metabolic versus dehydration.  Will get labs, EKG, chest x-ray.  No head injury, no focal neurodeficits.  At this time patient does not need a head CT.  Patient given some food and drink in the ED.  Will reassess. 28-year-old male with syncope, likely appears to be orthostatic syncope due to being n.p.o. all day.  Will evaluate need for cardiogenic versus neurogenic versus metabolic versus dehydration.  Will get labs, EKG, chest x-ray.  No head injury, no focal neuro deficits.  At this time patient does not need a head CT.  Patient given some food and drink in the ED.  Will reassess.

## 2024-05-30 NOTE — ED PROVIDER NOTE - CARE PROVIDER_API CALL
Phong Calles  Cardiovascular Disease  [address] [place] [address]  Phone: [phone]  Fax: [phone]  Follow Up Time: Routine

## 2024-05-30 NOTE — ED PROVIDER NOTE - OBJECTIVE STATEMENT
29 yo M with no medical hx presents for evaluation after a syncopal episode.  Patient states he had a root canal done this evening around 6 PM.  He was advised not to eat or drink a few hours prior to his procedure.  Patient reports he was busy at work and so skipped lunch and did not eat or drink anything after that.  States while he was at the dentist he required multiple lidocaine injections to adequately numb his tooth.  States after he left the dentist he arrived home he was playing with the dog was running in the backyard with the dog.  He then went to sit down and when he got up he passed out.  The girlfriend was there who caught him.  He did not hit the ground.  States he just feels a little fatigued but denies any other complaints.  No prior episodes.

## 2024-05-30 NOTE — ED ADULT NURSE NOTE - OBJECTIVE STATEMENT
Pt received in room T4, pt is A+Ox4 and independent with ambulation and ADLs at baseline. Pt is presenting with a chief complaint of syncopal episode today. Per pt he had a dental procedure earlier today for which he had to be NPO, pt states he went for a run with his dog after which started to feel "warm and fuzzy", pt states he grabbed onto his girlfriend for support after which he woke up on the ground. Unknown duration for LOC. Pt states he is overall feeling weak and fatigued. Pt denies cp/sob/palpitations. Pt denies nausea/vomiting/diarrhea/constipation. IVL placed by EMS prior to arrival, s/p 1L NS by EMS. Labs drawn, CXR and EKG completed and additional IV fluids hung. Pt ok to eat per MD, drink and sandwich provided. Pt states he is feeling better. Pt pending results

## 2024-05-31 VITALS
HEART RATE: 68 BPM | OXYGEN SATURATION: 96 % | DIASTOLIC BLOOD PRESSURE: 70 MMHG | SYSTOLIC BLOOD PRESSURE: 108 MMHG | RESPIRATION RATE: 18 BRPM | TEMPERATURE: 97 F

## 2024-06-19 ENCOUNTER — EMERGENCY (EMERGENCY)
Facility: HOSPITAL | Age: 29
LOS: 1 days | Discharge: HOME | End: 2024-06-19
Attending: EMERGENCY MEDICINE | Admitting: EMERGENCY MEDICINE
Payer: COMMERCIAL

## 2024-06-19 VITALS
DIASTOLIC BLOOD PRESSURE: 80 MMHG | WEIGHT: 175.05 LBS | SYSTOLIC BLOOD PRESSURE: 120 MMHG | HEART RATE: 102 BPM | OXYGEN SATURATION: 98 % | HEIGHT: 67 IN | TEMPERATURE: 102 F | RESPIRATION RATE: 18 BRPM

## 2024-06-19 VITALS
RESPIRATION RATE: 16 BRPM | TEMPERATURE: 100 F | DIASTOLIC BLOOD PRESSURE: 81 MMHG | SYSTOLIC BLOOD PRESSURE: 114 MMHG | OXYGEN SATURATION: 99 % | HEART RATE: 75 BPM

## 2024-06-19 DIAGNOSIS — N13.0 HYDRONEPHROSIS WITH URETEROPELVIC JUNCTION OBSTRUCTION: Chronic | ICD-10-CM

## 2024-06-19 LAB
ALBUMIN SERPL ELPH-MCNC: 4 G/DL — SIGNIFICANT CHANGE UP (ref 3.3–5)
ALP SERPL-CCNC: 66 U/L — SIGNIFICANT CHANGE UP (ref 40–120)
ALT FLD-CCNC: 53 U/L — SIGNIFICANT CHANGE UP (ref 12–78)
ANION GAP SERPL CALC-SCNC: 7 MMOL/L — SIGNIFICANT CHANGE UP (ref 5–17)
APPEARANCE UR: CLEAR — SIGNIFICANT CHANGE UP
APTT BLD: 30 SEC — SIGNIFICANT CHANGE UP (ref 24.5–35.6)
AST SERPL-CCNC: 42 U/L — HIGH (ref 15–37)
BASOPHILS # BLD AUTO: 0.01 K/UL — SIGNIFICANT CHANGE UP (ref 0–0.2)
BASOPHILS NFR BLD AUTO: 0.1 % — SIGNIFICANT CHANGE UP (ref 0–2)
BILIRUB SERPL-MCNC: 0.4 MG/DL — SIGNIFICANT CHANGE UP (ref 0.2–1.2)
BILIRUB UR-MCNC: NEGATIVE — SIGNIFICANT CHANGE UP
BUN SERPL-MCNC: 11 MG/DL — SIGNIFICANT CHANGE UP (ref 7–23)
CALCIUM SERPL-MCNC: 9.5 MG/DL — SIGNIFICANT CHANGE UP (ref 8.5–10.1)
CHLORIDE SERPL-SCNC: 105 MMOL/L — SIGNIFICANT CHANGE UP (ref 96–108)
CO2 SERPL-SCNC: 24 MMOL/L — SIGNIFICANT CHANGE UP (ref 22–31)
COLOR SPEC: YELLOW — SIGNIFICANT CHANGE UP
CREAT SERPL-MCNC: 1.2 MG/DL — SIGNIFICANT CHANGE UP (ref 0.5–1.3)
DIFF PNL FLD: NEGATIVE — SIGNIFICANT CHANGE UP
EGFR: 85 ML/MIN/1.73M2 — SIGNIFICANT CHANGE UP
EOSINOPHIL # BLD AUTO: 0.01 K/UL — SIGNIFICANT CHANGE UP (ref 0–0.5)
EOSINOPHIL NFR BLD AUTO: 0.1 % — SIGNIFICANT CHANGE UP (ref 0–6)
GLUCOSE SERPL-MCNC: 126 MG/DL — HIGH (ref 70–99)
GLUCOSE UR QL: NEGATIVE MG/DL — SIGNIFICANT CHANGE UP
HCT VFR BLD CALC: 41.9 % — SIGNIFICANT CHANGE UP (ref 39–50)
HGB BLD-MCNC: 13.1 G/DL — SIGNIFICANT CHANGE UP (ref 13–17)
IMM GRANULOCYTES NFR BLD AUTO: 1 % — HIGH (ref 0–0.9)
INR BLD: 1 RATIO — SIGNIFICANT CHANGE UP (ref 0.85–1.18)
KETONES UR-MCNC: NEGATIVE MG/DL — SIGNIFICANT CHANGE UP
LACTATE SERPL-SCNC: 0.9 MMOL/L — SIGNIFICANT CHANGE UP (ref 0.7–2)
LACTATE SERPL-SCNC: 2.1 MMOL/L — HIGH (ref 0.7–2)
LACTATE SERPL-SCNC: 2.2 MMOL/L — HIGH (ref 0.7–2)
LACTATE SERPL-SCNC: 2.4 MMOL/L — HIGH (ref 0.7–2)
LEUKOCYTE ESTERASE UR-ACNC: NEGATIVE — SIGNIFICANT CHANGE UP
LIDOCAIN IGE QN: 25 U/L — SIGNIFICANT CHANGE UP (ref 13–75)
LYMPHOCYTES # BLD AUTO: 0.55 K/UL — LOW (ref 1–3.3)
LYMPHOCYTES # BLD AUTO: 7.8 % — LOW (ref 13–44)
MCHC RBC-ENTMCNC: 19.7 PG — LOW (ref 27–34)
MCHC RBC-ENTMCNC: 31.3 GM/DL — LOW (ref 32–36)
MCV RBC AUTO: 62.9 FL — LOW (ref 80–100)
MONOCYTES # BLD AUTO: 0.64 K/UL — SIGNIFICANT CHANGE UP (ref 0–0.9)
MONOCYTES NFR BLD AUTO: 9.1 % — SIGNIFICANT CHANGE UP (ref 2–14)
NEUTROPHILS # BLD AUTO: 5.74 K/UL — SIGNIFICANT CHANGE UP (ref 1.8–7.4)
NEUTROPHILS NFR BLD AUTO: 81.9 % — HIGH (ref 43–77)
NITRITE UR-MCNC: NEGATIVE — SIGNIFICANT CHANGE UP
NRBC # BLD: 0 /100 WBCS — SIGNIFICANT CHANGE UP (ref 0–0)
PH UR: 7 — SIGNIFICANT CHANGE UP (ref 5–8)
PLATELET # BLD AUTO: 218 K/UL — SIGNIFICANT CHANGE UP (ref 150–400)
POTASSIUM SERPL-MCNC: 4.4 MMOL/L — SIGNIFICANT CHANGE UP (ref 3.5–5.3)
POTASSIUM SERPL-SCNC: 4.4 MMOL/L — SIGNIFICANT CHANGE UP (ref 3.5–5.3)
PROT SERPL-MCNC: 7.7 G/DL — SIGNIFICANT CHANGE UP (ref 6–8.3)
PROT UR-MCNC: NEGATIVE MG/DL — SIGNIFICANT CHANGE UP
PROTHROM AB SERPL-ACNC: 11.7 SEC — SIGNIFICANT CHANGE UP (ref 9.5–13)
RAPID RVP RESULT: SIGNIFICANT CHANGE UP
RBC # BLD: 6.66 M/UL — HIGH (ref 4.2–5.8)
RBC # FLD: 17.9 % — HIGH (ref 10.3–14.5)
SARS-COV-2 RNA SPEC QL NAA+PROBE: SIGNIFICANT CHANGE UP
SODIUM SERPL-SCNC: 136 MMOL/L — SIGNIFICANT CHANGE UP (ref 135–145)
SP GR SPEC: 1.02 — SIGNIFICANT CHANGE UP (ref 1–1.03)
TROPONIN I, HIGH SENSITIVITY RESULT: 6.6 NG/L — SIGNIFICANT CHANGE UP
UROBILINOGEN FLD QL: 1 MG/DL — SIGNIFICANT CHANGE UP (ref 0.2–1)
WBC # BLD: 7.02 K/UL — SIGNIFICANT CHANGE UP (ref 3.8–10.5)
WBC # FLD AUTO: 7.02 K/UL — SIGNIFICANT CHANGE UP (ref 3.8–10.5)

## 2024-06-19 PROCEDURE — 85610 PROTHROMBIN TIME: CPT

## 2024-06-19 PROCEDURE — 83605 ASSAY OF LACTIC ACID: CPT

## 2024-06-19 PROCEDURE — 87040 BLOOD CULTURE FOR BACTERIA: CPT

## 2024-06-19 PROCEDURE — 84484 ASSAY OF TROPONIN QUANT: CPT

## 2024-06-19 PROCEDURE — 81003 URINALYSIS AUTO W/O SCOPE: CPT

## 2024-06-19 PROCEDURE — 99285 EMERGENCY DEPT VISIT HI MDM: CPT

## 2024-06-19 PROCEDURE — 83690 ASSAY OF LIPASE: CPT

## 2024-06-19 PROCEDURE — 85025 COMPLETE CBC W/AUTO DIFF WBC: CPT

## 2024-06-19 PROCEDURE — 93005 ELECTROCARDIOGRAM TRACING: CPT

## 2024-06-19 PROCEDURE — 36415 COLL VENOUS BLD VENIPUNCTURE: CPT

## 2024-06-19 PROCEDURE — 96375 TX/PRO/DX INJ NEW DRUG ADDON: CPT

## 2024-06-19 PROCEDURE — 86665 EPSTEIN-BARR CAPSID VCA: CPT

## 2024-06-19 PROCEDURE — 80053 COMPREHEN METABOLIC PANEL: CPT

## 2024-06-19 PROCEDURE — 99285 EMERGENCY DEPT VISIT HI MDM: CPT | Mod: 25

## 2024-06-19 PROCEDURE — 96376 TX/PRO/DX INJ SAME DRUG ADON: CPT

## 2024-06-19 PROCEDURE — 86663 EPSTEIN-BARR ANTIBODY: CPT

## 2024-06-19 PROCEDURE — 74176 CT ABD & PELVIS W/O CONTRAST: CPT | Mod: MC

## 2024-06-19 PROCEDURE — 93010 ELECTROCARDIOGRAM REPORT: CPT

## 2024-06-19 PROCEDURE — 85730 THROMBOPLASTIN TIME PARTIAL: CPT

## 2024-06-19 PROCEDURE — 96374 THER/PROPH/DIAG INJ IV PUSH: CPT

## 2024-06-19 PROCEDURE — 86664 EPSTEIN-BARR NUCLEAR ANTIGEN: CPT

## 2024-06-19 PROCEDURE — 0225U NFCT DS DNA&RNA 21 SARSCOV2: CPT

## 2024-06-19 PROCEDURE — 74176 CT ABD & PELVIS W/O CONTRAST: CPT | Mod: 26,MC

## 2024-06-19 RX ORDER — MORPHINE SULFATE 50 MG/1
4 CAPSULE, EXTENDED RELEASE ORAL ONCE
Refills: 0 | Status: DISCONTINUED | OUTPATIENT
Start: 2024-06-19 | End: 2024-06-19

## 2024-06-19 RX ORDER — KETOROLAC TROMETHAMINE 30 MG/ML
15 SYRINGE (ML) INJECTION ONCE
Refills: 0 | Status: DISCONTINUED | OUTPATIENT
Start: 2024-06-19 | End: 2024-06-19

## 2024-06-19 RX ORDER — ACETAMINOPHEN 500 MG
1000 TABLET ORAL ONCE
Refills: 0 | Status: COMPLETED | OUTPATIENT
Start: 2024-06-19 | End: 2024-06-19

## 2024-06-19 RX ORDER — CEFTRIAXONE 500 MG/1
1000 INJECTION, POWDER, FOR SOLUTION INTRAMUSCULAR; INTRAVENOUS ONCE
Refills: 0 | Status: COMPLETED | OUTPATIENT
Start: 2024-06-19 | End: 2024-06-19

## 2024-06-19 RX ORDER — SODIUM CHLORIDE 9 MG/ML
1000 INJECTION INTRAMUSCULAR; INTRAVENOUS; SUBCUTANEOUS ONCE
Refills: 0 | Status: COMPLETED | OUTPATIENT
Start: 2024-06-19 | End: 2024-06-19

## 2024-06-19 RX ORDER — SODIUM CHLORIDE 9 MG/ML
2000 INJECTION INTRAMUSCULAR; INTRAVENOUS; SUBCUTANEOUS ONCE
Refills: 0 | Status: COMPLETED | OUTPATIENT
Start: 2024-06-19 | End: 2024-06-19

## 2024-06-19 RX ORDER — IBUPROFEN 200 MG
600 TABLET ORAL ONCE
Refills: 0 | Status: COMPLETED | OUTPATIENT
Start: 2024-06-19 | End: 2024-06-19

## 2024-06-19 RX ORDER — OXYCODONE HYDROCHLORIDE 5 MG/1
5 TABLET ORAL ONCE
Refills: 0 | Status: DISCONTINUED | OUTPATIENT
Start: 2024-06-19 | End: 2024-06-19

## 2024-06-19 RX ORDER — ONDANSETRON 8 MG/1
4 TABLET, FILM COATED ORAL ONCE
Refills: 0 | Status: COMPLETED | OUTPATIENT
Start: 2024-06-19 | End: 2024-06-19

## 2024-06-19 RX ADMIN — Medication 600 MILLIGRAM(S): at 19:47

## 2024-06-19 RX ADMIN — Medication 400 MILLIGRAM(S): at 16:21

## 2024-06-19 RX ADMIN — CEFTRIAXONE 100 MILLIGRAM(S): 500 INJECTION, POWDER, FOR SOLUTION INTRAMUSCULAR; INTRAVENOUS at 09:50

## 2024-06-19 RX ADMIN — Medication 15 MILLIGRAM(S): at 11:31

## 2024-06-19 RX ADMIN — MORPHINE SULFATE 4 MILLIGRAM(S): 50 CAPSULE, EXTENDED RELEASE ORAL at 09:50

## 2024-06-19 RX ADMIN — OXYCODONE HYDROCHLORIDE 5 MILLIGRAM(S): 5 TABLET ORAL at 21:19

## 2024-06-19 RX ADMIN — SODIUM CHLORIDE 2000 MILLILITER(S): 9 INJECTION INTRAMUSCULAR; INTRAVENOUS; SUBCUTANEOUS at 09:50

## 2024-06-19 RX ADMIN — SODIUM CHLORIDE 1000 MILLILITER(S): 9 INJECTION INTRAMUSCULAR; INTRAVENOUS; SUBCUTANEOUS at 19:47

## 2024-06-19 RX ADMIN — SODIUM CHLORIDE 1000 MILLILITER(S): 9 INJECTION INTRAMUSCULAR; INTRAVENOUS; SUBCUTANEOUS at 14:51

## 2024-06-19 RX ADMIN — ONDANSETRON 4 MILLIGRAM(S): 8 TABLET, FILM COATED ORAL at 09:50

## 2024-06-19 RX ADMIN — Medication 400 MILLIGRAM(S): at 09:50

## 2024-06-19 NOTE — ED PROVIDER NOTE - CLINICAL SUMMARY MEDICAL DECISION MAKING FREE TEXT BOX
Patient is a 28-year-old male who presents to the emergency room with fever and flank pain.  Past medical history of thalassemia mild depression history of bilateral ureteral pelvic junction obstruction status post bilateral pyeloplasty history of renal stones.  Patient reports that on Monday he developed fevers chills associated nausea vomiting generalized malaise he also noted some left flank pain.  Today he developed dysuria and was concerned that he may be experiencing a renal stone.  Denies any diarrhea shortness of breath cough.  Flank pain is nonradiating.  Has some vague left upper chest discomfort but he states that may be related to his anxiety.  Denies any testicular pain.  Denies any discharge from the penis.  Patient's urologist is Dr. Dasilva.  He has not taken anything for symptoms today.  Does have a mild frontal headache described more as pressure-like in nature. Patient presenting to the emergency room with a chief complaint of fever left flank pain concern for renal stone.  Differential includes but limited to possible viral etiology versus pyelonephritis versus obstructive renal stone versus additional intra-abdominal pathology.  Will obtain screening septic workup viral swab check UA urine culture obtain CT renal stone hunt.  Will hydrate medicate for symptoms begin antibiotics and monitor.  Ultimate clinical disposition will be pending results Patient is a 28-year-old male who presents to the emergency room with fever and flank pain.  Past medical history of thalassemia mild depression history of bilateral ureteral pelvic junction obstruction status post bilateral pyeloplasty history of renal stones.  Patient reports that on Monday he developed fevers chills associated nausea vomiting generalized malaise he also noted some left flank pain.  Today he developed dysuria and was concerned that he may be experiencing a renal stone.  Denies any diarrhea shortness of breath cough.  Flank pain is nonradiating.  Has some vague left upper chest discomfort but he states that may be related to his anxiety.  Denies any testicular pain.  Denies any discharge from the penis.  Patient's urologist is Dr. Dasilva.  He has not taken anything for symptoms today.  Does have a mild frontal headache described more as pressure-like in nature. Patient presenting to the emergency room with a chief complaint of fever left flank pain concern for renal stone.  Differential includes but limited to possible viral etiology versus pyelonephritis versus obstructive renal stone versus additional intra-abdominal pathology.  Will obtain screening septic workup viral swab check UA urine culture obtain CT renal stone hunt.  Will hydrate medicate for symptoms begin antibiotics and monitor.  Ultimate clinical disposition will be pending results. Independent review of EKG reveals a normal sinus rhythm at 92 bpm

## 2024-06-19 NOTE — ED PROVIDER NOTE - OBJECTIVE STATEMENT
Patient is a 28-year-old male who presents to the emergency room with fever and flank pain.  Past medical history of thalassemia mild depression history of bilateral ureteral pelvic junction obstruction status post bilateral pyeloplasty history of renal stones.  Patient reports that on Monday he developed fevers chills associated nausea vomiting generalized malaise he also noted some left flank pain.  Today he developed dysuria and was concerned that he may be experiencing a renal stone.  Denies any diarrhea shortness of breath cough.  Flank pain is nonradiating.  Has some vague left upper chest discomfort but he states that may be related to his anxiety.  Denies any testicular pain.  Denies any discharge from the penis.  Patient's urologist is Dr. Dasilva.  He has not taken anything for symptoms today.  Does have a mild frontal headache described more as pressure-like in nature.

## 2024-06-19 NOTE — ED PROVIDER NOTE - NSFOLLOWUPINSTRUCTIONS_ED_ALL_ED_FT
A fever is a high body temperature that is 100.4°F (38°C) or higher. Brief mild or moderate fevers often have no lasting effects. They often do not need treatment. Moderate or high fevers can feel uncomfortable. Sometimes, they can be a sign of a serious problem. A fever that keeps coming back or that lasts a long time may cause you to lose water in your body (get dehydrated).    You can use a thermometer to check for a fever. Temperature can change with:  Age.  Time of day.  Where the temperature is taken, such as:  In the mouth.  In the opening of the butt (anus).  In the ear.  Under the arm.  On the forehead.  Follow these instructions at home:  Medicines    Take over-the-counter and prescription medicines only as told by your doctor. Follow instructions on how much medicine to take and how often.  If you were prescribed antibiotics, take them as told by your doctor. Do not stop taking them even if you start to feel better.  General instructions    Watch for any changes in your symptoms. Tell your doctor about them.  Rest as needed.  Drink enough fluid to keep your pee (urine) pale yellow.  Bathe or sponge bathe with room-temperature water as needed. This helps to lower your body temperature. Do not use cold water.  Do not use too many blankets or wear clothes that are too heavy.  You should stay home from work and public places for at least 24 hours after your fever is gone. Your fever should be gone without the use of medicines.  Contact a doctor if:  You vomit or have watery poop (diarrhea) that does not get better.  You cannot eat or drink without vomiting.  It hurts when you pee.  Your symptoms do not get better with treatment or you have new symptoms.  You have a rash on your skin.  You have signs of not having enough water in your body, such as:  Dark pee, very little pee, or no pee.  Cracked lips or dry mouth.  Sunken eyes.  Sleepiness.  Weakness.  Get help right away if:  You are short of breath or have trouble breathing.  You are dizzy or pass out (faint).  You feel mixed up or confused.  You have very bad pain in your belly (abdomen).  These symptoms may be an emergency. Get help right away. Call 911.  Do not wait to see if the symptoms will go away.  Do not drive yourself to the hospital.  This information is not intended to replace advice given to you by your health care provider. Make sure you discuss any questions you have with your health care provider.    Document Revised: 09/19/2023 Document Reviewed: 09/19/2023

## 2024-06-19 NOTE — ED PROVIDER NOTE - PATIENT PORTAL LINK FT
You can access the FollowMyHealth Patient Portal offered by [facility] by registering at the following website: [email or web address]. By joining [employer]’s FollowMyHealth portal, you will also be able to view your health information using other applications (apps) compatible with our system.

## 2024-06-19 NOTE — ED ADULT NURSE NOTE - FINAL NURSING ELECTRONIC SIGNATURE
Hpi Title: Evaluation of Skin Lesions
How Severe Are Your Spot(S)?: mild
Have Your Spot(S) Been Treated In The Past?: has not been treated
19-Jun-2024 22:40

## 2024-06-19 NOTE — ED PROVIDER NOTE - PROGRESS NOTE DETAILS
Patient feeling better, fever has come down, lactate level is improved, awaiting blood cultures and urine culture, patient feels well to go home, understands to return for worsening of symptoms.  Follow-up with primary care doctor.

## 2024-06-19 NOTE — ED ADULT TRIAGE NOTE - ARRIVAL FROM
Probiotic foods are better than probiotic pills. Probiotics help your gut populate a healthy balance of good bacteria to keep harmful bacteria/inflammation and diarrhea in check. Probiotic foods are best eaten an hour or more AFTER you take an antibiotic dose.  We want diverse microflora.   I'd recommend kombucha (but not for kids),  kimchi, jamie (kind of like sour butter milk taste), activia or live culture yogurts, sour dough breads, sourkraut, cheeses (dexter gouda cheddar & swiss), pickles (in moderation- lots of sodium!), Miso soup, apples, green peas (frozen vs canned preferably).    Prebiotics aren't actual bacteria- they're insoluble fiber and non-digestable food ingredients that are good for your gut to support the good bacteria like probiotics and keep your gut healthy. Things like bananas, oats, asparagus, garlic, and soybeans can be good for your overall gut health.     These are the kind of things I'd recommend trying to do. The nutrition that our bodies need is best absorbed from foods we eat vs pills and supplements. If you need extra support, look for probiotics including lactobacillus and bifidobacterium.    
Home

## 2024-06-19 NOTE — ED PROVIDER NOTE - DIFFERENTIAL DIAGNOSIS
Patient presenting to the emergency room with a chief complaint of fever left flank pain concern for renal stone.  Differential includes but limited to possible viral etiology versus pyelonephritis versus obstructive renal stone versus additional intra-abdominal pathology.  Will obtain screening septic workup viral swab check UA urine culture obtain CT renal stone hunt.  Will hydrate medicate for symptoms begin antibiotics and monitor.  Ultimate clinical disposition will be pending results. Differential Diagnosis

## 2024-06-19 NOTE — ED PROVIDER NOTE - NS_EDPROVIDERDISPOUSERTYPE_ED_A_ED
The physical therapist for this patient was calling to follow up with this patient's restrictions. She was wonder if the limitations for the range of motion for her left lower extremity was still in affect or if anything has changed since she was last seen there. She just needs to know what limitations/restrictions this patient has when it comes to PT. Radha Miles is the physical therapist and she can be reached at [phone].      Attending Attestation (For Attendings USE Only)...

## 2024-06-19 NOTE — ED ADULT NURSE NOTE - NSFALLUNIVINTERV_ED_ALL_ED
Bed/Stretcher in lowest position, wheels locked, appropriate side rails in place/Call bell, personal items and telephone in reach/Instruct patient to call for assistance before getting out of bed/chair/stretcher/Non-slip footwear applied when patient is off stretcher/[place] to call system/Physically safe environment - no spills, clutter or unnecessary equipment/Purposeful proactive rounding/Room/bathroom lighting operational, light cord in reach

## 2024-06-19 NOTE — ED PROVIDER NOTE - CARE PROVIDER_API CALL
Nelida Banda.  [facility]  [address] [place] [address]  Phone: [phone]  Fax: [phone]  Follow Up Time:

## 2024-06-20 LAB
EBV EA AB SER IA-ACNC: 8.97 U/ML — HIGH
EBV EA AB TITR SER IF: POSITIVE
EBV EA IGG SER-ACNC: NEGATIVE — SIGNIFICANT CHANGE UP
EBV NA IGG SER IA-ACNC: 453 U/ML — HIGH
EBV PATRN SPEC IB-IMP: SIGNIFICANT CHANGE UP
EBV VCA IGG AVIDITY SER QL IA: POSITIVE
EBV VCA IGM SER IA-ACNC: 250 U/ML — HIGH
EBV VCA IGM SER IA-ACNC: <10 U/ML — SIGNIFICANT CHANGE UP
EBV VCA IGM TITR FLD: NEGATIVE — SIGNIFICANT CHANGE UP

## 2024-08-20 NOTE — ED PROVIDER NOTE - PROGRESS NOTE DETAILS
Med Rec - Admission
CLEMENTINE Massey MD: US shows b/l hydronephrosis. Pt requiring large doses of IV pain medication. Per urology, will admit for further eval/mgt.
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
